# Patient Record
Sex: FEMALE | Race: WHITE | NOT HISPANIC OR LATINO | Employment: FULL TIME | URBAN - METROPOLITAN AREA
[De-identification: names, ages, dates, MRNs, and addresses within clinical notes are randomized per-mention and may not be internally consistent; named-entity substitution may affect disease eponyms.]

---
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
## 2022-01-04 ENCOUNTER — TELEPHONE (OUTPATIENT)
Dept: SPORTS MEDICINE | Facility: CLINIC | Age: 22
End: 2022-01-04
Payer: MEDICAID

## 2022-01-05 ENCOUNTER — OFFICE VISIT (OUTPATIENT)
Dept: SPORTS MEDICINE | Facility: CLINIC | Age: 22
End: 2022-01-05
Payer: MEDICAID

## 2022-01-05 ENCOUNTER — HOSPITAL ENCOUNTER (OUTPATIENT)
Dept: RADIOLOGY | Facility: HOSPITAL | Age: 22
Discharge: HOME | End: 2022-01-05
Attending: ORTHOPAEDIC SURGERY
Payer: MEDICAID

## 2022-01-05 VITALS
HEIGHT: 65 IN | SYSTOLIC BLOOD PRESSURE: 98 MMHG | WEIGHT: 122 LBS | BODY MASS INDEX: 20.33 KG/M2 | DIASTOLIC BLOOD PRESSURE: 64 MMHG | HEART RATE: 88 BPM

## 2022-01-05 DIAGNOSIS — M25.512 CHRONIC PAIN OF BOTH SHOULDERS: Primary | ICD-10-CM

## 2022-01-05 DIAGNOSIS — M54.10 BACK PAIN WITH RADICULOPATHY: ICD-10-CM

## 2022-01-05 DIAGNOSIS — M25.511 CHRONIC PAIN OF BOTH SHOULDERS: Primary | ICD-10-CM

## 2022-01-05 DIAGNOSIS — S46.811A STRAIN OF RIGHT TRAPEZIUS MUSCLE, INITIAL ENCOUNTER: ICD-10-CM

## 2022-01-05 DIAGNOSIS — M25.519 SHOULDER PAIN, UNSPECIFIED CHRONICITY, UNSPECIFIED LATERALITY: ICD-10-CM

## 2022-01-05 DIAGNOSIS — G89.29 CHRONIC PAIN OF BOTH SHOULDERS: Primary | ICD-10-CM

## 2022-01-05 PROCEDURE — 3074F SYST BP LT 130 MM HG: CPT | Mod: CPTII,,, | Performed by: ORTHOPAEDIC SURGERY

## 2022-01-05 PROCEDURE — 3008F PR BODY MASS INDEX (BMI) DOCUMENTED: ICD-10-PCS | Mod: CPTII,,, | Performed by: ORTHOPAEDIC SURGERY

## 2022-01-05 PROCEDURE — 3008F BODY MASS INDEX DOCD: CPT | Mod: CPTII,,, | Performed by: ORTHOPAEDIC SURGERY

## 2022-01-05 PROCEDURE — 73030 X-RAY EXAM OF SHOULDER: CPT | Mod: TC,50,FY,PO

## 2022-01-05 PROCEDURE — 99204 PR OFFICE/OUTPT VISIT, NEW, LEVL IV, 45-59 MIN: ICD-10-PCS | Mod: S$PBB,,, | Performed by: ORTHOPAEDIC SURGERY

## 2022-01-05 PROCEDURE — 99999 PR PBB SHADOW E&M-EST. PATIENT-LVL III: CPT | Mod: PBBFAC,,, | Performed by: ORTHOPAEDIC SURGERY

## 2022-01-05 PROCEDURE — 99204 OFFICE O/P NEW MOD 45 MIN: CPT | Mod: S$PBB,,, | Performed by: ORTHOPAEDIC SURGERY

## 2022-01-05 PROCEDURE — 99999 PR PBB SHADOW E&M-EST. PATIENT-LVL III: ICD-10-PCS | Mod: PBBFAC,,, | Performed by: ORTHOPAEDIC SURGERY

## 2022-01-05 PROCEDURE — 3078F PR MOST RECENT DIASTOLIC BLOOD PRESSURE < 80 MM HG: ICD-10-PCS | Mod: CPTII,,, | Performed by: ORTHOPAEDIC SURGERY

## 2022-01-05 PROCEDURE — 3078F DIAST BP <80 MM HG: CPT | Mod: CPTII,,, | Performed by: ORTHOPAEDIC SURGERY

## 2022-01-05 PROCEDURE — 3074F PR MOST RECENT SYSTOLIC BLOOD PRESSURE < 130 MM HG: ICD-10-PCS | Mod: CPTII,,, | Performed by: ORTHOPAEDIC SURGERY

## 2022-01-05 PROCEDURE — 99213 OFFICE O/P EST LOW 20 MIN: CPT | Mod: PBBFAC,PN | Performed by: ORTHOPAEDIC SURGERY

## 2022-01-05 NOTE — PROGRESS NOTES
Subjective:          Chief Complaint: Marilee Dumas is a 21 y.o. female who had concerns including Pain of the Right Shoulder.    HPI     Patient who is [facility] and from [place] with a history of scoliosis & fibromyalgia and works as a  presents to clinic with bilateral shoulder pain x several years. Patient states the pain began gradually and generalize along her upper back and the lateral border of her scapula. Pain is also present within trapezius musculature. She denies any VANGIE or traumatic event.  She rates the pain as 4/10 at rest and 10/10 at its worst. Things that exacerbate the pain include overhead movement of her right arm, but pain can also become severe during prolonged sitting or standing.  She states she saw a back and spine specialist several years ago in [place], but does not recall what was done.  She has attempted physical therapy for her back for the past year, which has not given her much relief.  She has also received both cervical and lumbar MRI, see detailed findings below.  She has attempted multiple conservative measures that include activity modification, ice & elevation, and oral medications (meloxicam and cyclobenzaprine), which have given some relief. She denies any mechanical symptoms to include locking and catching or instability.  Is affecting ADLs and limiting desired level of activity.  In addition to the pain, she will also have numbness and tingling that radiates down both radial and ulnar aspects of the arm.  She is here today to discuss treatment options.    No previous surgeries or trauma on bilateral shoulder    SSV:  70%      Review of Systems   Constitutional: Negative.   HENT: Negative.    Eyes: Negative.    Cardiovascular: Negative.    Respiratory: Negative.    Endocrine: Negative.    Hematologic/Lymphatic: Negative.    Skin: Negative.    Musculoskeletal: Positive for back pain, joint pain, muscle weakness, myalgias and neck pain. Negative for arthritis,  falls, gout and joint swelling.   Neurological: Positive for numbness and paresthesias.   Psychiatric/Behavioral: Negative.    Allergic/Immunologic: Negative.                    Objective:        General: Marilee is well-developed, well-nourished, appears stated age, in no acute distress, alert and oriented to time, place and person.     General    Nursing note and vitals reviewed.  Constitutional: She is oriented to person, place, and time. She appears well-developed and well-nourished. No distress.   HENT:   Head: Normocephalic and atraumatic.   Nose: Nose normal.   Eyes: EOM are normal.   Cardiovascular: Normal rate and intact distal pulses.    Pulmonary/Chest: Effort normal. No respiratory distress.   Neurological: She is alert and oriented to person, place, and time.   Psychiatric: She has a normal mood and affect. Her behavior is normal. Judgment and thought content normal.         Back (L-Spine & T-Spine) / Neck (C-Spine) Exam     Tenderness   The patient is tender to palpation of the right scapular.   Right Shoulder Exam     Inspection/Observation   Swelling: absent  Bruising: absent  Scars: absent  Deformity: absent  Scapular Winging: absent  Scapular Dyskinesia: negative  Atrophy: absent    Range of Motion   Active abduction: 170   Passive abduction: 170   Extension: 60   Forward Flexion: 160 Adduction: 90   External Rotation 0 degrees: 90 External Rotation 90 degrees: 90   Internal rotation 0 degrees: Upperthoracic   Internal rotation 90 degrees: 90     Tests & Signs   Cross arm: negative  Drop arm: negative  Desai test: negative  Impingement: negative  Rotator Cuff Painful Arc/Range: mild  Lag Sign 0 degrees: negative  Lag Sign 90 degrees: negative  Lift Off Sign: negative  Belly Press: negative  Active Compression Test (Cooper's Sign): negative  Speed's Test: negative  Bear Hug: negative    Other   Sensation: normal    Left Shoulder Exam   Left shoulder exam is normal.    Inspection/Observation    Swelling: absent  Bruising: absent  Scars: absent  Deformity: absent  Scapular Winging: absent  Scapular Dyskinesia: negative  Atrophy: absent    Tenderness   The patient is experiencing no tenderness.     Range of Motion   Active abduction:  170 normal   Passive abduction:  170 normal   Extension:  60 normal   Forward Flexion:  160 normal   Adduction: 90 normal  External Rotation 0 degrees: 90 External Rotation 90 degrees: 90   Internal rotation 0 degrees: Mid thoracic   Internal rotation 90 degrees: 80     Muscle Strength   The patient has normal left shoulder strength.    Tests & Signs   Cross arm: negative  Drop arm: negative  Desai test: negative  Impingement: negative  Lag Sign 0 degrees: negative  Lag Sign 90 degrees: negative  Lift Off Sign: negative  Belly Press: negative  Active Compression test (Harding's Sign): negative  Speed's Test: negative  Bear Hug: negative    Other   Sensation: normal       Muscle Strength   Right Upper Extremity   Shoulder Abduction: 5/5   Shoulder Internal Rotation: 5/5   Shoulder External Rotation: 5/5   Supraspinatus: 5/5   Subscapularis: 5/5   Biceps: 5/5   Left Upper Extremity  Shoulder Abduction: 5/5   Shoulder Internal Rotation: 5/5   Shoulder External Rotation: 5/5   Supraspinatus: 5/5   Subscapularis: 5/5   Biceps: 5/5     Vascular Exam     Right Pulses      Radial:                    2+      Left Pulses      Radial:                    2+      Capillary Refill  Right Hand: normal capillary refill  Left Hand: normal capillary refill    MRI cervical spine    Difficult reading due to patient motion    No signs of spinal canal or neural foramen stenosis noted          Assessment:       Encounter Diagnoses   Name Primary?    Chronic pain of both shoulders Yes    Back pain with radiculopathy     Strain of right trapezius muscle, initial encounter           Plan:       1. I made the decision to order new imaging of the extremity or extremities evaluated. I independently  reviewed and interpreted the radiographs and/or MRIs today. These images were shown to the patient where I then discussed my findings in detail.    2. We discussed at length different treatment options including conservative vs surgical management. These include anti-inflammatories, acetaminophen, rest, ice, heat, formal physical therapy including strengthening and stretching exercises, home exercise programs, dry needling, and finally surgical intervention.  We discussed that the source of her pain may be due to some nerve impingement at some point between the cervical spine and her right shoulder, due to the fact her shoulder strength seems appropriate and shoulder exam for the most part is unremarkable.  At this point, we will begin with  formal physical therapy for 6 weeks and then see patient again for follow-up.  Patient expressed understanding and is in agreement with this plan.    3. Ambulatory referral for formal physical therapy at [facility] with focus on Rotator cuff and Periscapular strengthening    4. Continue meloxicam and cyclobenzaprine as needed for pain.    5. RTC to see Tc Whiting PA-C in 6 weeks for follow-up.  Will reassess pain level and determine if possible referral to see my colleague Louis Billingsley D.O. for OMT.  If pain has not subsided with this management will consider referral to back and spine.      All of the patient's questions were answered. Patient was advised to call the clinic or contact me through the patient portal for any questions or concerns.                     Patient questionnaires may have been collected.

## 2022-01-06 ENCOUNTER — TELEPHONE (OUTPATIENT)
Dept: SPORTS MEDICINE | Facility: CLINIC | Age: 22
End: 2022-01-06
Payer: MEDICAID

## 2022-01-06 NOTE — TELEPHONE ENCOUNTER
----- Message from Sharri Lees sent at 1/6/2022  2:09 PM CST -----  Regarding: Patient Advice  Contact: Pt [phone]  Patient is calling Dr. Whiting's office in regards to getting referral get Physical Therapy to [facility] in [facility]. Please call. [phone]

## 2022-01-07 ENCOUNTER — TELEPHONE (OUTPATIENT)
Dept: SPORTS MEDICINE | Facility: CLINIC | Age: 22
End: 2022-01-07
Payer: MEDICAID

## 2022-01-07 NOTE — TELEPHONE ENCOUNTER
----- Message from Gregory Morales MA sent at 1/7/2022  8:44 AM CST -----    ----- Message -----  From: Melissa Tsai  Sent: 1/7/2022   8:18 AM CST  To: Johanne ROMERO Staff    .Type:  Patient Returning Call    Who Called: Kaylie   Would the patient rather a call back or a response via [facility]? Call back   Best Call Back Number: [phone]   Additional Information:  Good morning I have [facility] calling in ref to fax received for mrn [phone] Marilee Dumas... they are looking for pt demographic sheet and insurance verification sheet  Fax to [phone]

## 2022-02-10 ENCOUNTER — PATIENT MESSAGE (OUTPATIENT)
Dept: SPORTS MEDICINE | Facility: CLINIC | Age: 22
End: 2022-02-10
Payer: MEDICAID

## 2022-02-23 ENCOUNTER — OFFICE VISIT (OUTPATIENT)
Dept: SPORTS MEDICINE | Facility: CLINIC | Age: 22
End: 2022-02-23
Payer: MEDICAID

## 2022-02-23 DIAGNOSIS — M54.10 BACK PAIN WITH RADICULOPATHY: Primary | ICD-10-CM

## 2022-02-23 PROCEDURE — 99213 OFFICE O/P EST LOW 20 MIN: CPT | Mod: 95,S$PBB,, | Performed by: ORTHOPAEDIC SURGERY

## 2022-02-23 PROCEDURE — 1159F MED LIST DOCD IN RCRD: CPT | Mod: CPTII,95,, | Performed by: ORTHOPAEDIC SURGERY

## 2022-02-23 PROCEDURE — 99213 PR OFFICE/OUTPT VISIT, EST, LEVL III, 20-29 MIN: ICD-10-PCS | Mod: 95,S$PBB,, | Performed by: ORTHOPAEDIC SURGERY

## 2022-02-23 PROCEDURE — 1160F PR REVIEW ALL MEDS BY PRESCRIBER/CLIN PHARMACIST DOCUMENTED: ICD-10-PCS | Mod: CPTII,95,, | Performed by: ORTHOPAEDIC SURGERY

## 2022-02-23 PROCEDURE — 1159F PR MEDICATION LIST DOCUMENTED IN MEDICAL RECORD: ICD-10-PCS | Mod: CPTII,95,, | Performed by: ORTHOPAEDIC SURGERY

## 2022-02-23 PROCEDURE — 1160F RVW MEDS BY RX/DR IN RCRD: CPT | Mod: CPTII,95,, | Performed by: ORTHOPAEDIC SURGERY

## 2022-02-23 NOTE — PROGRESS NOTES
The patient location is: Home   The chief complaint leading to consultation is: f/u Right shoulder pain    Visit type: audio only    Notes:    Subjective:    HPI:    Spoke with the patient today for a 12 week f/u visit for Right shoulder and back pain. Patient reports her pain is unchanged since our initial visit.  She attended physical therapy at [facility] in [place] and states she attended twice a week for the past 2 months.  She was previously attending this facility for PT for her back. She feels most of the pain is still between her spine and her shoulder blade.  She still gets occasional numbness and tingling radiating down her arm but is very infrequent.    She has not seen a back and spine specialist for this issue since she was 17. She does not recall exactly what was done then other than the fact she was diagnosed with scoliosis & fibromyalgia and saw a chiropractor. She has been taking Mobic and cyclobenzaprine that has not given her relief.    Pain has been X dream we did a bili taping and affecting her ADLs, as she works as a  which requires a great deal standing and walking.  Pain is at its worst following evenings after she works and stands for long period of time. She feels the pain does not change based on the use of the shoulder.    Interval history 1/5/22:  Patient who is [facility] and from [place] with a history of scoliosis & fibromyalgia and works as a  presents to clinic with bilateral shoulder pain x several years. Patient states the pain began gradually and generalize along her upper back and the lateral border of her scapula. Pain is also present within trapezius musculature. She denies any VANGIE or traumatic event.  She rates the pain as 4/10 at rest and 10/10 at its worst. Things that exacerbate the pain include overhead movement of her right arm, but pain can also become severe during prolonged sitting or standing.  She states she saw a back and spine specialist several  years ago in [place], but does not recall what was done.  She has attempted physical therapy for her back for the past year, which has not given her much relief.  She has also received both cervical and lumbar MRI, see detailed findings below.  She has attempted multiple conservative measures that include activity modification, ice & elevation, and oral medications (meloxicam and cyclobenzaprine), which have given some relief. She denies any mechanical symptoms to include locking and catching or instability.  Is affecting ADLs and limiting desired level of activity.  In addition to the pain, she will also have numbness and tingling that radiates down both radial and ulnar aspects of the arm.  She is here today to discuss treatment options.     No previous surgeries or trauma on bilateral shoulder     SSV:  70%      Review of Systems:   Negative unless otherwise noted positive-  Gen- Weakness/ Fatigue  Neuro- Confusion  CV- Chest Pain/ Palpitations  Resp: Cough/ SOB  GI- Nausea/Vomiting  Extrem- Pain/Swelling      Objective:  No flowsheet data found.      Physical Exam:  General- Patient alert and oriented x3 in NAD  HEENT- PERRLA, EOMI, OP clear  Resp- No increased WOB noted. Not using accessory muscles.  GI- Non tender/non-distended  Extrem- No cyanosis, clubbing, edema.   Skin-  No Jaundice. No visible skin lesions.      Plan:    Due to patient's pain level not responding to 12 weeks of physical therapy that focused on the shoulder, as well as she feels most of the pain is related to her back and not affected by her shoulder.  I feel that a referral to back and spine is warranted at this time.    Referral placed to back and spine in the [place] region.     RTC to see Tc beltran.  Patient was advised to contact our clinic if no one reaches out to her to schedule appointment with back and spine by next week.      All of the patient's questions were answered. Patient was advised to call the  clinic or contact me through the patient portal for any questions or concerns.         Face to Face time with patient:  0  Fifteen minutes of total time spent on the encounter, which includes face to face time and non-face to face time preparing to see the patient (eg, review of tests), Obtaining and/or reviewing separately obtained history, Documenting clinical information in the electronic or other health record, Independently interpreting results (not separately reported) and communicating results to the patient/family/caregiver, or Care coordination (not separately reported).     Each patient to whom he or she provides medical services by telemedicine is:  (1) informed of the relationship between the physician and patient and the respective role of any other health care provider with respect to management of the patient; and (2) notified that he or she may decline to receive medical services by telemedicine and may withdraw from such care at any time.

## 2022-02-23 NOTE — PROGRESS NOTES
Subjective:          Chief Complaint: Marilee Dumas is a 21 y.o. female who had no chief complaint listed for this encounter.    HPI     Patient who is [facility] and from [place] with a history of scoliosis & fibromyalgia and works as a  presents to clinic with bilateral shoulder pain x several years. Patient states the pain began gradually and generalize along her upper back and the lateral border of her scapula. Pain is also present within trapezius musculature. She denies any VANGIE or traumatic event.  She rates the pain as 4/10 at rest and 10/10 at its worst. Things that exacerbate the pain include overhead movement of her right arm, but pain can also become severe during prolonged sitting or standing.  She states she saw a back and spine specialist several years ago in [place], but does not recall what was done.  She has attempted physical therapy for her back for the past year, which has not given her much relief.  She has also received both cervical and lumbar MRI, see detailed findings below.  She has attempted multiple conservative measures that include activity modification, ice & elevation, and oral medications (meloxicam and cyclobenzaprine), which have given some relief. She denies any mechanical symptoms to include locking and catching or instability.  Is affecting ADLs and limiting desired level of activity.  In addition to the pain, she will also have numbness and tingling that radiates down both radial and ulnar aspects of the arm.  She is here today to discuss treatment options.    No previous surgeries or trauma on bilateral shoulder    SSV:  70%      Review of Systems   Constitutional: Negative.   HENT: Negative.    Eyes: Negative.    Cardiovascular: Negative.    Respiratory: Negative.    Endocrine: Negative.    Hematologic/Lymphatic: Negative.    Skin: Negative.    Musculoskeletal: Positive for back pain, joint pain, muscle weakness, myalgias and neck pain. Negative for arthritis,  falls, gout and joint swelling.   Neurological: Positive for numbness and paresthesias.   Psychiatric/Behavioral: Negative.    Allergic/Immunologic: Negative.                    Objective:        General: Marilee is well-developed, well-nourished, appears stated age, in no acute distress, alert and oriented to time, place and person.     General    Nursing note and vitals reviewed.  Constitutional: She is oriented to person, place, and time. She appears well-developed and well-nourished. No distress.   HENT:   Head: Normocephalic and atraumatic.   Nose: Nose normal.   Eyes: EOM are normal.   Cardiovascular: Normal rate and intact distal pulses.    Pulmonary/Chest: Effort normal. No respiratory distress.   Neurological: She is alert and oriented to person, place, and time.   Psychiatric: She has a normal mood and affect. Her behavior is normal. Judgment and thought content normal.         Back (L-Spine & T-Spine) / Neck (C-Spine) Exam     Tenderness   The patient is tender to palpation of the right scapular.   Right Shoulder Exam     Inspection/Observation   Swelling: absent  Bruising: absent  Scars: absent  Deformity: absent  Scapular Winging: absent  Scapular Dyskinesia: negative  Atrophy: absent    Range of Motion   Active abduction: 170   Passive abduction: 170   Extension: 60   Forward Flexion: 160 Adduction: 90   External Rotation 0 degrees: 90 External Rotation 90 degrees: 90   Internal rotation 0 degrees: Upperthoracic   Internal rotation 90 degrees: 90     Tests & Signs   Cross arm: negative  Drop arm: negative  Desai test: negative  Impingement: negative  Rotator Cuff Painful Arc/Range: mild  Lag Sign 0 degrees: negative  Lag Sign 90 degrees: negative  Lift Off Sign: negative  Belly Press: negative  Active Compression Test ([place]'s Sign): negative  Speed's Test: negative  Bear Hug: negative    Other   Sensation: normal    Left Shoulder Exam   Left shoulder exam is normal.    Inspection/Observation    Swelling: absent  Bruising: absent  Scars: absent  Deformity: absent  Scapular Winging: absent  Scapular Dyskinesia: negative  Atrophy: absent    Tenderness   The patient is experiencing no tenderness.     Range of Motion   Active abduction:  170 normal   Passive abduction:  170 normal   Extension:  60 normal   Forward Flexion:  160 normal   Adduction: 90 normal  External Rotation 0 degrees: 90 External Rotation 90 degrees: 90   Internal rotation 0 degrees: Mid thoracic   Internal rotation 90 degrees: 80     Muscle Strength   The patient has normal left shoulder strength.    Tests & Signs   Cross arm: negative  Drop arm: negative  Desai test: negative  Impingement: negative  Lag Sign 0 degrees: negative  Lag Sign 90 degrees: negative  Lift Off Sign: negative  Belly Press: negative  Active Compression test ([place]'s Sign): negative  Speed's Test: negative  Bear Hug: negative    Other   Sensation: normal       Muscle Strength   Right Upper Extremity   Shoulder Abduction: 5/5   Shoulder Internal Rotation: 5/5   Shoulder External Rotation: 5/5   Supraspinatus: 5/5   Subscapularis: 5/5   Biceps: 5/5   Left Upper Extremity  Shoulder Abduction: 5/5   Shoulder Internal Rotation: 5/5   Shoulder External Rotation: 5/5   Supraspinatus: 5/5   Subscapularis: 5/5   Biceps: 5/5     Vascular Exam     Right Pulses      Radial:                    2+      Left Pulses      Radial:                    2+      Capillary Refill  Right Hand: normal capillary refill  Left Hand: normal capillary refill    MRI cervical spine    Difficult reading due to patient motion    No signs of spinal canal or neural foramen stenosis noted          Assessment:       No diagnosis found.       Plan:       1. I made the decision to order new imaging of the extremity or extremities evaluated. I independently reviewed and interpreted the radiographs and/or MRIs today. These images were shown to the patient where I then discussed my findings in  detail.    2. We discussed at length different treatment options including conservative vs surgical management. These include anti-inflammatories, acetaminophen, rest, ice, heat, formal physical therapy including strengthening and stretching exercises, home exercise programs, dry needling, and finally surgical intervention.  We discussed that the source of her pain may be due to some nerve impingement at some point between the cervical spine and her right shoulder, due to the fact her shoulder strength seems appropriate and shoulder exam for the most part is unremarkable.  At this point, we will begin with  formal physical therapy for 6 weeks and then see patient again for follow-up.  Patient expressed understanding and is in agreement with this plan.    3. Ambulatory referral for formal physical therapy at [facility] with focus on Rotator cuff and Periscapular strengthening    4. Continue meloxicam and cyclobenzaprine as needed for pain.    5. RTC to see Tc Whiting PA-C in 6 weeks for follow-up.  Will reassess pain level and determine if possible referral to see my colleague Louis Billingsley D.O. for OMT.  If pain has not subsided with this management will consider referral to back and spine.      All of the patient's questions were answered. Patient was advised to call the clinic or contact me through the patient portal for any questions or concerns.                     Patient questionnaires may have been collected.

## 2022-02-28 ENCOUNTER — TELEPHONE (OUTPATIENT)
Dept: SPINE | Facility: CLINIC | Age: 22
End: 2022-02-28
Payer: MEDICAID

## 2022-02-28 NOTE — TELEPHONE ENCOUNTER
From B&S workqueue.  Spoke with patient and she would like to make an appt in [place].  Will forward to [facility] nurse navigator

## 2022-03-01 ENCOUNTER — PATIENT MESSAGE (OUTPATIENT)
Dept: PHYSICAL MEDICINE AND REHAB | Facility: CLINIC | Age: 22
End: 2022-03-01
Payer: MEDICAID

## 2022-03-01 ENCOUNTER — TELEPHONE (OUTPATIENT)
Dept: PAIN MEDICINE | Facility: CLINIC | Age: 22
End: 2022-03-01
Payer: MEDICAID

## 2022-03-01 NOTE — TELEPHONE ENCOUNTER
1. LVM for patient to call back [phone] regarding appointment for Back and Spine referral.    2. Left message in patient portal for call back.    Kaila Tomas RN

## 2022-03-02 ENCOUNTER — TELEPHONE (OUTPATIENT)
Dept: PAIN MEDICINE | Facility: CLINIC | Age: 22
End: 2022-03-02
Payer: MEDICAID

## 2022-03-02 NOTE — TELEPHONE ENCOUNTER
"Returned patient's phone call and calling again from . Message states "not accepting calls at this time" and no VM is set up. Tried alternate number and it is not a working number.    Kaila Tomas RN  "

## 2022-03-11 ENCOUNTER — TELEPHONE (OUTPATIENT)
Dept: PHYSICAL MEDICINE AND REHAB | Facility: CLINIC | Age: 22
End: 2022-03-11
Payer: MEDICAID

## 2022-03-11 NOTE — TELEPHONE ENCOUNTER
----- Message from Kirit Shelton sent at 3/11/2022 12:04 PM CST -----  Pt has referral would like to schedule appt with back/spine clinic.  Please call back at .[phone].  Patricio Dyer

## 2022-03-11 NOTE — TELEPHONE ENCOUNTER
Spoke with Ms. Dumas and scheduled an appt with Back and Spine for 4.22.22 at 1pm with Dr. Solis.

## 2022-04-05 ENCOUNTER — HOSPITAL ENCOUNTER (EMERGENCY)
Facility: HOSPITAL | Age: 22
Discharge: HOME | End: 2022-04-06
Attending: EMERGENCY MEDICINE
Payer: MEDICAID

## 2022-04-05 DIAGNOSIS — K59.00 CONSTIPATION: ICD-10-CM

## 2022-04-05 DIAGNOSIS — K56.41 FECAL IMPACTION IN RECTUM: Primary | ICD-10-CM

## 2022-04-05 PROCEDURE — 99283 EMERGENCY DEPT VISIT LOW MDM: CPT | Mod: ER

## 2022-04-05 PROCEDURE — 25000003 PHARM REV CODE 250: Mod: ER | Performed by: EMERGENCY MEDICINE

## 2022-04-05 RX ORDER — LACTULOSE 10 G/15ML
20 SOLUTION ORAL
Status: COMPLETED | OUTPATIENT
Start: 2022-04-05 | End: 2022-04-05

## 2022-04-05 RX ORDER — GLYCERIN 1 G/1
1 SUPPOSITORY RECTAL
Status: COMPLETED | OUTPATIENT
Start: 2022-04-05 | End: 2022-04-05

## 2022-04-05 RX ADMIN — LACTULOSE 20 G: 20 SOLUTION ORAL at 11:04

## 2022-04-05 RX ADMIN — GLYCERIN 1 SUPPOSITORY: 2 SUPPOSITORY RECTAL at 10:04

## 2022-04-06 VITALS
SYSTOLIC BLOOD PRESSURE: 123 MMHG | BODY MASS INDEX: 17.24 KG/M2 | TEMPERATURE: 99 F | HEART RATE: 85 BPM | DIASTOLIC BLOOD PRESSURE: 78 MMHG | RESPIRATION RATE: 18 BRPM | OXYGEN SATURATION: 100 % | WEIGHT: 103.63 LBS

## 2022-04-06 RX ORDER — LACTULOSE 10 G/15ML
10 SOLUTION ORAL; RECTAL 3 TIMES DAILY PRN
Qty: 135 ML | Refills: 0 | Status: SHIPPED | OUTPATIENT
Start: 2022-04-06 | End: 2022-06-01

## 2022-04-06 NOTE — ED PROVIDER NOTES
Encounter Date: 4/5/2022       History     Chief Complaint   Patient presents with    Constipation     Has not had BM in 4 days. Currently taking oxycodone. Took miralax, mag sulfate, stool softener and still cannot go.      20 y/o F with PMH low back pain here with c/o constipation. Has not had a BM in 4 days. This has not improved with miralax, mag sulfate at home over the past day. This worsens when she takes oxycodone. She denies any rectal pain, abnormal vaginal discharge, vaginal bleeding, dysuria, N/V.         Review of patient's allergies indicates:   Allergen Reactions    Penicillins      Past Medical History:   Diagnosis Date    Neck pain      No past surgical history on file.  No family history on file.  Social History     Tobacco Use    Smoking status: Current Every Day Smoker     Types: Cigarettes    Smokeless tobacco: Former User   Substance Use Topics    Alcohol use: Never    Drug use: Yes     Types: Marijuana     Review of Systems   Constitutional: Negative for diaphoresis and fever.   HENT: Negative for congestion, dental problem and sore throat.    Eyes: Negative for pain and visual disturbance.   Respiratory: Negative for cough and shortness of breath.    Cardiovascular: Negative for chest pain and palpitations.   Gastrointestinal: Positive for constipation. Negative for abdominal pain, diarrhea, nausea and vomiting.   Genitourinary: Negative for dysuria and flank pain.   Musculoskeletal: Negative for back pain and neck pain.   Skin: Negative for rash and wound.   Neurological: Negative for weakness, numbness and headaches.   Psychiatric/Behavioral: Negative for agitation and confusion.       Physical Exam     Initial Vitals [04/05/22 2214]   BP Pulse Resp Temp SpO2   122/79 (!) 126 18 99.2 °F (37.3 °C) 100 %      MAP       --         Physical Exam    Constitutional: She appears well-developed and well-nourished.   HENT:   Head: Normocephalic and atraumatic.   Eyes: EOM are normal. Pupils  are equal, round, and reactive to light.   Neck: Neck supple.   Normal range of motion.  Cardiovascular: Normal rate and regular rhythm.   Pulmonary/Chest: Breath sounds normal. No respiratory distress.   Abdominal: She exhibits no distension. There is no abdominal tenderness.   Genitourinary:    Genitourinary Comments: Fecal impaction. No hemorrhoids or masses. No rectal bleeding, melena, or hematochezia     Musculoskeletal:         General: No tenderness or edema. Normal range of motion.      Cervical back: Normal range of motion and neck supple.     Neurological: She is alert and oriented to person, place, and time.   Skin: Skin is warm and dry.   Psychiatric: She has a normal mood and affect.         ED Course   Procedures  Labs Reviewed - No data to display       Imaging Results          X-Ray Abdomen Flat And Erect (Final result)  Result time 04/05/22 22:46:55    Final result by Sam Polo MD (04/05/22 22:46:55)                 Impression:      Constipation versus impaction.      Electronically signed by: Sam Polo  Date:    04/05/2022  Time:    22:46             Narrative:    EXAMINATION:  XR ABDOMEN FLAT AND ERECT    CLINICAL HISTORY:  Constipation, unspecified    TECHNIQUE:  Flat and erect AP views of the abdomen were performed.    COMPARISON:  None    FINDINGS:  Moderate stool burden over the colon and rectum.  Correlation for constipation or impaction    No findings to suggest bowel obstruction.  No gross intraperitoneal free air.  Lung bases are clear.  Osseous structures intact.                                 Medications   glycerin adult suppository 1 suppository (1 suppository Rectal Given 4/5/22 2240)   lactulose 20 gram/30 mL solution Soln 20 g (20 g Oral Given 4/5/22 2347)                 ED Course as of 04/06/22 0026   Tue Apr 05, 2022   2358 Patient disimpacted [BA]   Wed Apr 06, 2022   0021 Patient haD BM in the ED. Would like to go home and continue BM's.  [BA]   0022 12:22 AM  Reassessment: I reassessed the pt.  The pt is resting comfortably and is NAD.  Pt states their sx have improved. Discussed test results, shared treatment plan, specific conditions for return, and the need for f/u.  Answered their questions at this time.  Pt understands and agrees to the plan.  The pt has remained hemodynamically stable through ED course and is stable for discharge.    [BA]      ED Course User Index  [BA] Roland Pappas MD             Clinical Impression:   Final diagnoses:  [K59.00] Constipation  [K56.41] Fecal impaction in rectum (Primary)          ED Disposition Condition    Discharge Stable        ED Prescriptions     Medication Sig Dispense Start Date End Date Auth. Provider    lactulose (CHRONULAC) 10 gram/15 mL solution Take 15 mLs (10 g total) by mouth 3 (three) times daily as needed (constipation). 135 mL 4/6/2022  Roland Pappas MD        Follow-up Information     Follow up With Specialties Details Why Contact Info    Manuel Kimble MD Family Medicine Schedule an appointment as soon as possible for a visit in 2 days For re-evaluation and further treatment [id number] [facility]  [place] [address]  [phone]      [facility] Emergency Dept Emergency Medicine Go today If symptoms worsen, For re-evaluation and further treatment, As needed [id number]  [place] [id number] [phone]           Roland Pappas MD  04/06/22 0026

## 2022-04-22 ENCOUNTER — OFFICE VISIT (OUTPATIENT)
Dept: PAIN MEDICINE | Facility: CLINIC | Age: 22
End: 2022-04-22
Payer: MEDICAID

## 2022-04-22 DIAGNOSIS — M54.10 BACK PAIN WITH RADICULOPATHY: ICD-10-CM

## 2022-04-22 DIAGNOSIS — M47.894 THORACIC FACET JOINT SYNDROME: ICD-10-CM

## 2022-04-22 DIAGNOSIS — M47.814 THORACIC SPONDYLOSIS: Primary | ICD-10-CM

## 2022-04-22 PROCEDURE — 99204 PR OFFICE/OUTPT VISIT, NEW, LEVL IV, 45-59 MIN: ICD-10-PCS | Mod: 95,,, | Performed by: ANESTHESIOLOGY

## 2022-04-22 PROCEDURE — 1160F PR REVIEW ALL MEDS BY PRESCRIBER/CLIN PHARMACIST DOCUMENTED: ICD-10-PCS | Mod: CPTII,95,, | Performed by: ANESTHESIOLOGY

## 2022-04-22 PROCEDURE — 1160F RVW MEDS BY RX/DR IN RCRD: CPT | Mod: CPTII,95,, | Performed by: ANESTHESIOLOGY

## 2022-04-22 PROCEDURE — 1159F PR MEDICATION LIST DOCUMENTED IN MEDICAL RECORD: ICD-10-PCS | Mod: CPTII,95,, | Performed by: ANESTHESIOLOGY

## 2022-04-22 PROCEDURE — 99204 OFFICE O/P NEW MOD 45 MIN: CPT | Mod: 95,,, | Performed by: ANESTHESIOLOGY

## 2022-04-22 PROCEDURE — 1159F MED LIST DOCD IN RCRD: CPT | Mod: CPTII,95,, | Performed by: ANESTHESIOLOGY

## 2022-04-22 NOTE — PROGRESS NOTES
The patient location is: Home  The chief complaint leading to consultation is: Thoracic Back Pain     Visit type: audiovisual    Face to Face time with patient: 15  20 minutes of total time spent on the encounter, which includes face to face time and non-face to face time preparing to see the patient (eg, review of tests), Obtaining and/or reviewing separately obtained history, Documenting clinical information in the electronic or other health record, Independently interpreting results (not separately reported) and communicating results to the patient/family/caregiver, or Care coordination (not separately reported).         Each patient to whom he or she provides medical services by telemedicine is:  (1) informed of the relationship between the physician and patient and the respective role of any other health care provider with respect to management of the patient; and (2) notified that he or she may decline to receive medical services by telemedicine and may withdraw from such care at any time.    Notes:     Chief Pain Complaint:  Thoracic Back Pain       History of Present Illness:   Marilee Dumas is a 21 y.o. female  who is presenting with a chief complaint of Thoracic Back Pain. The patient began experiencing this problem insidiously, and the pain has been gradually worsening over the past 5 year(s). The pain is described as throbbing, cramping, aching and heavy and is located in the bilateral mid back T4-5. Pain is intermittent and lasts hours. The  pain is nonradiating. The patient rates her pain a 7 out of ten and interferes with activities of daily living a 7 out of ten. Pain is exacerbated by rotation of the thoracic spine, and is improved by rest. Patient reports prior trauma patient fell from a swing 5 years ago, no prior spinal surgery     - pertinent negatives: No fever, No chills, No weight loss, No bladder dysfunction, No bowel dysfunction, No saddle anesthesia  - pertinent positives: none    -  medications, other therapies tried (physical therapy, injections):     >> NSAIDs, Tylenol, Tramadol, gabapentin, flexeril and medrol dose pack    >> Has previously undergone Physical Therapy    >> Has NOT previously undergone spinal injection/s      Imaging / Labs / Studies (reviewed on 4/22/2022):    Results for orders placed during the hospital encounter of 09/10/21    MRI Lumbar Spine Without Contrast    Narrative  EXAMINATION:  MRI LUMBAR SPINE WITHOUT CONTRAST    CLINICAL HISTORY:  Back pain or radiculopathy, > 6 wks; Dorsalgia, unspecified    TECHNIQUE:  Multiplanar, multisequence MR images were acquired from the thoracolumbar junction to the sacrum without the administration of contrast.    COMPARISON:  None.    FINDINGS:  Alignment: There is slight rightward rotation of multiple upper lumbar vertebral bodies, likely related to mild thoracic scoliosis.    Vertebrae: Normal marrow signal. No fracture.    Discs: Normal height and signal.    Cord: Normal.  Conus terminates at L1    Degenerative findings:    No significant disc bulge, spinal canal stenosis, or neural foraminal narrowing.    Paraspinal muscles & soft tissues: Unremarkable.    Impression  As above.  No acute findings.      Electronically signed by: Jerzy Stevenson MD  Date:    09/10/2021  Time:    12:22        Results for orders placed during the hospital encounter of 09/10/21    MRI Cervical Spine Without Contrast    Narrative  EXAMINATION:  MRI CERVICAL SPINE WITHOUT CONTRAST    CLINICAL HISTORY:  Cervical radiculopathy;.  Radiculopathy, cervical region    TECHNIQUE:  Multiplanar, multisequence MR images of the cervical spine were acquired without the administration of contrast.    COMPARISON:  None.    FINDINGS:  Image quality is degraded by motion, lowering exam sensitivity and specificity.  Interpretation is offered within these confines.    Vertebral body heights and alignment are within normal limits. Intervertebral disc spaces are well  preserved.  Marrow signal throughout the visualized osseous structures is within normal limits with no evidence of fracture or marrow replacement process.    Cervical cord is normal in signal and caliber.    Limited evaluation of the cervical soft tissues demonstrates no gross abnormality.    C2-3:   No spinal canal or neural foraminal stenosis.    C3-4:  No spinal canal or neural foraminal stenosis.    C4-5:  No spinal canal or neural foraminal stenosis.    C5-6:  No spinal canal or neural foraminal stenosis.    C6-7:  No spinal canal or neural foraminal stenosis.    C7-T1:  No spinal canal or neural foraminal stenosis.    Impression  Unremarkable MRI of the cervical spine.      Electronically signed by: Jerzy Stevenson MD  Date:    09/10/2021  Time:    12:09      Review of Systems:  CONSTITUTIONAL: patient denies any fever, chills, or weight loss  SKIN: patient denies any rash or itching  RESPIRATORY: patient denies having any shortness of breath  GASTROINTESTINAL: patient denies having any diarrhea, constipation, or bowel incontinence  GENITOURINARY: patient denies having any abnormal bladder function    MUSCULOSKELETAL:  - patient complains of the above noted pain/s (see chief pain complaint)    NEUROLOGICAL:   - pain as above  - strength in Upper extremities is intact, BILATERALLY  - sensation in Upper extremities is intact, BILATERALLY  - patient denies any loss of bowel or bladder control      PSYCHIATRIC: patient denies any change in mood    Other:  All other systems reviewed and are negative      Physical Exam:  LMP 03/28/2022 (Approximate)  (reviewed on 4/22/2022)  General: Alert and oriented, in no apparent distress.  Gait: normal gait.  Skin: No rashes, No discoloration, No obvious lesions  HEENT: Normocephalic, atraumatic. Pupils equal and round.  Cardiovascular: Regular rate and rhythm , no significant peripheral edema present  Respiratory: Without audible wheezing, without use of accessory muscles of  respiration.    Musculoskeletal:    Cervical Spine    - Pain on flexion of cervical spine Absent  - Spurling's Test:  Absent    - Pain on extension of cervical spine Absent  - TTP over the cervical facet joints Present  - Cervical facet loading Absent    Thoracic Spine    - Pain on flexion of Thoracic spine Present  - Pain on extension of Thoracic spine Equivocal  - TTP over the Thoracic facet joints Present T4-5   - Thoracic facet loading Equivocal      Lumbar Spine    - Pain on flexion of lumbar spine Absent  - Straight Leg Raise:  Absent    - Pain on extension of lumbar spine Absent  - TTP over the lumbar facet joints Absent  - Lumbar facet loading Absent    -Pain on palpation over the SI joint  Absent  - ELVIS: Absent      Neuro:    Strength:  UE R/L: D: 5/5; B: 5/5; T: 5/5; WF: 5/5; WE: 5/5; IO: 5/5;  LE R/L: HF: 5/5, HE: 5/5, KF: 5/5; KE: 5/5; FE: 5/5; FF: 5/5    Extremity Reflexes: Brisk and symmetric throughout.      Extremity Sensory: Sensation to pinprick and temperature symmetric. Proprioception intact.      Psych:  Mood and affect is appropriate      Assessment:    Marilee Dumas is a 21 y.o. year old female who is presenting with     Encounter Diagnosis   Name Primary?    Back pain with radiculopathy        Plan:    1. Interventional: Consider T3,4 5, MBB.    2. Pharmacologic: Flexeril 10 mg Po TID PRN. Mobic 7.5 mg PO Q day PRN.     3. Rehabilitative: Patient has already done PT and chiropractic care.     4. Diagnostic: Cervical and Lumbar MRI reviewed. Thoracic xray.     5.  Follow up: In clinic.      20 minutes were spent in this encounter with more than 50% of the time used for counseling and review of the plan.  Imaging / studies reviewed, detailed above.  I discussed in detail the risks, benefits, and alternatives to any and all potential treatment options.  All questions and concerns were fully addressed today in clinic. Medical decision making moderate.    Thank you for the opportunity to  assist in the care of this patient.    Best wishes,    Signed:    Edmar Solis MD          Disclaimer:  This note may have been prepared using voice recognition software, it may have not been extensively proofed, as such there could be errors within the text such as sound alike errors.

## 2022-05-06 ENCOUNTER — OFFICE VISIT (OUTPATIENT)
Dept: PAIN MEDICINE | Facility: CLINIC | Age: 22
End: 2022-05-06
Payer: MEDICAID

## 2022-05-06 ENCOUNTER — HOSPITAL ENCOUNTER (OUTPATIENT)
Dept: RADIOLOGY | Facility: HOSPITAL | Age: 22
Discharge: HOME | End: 2022-05-06
Attending: PHYSICIAN ASSISTANT
Payer: MEDICAID

## 2022-05-06 VITALS
HEART RATE: 105 BPM | HEIGHT: 65 IN | WEIGHT: 101.63 LBS | RESPIRATION RATE: 17 BRPM | DIASTOLIC BLOOD PRESSURE: 78 MMHG | SYSTOLIC BLOOD PRESSURE: 129 MMHG | BODY MASS INDEX: 16.93 KG/M2

## 2022-05-06 DIAGNOSIS — M54.6 CHRONIC BILATERAL THORACIC BACK PAIN: ICD-10-CM

## 2022-05-06 DIAGNOSIS — G89.29 CHRONIC BILATERAL THORACIC BACK PAIN: ICD-10-CM

## 2022-05-06 DIAGNOSIS — M54.6 CHRONIC BILATERAL THORACIC BACK PAIN: Primary | ICD-10-CM

## 2022-05-06 DIAGNOSIS — G89.29 CHRONIC BILATERAL THORACIC BACK PAIN: Primary | ICD-10-CM

## 2022-05-06 PROCEDURE — 3078F DIAST BP <80 MM HG: CPT | Mod: CPTII,,, | Performed by: PHYSICIAN ASSISTANT

## 2022-05-06 PROCEDURE — 99214 OFFICE O/P EST MOD 30 MIN: CPT | Mod: PBBFAC | Performed by: PHYSICIAN ASSISTANT

## 2022-05-06 PROCEDURE — 1160F RVW MEDS BY RX/DR IN RCRD: CPT | Mod: CPTII,,, | Performed by: PHYSICIAN ASSISTANT

## 2022-05-06 PROCEDURE — 1159F PR MEDICATION LIST DOCUMENTED IN MEDICAL RECORD: ICD-10-PCS | Mod: CPTII,,, | Performed by: PHYSICIAN ASSISTANT

## 2022-05-06 PROCEDURE — 3078F PR MOST RECENT DIASTOLIC BLOOD PRESSURE < 80 MM HG: ICD-10-PCS | Mod: CPTII,,, | Performed by: PHYSICIAN ASSISTANT

## 2022-05-06 PROCEDURE — 3074F PR MOST RECENT SYSTOLIC BLOOD PRESSURE < 130 MM HG: ICD-10-PCS | Mod: CPTII,,, | Performed by: PHYSICIAN ASSISTANT

## 2022-05-06 PROCEDURE — 1159F MED LIST DOCD IN RCRD: CPT | Mod: CPTII,,, | Performed by: PHYSICIAN ASSISTANT

## 2022-05-06 PROCEDURE — 99214 PR OFFICE/OUTPT VISIT, EST, LEVL IV, 30-39 MIN: ICD-10-PCS | Mod: S$PBB,,, | Performed by: PHYSICIAN ASSISTANT

## 2022-05-06 PROCEDURE — 1160F PR REVIEW ALL MEDS BY PRESCRIBER/CLIN PHARMACIST DOCUMENTED: ICD-10-PCS | Mod: CPTII,,, | Performed by: PHYSICIAN ASSISTANT

## 2022-05-06 PROCEDURE — 99999 PR PBB SHADOW E&M-EST. PATIENT-LVL IV: CPT | Mod: PBBFAC,,, | Performed by: PHYSICIAN ASSISTANT

## 2022-05-06 PROCEDURE — 72074 XR THORACIC SPINE 4 OR MORE VIEWS: ICD-10-PCS | Mod: 26,,, | Performed by: RADIOLOGY

## 2022-05-06 PROCEDURE — 3008F PR BODY MASS INDEX (BMI) DOCUMENTED: ICD-10-PCS | Mod: CPTII,,, | Performed by: PHYSICIAN ASSISTANT

## 2022-05-06 PROCEDURE — 3008F BODY MASS INDEX DOCD: CPT | Mod: CPTII,,, | Performed by: PHYSICIAN ASSISTANT

## 2022-05-06 PROCEDURE — 3074F SYST BP LT 130 MM HG: CPT | Mod: CPTII,,, | Performed by: PHYSICIAN ASSISTANT

## 2022-05-06 PROCEDURE — 99999 PR PBB SHADOW E&M-EST. PATIENT-LVL IV: ICD-10-PCS | Mod: PBBFAC,,, | Performed by: PHYSICIAN ASSISTANT

## 2022-05-06 PROCEDURE — 72074 X-RAY EXAM THORAC SPINE4/>VW: CPT | Mod: TC

## 2022-05-06 PROCEDURE — 72074 X-RAY EXAM THORAC SPINE4/>VW: CPT | Mod: 26,,, | Performed by: RADIOLOGY

## 2022-05-06 PROCEDURE — 99214 OFFICE O/P EST MOD 30 MIN: CPT | Mod: S$PBB,,, | Performed by: PHYSICIAN ASSISTANT

## 2022-05-16 NOTE — PRE-PROCEDURE INSTRUCTIONS
Spoke with patient regarding procedure scheduled on 5.19    Arrival time 1220    Has patient been sick with fever or on antibiotics within the last 7 days? No    Does the patient have any open wounds, sores or rashes? No    Does the patient have any recent fractures? no    Has patient received a vaccination within the last 7 days? No    Received the COVID vaccination? Yes     Has the patient stopped all medications as directed? na    Does patient have a pacemaker and or defibrillator? no    Does the patient have a ride to and from procedure and someone reliable to remain with patient? mother    Is the patient diabetic? no    Does the patient have sleep apnea? Or use O2 at home? No and no     Is the patient receiving sedation? yes    Is the patient instructed to remain NPO beginning at midnight the night before their procedure? yes    Procedure location confirmed with patient? Yes    Covid- Denies signs/symptoms. Instructed to notify PAT/MD if any changes.

## 2022-05-19 ENCOUNTER — HOSPITAL ENCOUNTER (OUTPATIENT)
Facility: HOSPITAL | Age: 22
Discharge: HOME | End: 2022-05-19
Attending: PHYSICAL MEDICINE & REHABILITATION | Admitting: PHYSICAL MEDICINE & REHABILITATION
Payer: MEDICAID

## 2022-05-19 VITALS
TEMPERATURE: 98 F | WEIGHT: 101.63 LBS | SYSTOLIC BLOOD PRESSURE: 111 MMHG | OXYGEN SATURATION: 100 % | DIASTOLIC BLOOD PRESSURE: 70 MMHG | RESPIRATION RATE: 16 BRPM | BODY MASS INDEX: 16.93 KG/M2 | HEIGHT: 65 IN | HEART RATE: 73 BPM

## 2022-05-19 DIAGNOSIS — M47.814 THORACIC SPONDYLOSIS: Primary | ICD-10-CM

## 2022-05-19 LAB
B-HCG UR QL: NEGATIVE
CTP QC/QA: YES

## 2022-05-19 PROCEDURE — 81025 URINE PREGNANCY TEST: CPT | Performed by: PHYSICAL MEDICINE & REHABILITATION

## 2022-05-19 PROCEDURE — 64491 INJ PARAVERT F JNT C/T 2 LEV: CPT | Mod: 50,KX,, | Performed by: PHYSICAL MEDICINE & REHABILITATION

## 2022-05-19 PROCEDURE — 25000003 PHARM REV CODE 250: Performed by: PHYSICAL MEDICINE & REHABILITATION

## 2022-05-19 PROCEDURE — 64490 PR INJ DX/THER AGNT PARAVERT FACET JOINT,IMG GUIDE,CERV/THORAC, 1ST LEVEL: ICD-10-PCS | Mod: 50,KX,, | Performed by: PHYSICAL MEDICINE & REHABILITATION

## 2022-05-19 PROCEDURE — 64490 INJ PARAVERT F JNT C/T 1 LEV: CPT | Mod: 50 | Performed by: PHYSICAL MEDICINE & REHABILITATION

## 2022-05-19 PROCEDURE — 64490 INJ PARAVERT F JNT C/T 1 LEV: CPT | Mod: 50,KX,, | Performed by: PHYSICAL MEDICINE & REHABILITATION

## 2022-05-19 PROCEDURE — 64491 PR INJ DX/THER AGNT PARAVERT FACET JOINT,IMG GUIDE,CERV/THORAC, 2ND LEVEL: ICD-10-PCS | Mod: 50,KX,, | Performed by: PHYSICAL MEDICINE & REHABILITATION

## 2022-05-19 PROCEDURE — 63600175 PHARM REV CODE 636 W HCPCS: Performed by: PHYSICAL MEDICINE & REHABILITATION

## 2022-05-19 PROCEDURE — 64491 INJ PARAVERT F JNT C/T 2 LEV: CPT | Mod: 50 | Performed by: PHYSICAL MEDICINE & REHABILITATION

## 2022-05-19 RX ORDER — ONDANSETRON 2 MG/ML
4 INJECTION INTRAMUSCULAR; INTRAVENOUS ONCE AS NEEDED
Status: ACTIVE | OUTPATIENT
Start: 2022-05-19 | End: 2033-10-15

## 2022-05-19 RX ORDER — BUPIVACAINE HYDROCHLORIDE 5 MG/ML
INJECTION, SOLUTION EPIDURAL; INTRACAUDAL
Status: DISCONTINUED | OUTPATIENT
Start: 2022-05-19 | End: 2022-05-19 | Stop reason: HOSPADM

## 2022-05-19 RX ORDER — MIDAZOLAM HYDROCHLORIDE 1 MG/ML
INJECTION, SOLUTION INTRAMUSCULAR; INTRAVENOUS
Status: DISCONTINUED | OUTPATIENT
Start: 2022-05-19 | End: 2022-05-19 | Stop reason: HOSPADM

## 2022-05-19 RX ORDER — FENTANYL CITRATE 50 UG/ML
INJECTION, SOLUTION INTRAMUSCULAR; INTRAVENOUS
Status: DISCONTINUED | OUTPATIENT
Start: 2022-05-19 | End: 2022-05-19 | Stop reason: HOSPADM

## 2022-05-19 NOTE — H&P
HPI  Patient presenting for Procedure(s) (LRB):  bilateral T3-5 diagnostic MBB RN IV Sedation (Left)     Patient on Anti-coagulation No    No health changes since previous encounter    Past Medical History:   Diagnosis Date    Neck pain      History reviewed. No pertinent surgical history.  Review of patient's allergies indicates:   Allergen Reactions    Penicillins         No current facility-administered medications on file prior to encounter.     Current Outpatient Medications on File Prior to Encounter   Medication Sig Dispense Refill    cyclobenzaprine (FLEXERIL) 10 MG tablet Take 1 tablet (10 mg total) by mouth 3 (three) times daily as needed for Muscle spasms. 60 tablet 2    meloxicam (MOBIC) 7.5 MG tablet Take 2 tablets (15 mg total) by mouth daily as needed for Pain. 40 tablet 1    albuterol (PROVENTIL/VENTOLIN HFA) 90 mcg/actuation inhaler Inhale 1-2 puffs into the lungs every 6 (six) hours as needed for Wheezing. 8 g 0    ibuprofen (ADVIL,MOTRIN) 800 MG tablet Take 1 tablet (800 mg total) by mouth 3 (three) times daily as needed for Pain. 45 tablet 1    lactulose (CHRONULAC) 10 gram/15 mL solution Take 15 mLs (10 g total) by mouth 3 (three) times daily as needed (constipation). 135 mL 0    loratadine (CLARITIN) 10 mg tablet Take 10 mg by mouth once daily.      ondansetron (ZOFRAN-ODT) 4 MG TbDL Take 1 tablet (4 mg total) by mouth every 6 (six) hours as needed. 15 tablet 0    pantoprazole (PROTONIX) 40 MG tablet Take 1 tablet (40 mg total) by mouth once daily. 30 tablet 3    sumatriptan (IMITREX) 50 MG tablet Take at onset of migrane/headache.  If headache still remains, take a second dose.  Max 2 tablets per 24 hours 12 tablet 1    [DISCONTINUED] minocycline (MINOCIN,DYNACIN) 100 MG capsule Take 1 capsule (100 mg total) by mouth every 12 (twelve) hours. 14 capsule 0        PMHx, PSHx, Allergies, Medications reviewed in epic    ROS negative except pain complaints in HPI    OBJECTIVE:    BP  "135/81 (BP Location: Right arm, Patient Position: Sitting)   Pulse 85   Temp 98.1 °F (36.7 °C) (Temporal)   Resp 16   Ht 5' 5" (1.651 m)   Wt 46.1 kg (101 lb 10.1 oz)   SpO2 100%   Breastfeeding No   BMI 16.91 kg/m²     PHYSICAL EXAMINATION:    GENERAL: Well appearing, in no acute distress, alert and oriented x3.  PSYCH:  Mood and affect appropriate.  SKIN: Skin color, texture, turgor normal, no rashes or lesions which will impact the procedure.  CV: RRR with palpation of the radial artery.  PULM: No evidence of respiratory difficulty, symmetric chest rise. Clear to auscultation.  NEURO: Cranial nerves grossly intact.    Plan:    Proceed with procedure as planned Procedure(s) (LRB):  bilateral T3-5 diagnostic MBB RN IV Sedation (Left)    Roland Glass MD  05/19/2022            "

## 2022-05-19 NOTE — DISCHARGE INSTRUCTIONS

## 2022-05-19 NOTE — DISCHARGE SUMMARY
Discharge Note  Short Stay      SUMMARY     Admit Date: 5/19/2022    Attending Physician: Roland Glass MD        Discharge Physician: Roland Glass MD        Discharge Date: 5/19/2022 1:41 PM    Procedure(s) (LRB):  bilateral T3-5 diagnostic MBB RN IV Sedation (Left)    Final Diagnosis: Chronic bilateral thoracic back pain [M54.6, G89.29]    Disposition: Home or self care    Patient Instructions:   Current Discharge Medication List      CONTINUE these medications which have NOT CHANGED    Details   cyclobenzaprine (FLEXERIL) 10 MG tablet Take 1 tablet (10 mg total) by mouth 3 (three) times daily as needed for Muscle spasms.  Qty: 60 tablet, Refills: 2      meloxicam (MOBIC) 7.5 MG tablet Take 2 tablets (15 mg total) by mouth daily as needed for Pain.  Qty: 40 tablet, Refills: 1    Comments: 1 twice daily prn pain      albuterol (PROVENTIL/VENTOLIN HFA) 90 mcg/actuation inhaler Inhale 1-2 puffs into the lungs every 6 (six) hours as needed for Wheezing.  Qty: 8 g, Refills: 0      ibuprofen (ADVIL,MOTRIN) 800 MG tablet Take 1 tablet (800 mg total) by mouth 3 (three) times daily as needed for Pain.  Qty: 45 tablet, Refills: 1      lactulose (CHRONULAC) 10 gram/15 mL solution Take 15 mLs (10 g total) by mouth 3 (three) times daily as needed (constipation).  Qty: 135 mL, Refills: 0      loratadine (CLARITIN) 10 mg tablet Take 10 mg by mouth once daily.      ondansetron (ZOFRAN-ODT) 4 MG TbDL Take 1 tablet (4 mg total) by mouth every 6 (six) hours as needed.  Qty: 15 tablet, Refills: 0      pantoprazole (PROTONIX) 40 MG tablet Take 1 tablet (40 mg total) by mouth once daily.  Qty: 30 tablet, Refills: 3      sumatriptan (IMITREX) 50 MG tablet Take at onset of migrane/headache.  If headache still remains, take a second dose.  Max 2 tablets per 24 hours  Qty: 12 tablet, Refills: 1                 Discharge Diagnosis: Chronic bilateral thoracic back pain [M54.6, G89.29]  Condition on Discharge: Stable with no  complications to procedure   Diet on Discharge: Same as before.  Activity: as per instruction sheet.  Discharge to: Home with a responsible adult.  Follow up: 2-4 weeks       Please call the office at [phone] if you experience any weakness or loss of sensation, fever > 101.5, pain uncontrolled with oral medications, persistent nausea/vomiting/or diarrhea, redness or drainage from the incisions, or any other worrisome concerns. If physician on call was not reached or could not communicate with our office for any reason please go to the nearest emergency department

## 2022-05-19 NOTE — OP NOTE
CERVICAL Medial Branch Block Under Fluoroscopy  Time-out taken to identify patient and procedure side prior to starting the procedure.        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  The patient was hemodynamically stable throughout the entire process was responsive to voice, and breathing spontaneously.  Supplemental O2 was provided at 2L/min via nasal cannula.  Patient was comfortable for the duration of the procedure.     There was a total of 2mg IV Midazolam and 50mcg Fentanyl titrated for the procedure    Total sedation time was >10minutes and <20minutes      ESTIMATED BLOOD LOSS:  None.    COMPLICATIONS:  None.    TECHNIQUE:   Laying in a prone position, the patient was prepped and draped in the usual sterile fashion using ChloraPrep and fenestrated drape.  The level was determined under fluoroscopic guidance.  Local anesthetic was given by going down to the hub of the 27-gauge 1.25in needle and raising a wheel.  A 22-gauge 3.5inch needle was introduced to the anatomic local of the medial branch at each of the stated above levels using fluoroscopy. Then after negative aspiration, the medication was injected slowly. The patient tolerated the procedure well.       The patient was monitored after the procedure.  Patient was given post procedure and discharge  instructions to follow at home.  We will see the patient back in two weeks or the patient may call to inform of status. The patient was discharged in a stable condition

## 2022-05-23 ENCOUNTER — TELEPHONE (OUTPATIENT)
Dept: PAIN MEDICINE | Facility: CLINIC | Age: 22
End: 2022-05-23
Payer: MEDICAID

## 2022-05-23 NOTE — TELEPHONE ENCOUNTER
----- Message from Hector Brown sent at 5/23/2022  1:25 PM CDT -----  Contact: [phone]  Patient is calling regarding pain diary , states someone was suppose to call pt back to go over results . Please call back at [phone] . Thanks/dj

## 2022-05-23 NOTE — TELEPHONE ENCOUNTER
Pt stated that she is still in pain. Her follow up appointment was moved to 6/1/2022. Pt wanted refill on medication that her PCP prescribes. I informed pt to contact PCP for those refills.

## 2022-05-31 ENCOUNTER — TELEPHONE (OUTPATIENT)
Dept: PAIN MEDICINE | Facility: CLINIC | Age: 22
End: 2022-05-31
Payer: MEDICAID

## 2022-06-01 ENCOUNTER — OFFICE VISIT (OUTPATIENT)
Dept: PAIN MEDICINE | Facility: CLINIC | Age: 22
End: 2022-06-01
Payer: MEDICAID

## 2022-06-01 VITALS
SYSTOLIC BLOOD PRESSURE: 111 MMHG | HEART RATE: 119 BPM | HEIGHT: 65 IN | DIASTOLIC BLOOD PRESSURE: 72 MMHG | BODY MASS INDEX: 17.56 KG/M2 | WEIGHT: 105.38 LBS

## 2022-06-01 DIAGNOSIS — M79.7 FIBROMYALGIA: Primary | ICD-10-CM

## 2022-06-01 PROCEDURE — 3078F DIAST BP <80 MM HG: CPT | Mod: CPTII,,, | Performed by: PHYSICIAN ASSISTANT

## 2022-06-01 PROCEDURE — 1160F RVW MEDS BY RX/DR IN RCRD: CPT | Mod: CPTII,,, | Performed by: PHYSICIAN ASSISTANT

## 2022-06-01 PROCEDURE — 99214 OFFICE O/P EST MOD 30 MIN: CPT | Mod: S$PBB,,, | Performed by: PHYSICIAN ASSISTANT

## 2022-06-01 PROCEDURE — 3008F BODY MASS INDEX DOCD: CPT | Mod: CPTII,,, | Performed by: PHYSICIAN ASSISTANT

## 2022-06-01 PROCEDURE — 3074F PR MOST RECENT SYSTOLIC BLOOD PRESSURE < 130 MM HG: ICD-10-PCS | Mod: CPTII,,, | Performed by: PHYSICIAN ASSISTANT

## 2022-06-01 PROCEDURE — 3074F SYST BP LT 130 MM HG: CPT | Mod: CPTII,,, | Performed by: PHYSICIAN ASSISTANT

## 2022-06-01 PROCEDURE — 3008F PR BODY MASS INDEX (BMI) DOCUMENTED: ICD-10-PCS | Mod: CPTII,,, | Performed by: PHYSICIAN ASSISTANT

## 2022-06-01 PROCEDURE — 1159F PR MEDICATION LIST DOCUMENTED IN MEDICAL RECORD: ICD-10-PCS | Mod: CPTII,,, | Performed by: PHYSICIAN ASSISTANT

## 2022-06-01 PROCEDURE — 99213 OFFICE O/P EST LOW 20 MIN: CPT | Mod: PBBFAC | Performed by: PHYSICIAN ASSISTANT

## 2022-06-01 PROCEDURE — 1159F MED LIST DOCD IN RCRD: CPT | Mod: CPTII,,, | Performed by: PHYSICIAN ASSISTANT

## 2022-06-01 PROCEDURE — 99214 PR OFFICE/OUTPT VISIT, EST, LEVL IV, 30-39 MIN: ICD-10-PCS | Mod: S$PBB,,, | Performed by: PHYSICIAN ASSISTANT

## 2022-06-01 PROCEDURE — 1160F PR REVIEW ALL MEDS BY PRESCRIBER/CLIN PHARMACIST DOCUMENTED: ICD-10-PCS | Mod: CPTII,,, | Performed by: PHYSICIAN ASSISTANT

## 2022-06-01 PROCEDURE — 99999 PR PBB SHADOW E&M-EST. PATIENT-LVL III: ICD-10-PCS | Mod: PBBFAC,,, | Performed by: PHYSICIAN ASSISTANT

## 2022-06-01 PROCEDURE — 3078F PR MOST RECENT DIASTOLIC BLOOD PRESSURE < 80 MM HG: ICD-10-PCS | Mod: CPTII,,, | Performed by: PHYSICIAN ASSISTANT

## 2022-06-01 PROCEDURE — 99999 PR PBB SHADOW E&M-EST. PATIENT-LVL III: CPT | Mod: PBBFAC,,, | Performed by: PHYSICIAN ASSISTANT

## 2022-06-01 RX ORDER — DULOXETIN HYDROCHLORIDE 20 MG/1
20 CAPSULE, DELAYED RELEASE ORAL DAILY
Qty: 30 CAPSULE | Refills: 1 | Status: SHIPPED | OUTPATIENT
Start: 2022-06-01 | End: 2022-06-17 | Stop reason: SDUPTHER

## 2022-06-01 NOTE — PROGRESS NOTES
Chief Pain Complaint:  Thoracic Back Pain     Interval History (6/1/2022): Marilee Dumas presents today for follow-up visit.  she underwent diagnostic bilateral T3-5 MBB on 5/19/22. The patient reports that she had soreness at the injection site immediately after and the next morning her pain was down to a 3/4 (50% relief) but after she got up the pain returned and was worse than before. Reports today the pain is greatest at the base of her cervical spine, mid thoracic spine, and low lumbar spine. Patient reports she has seen rheumatology before at age 17. Unsure of findings at the time, but believes it was consistent with fibromyalgia. Patient taking Flexerl and mobic daily without relief.  The changes lasted 4 weeks so far.  The changes have continued through this visit.  Patient reports pain as 8/10 today.      Interval History (5/9/2022):  Marilee Dumas presents today for follow-up visit for thoracic back pain.  Reports constant midback pain without radiation to anterior torso or extremities. Patient taking Flexerl and mobic daily without relief. Patient reports pain as 9/10 today.       History of Present Illness:   Marilee Dumas is a 22 y.o. female  who is presenting with a chief complaint of Thoracic Back Pain. The patient began experiencing this problem insidiously, and the pain has been gradually worsening over the past 5 year(s). The pain is described as throbbing, cramping, aching and heavy and is located in the bilateral mid back T4-5. Pain is intermittent and lasts hours. The  pain is nonradiating. The patient rates her pain a 7 out of ten and interferes with activities of daily living a 7 out of ten. Pain is exacerbated by rotation of the thoracic spine, and is improved by rest. Patient reports prior trauma patient fell from a swing 5 years ago, no prior spinal surgery     - pertinent negatives: No fever, No chills, No weight loss, No bladder dysfunction, No bowel dysfunction, No  saddle anesthesia  - pertinent positives: none    - medications, other therapies tried (physical therapy, injections):     >> NSAIDs, Tylenol, Tramadol, gabapentin, flexeril and medrol dose pack    >> Has previously undergone Physical Therapy    >> diagnostic bilateral T3-5 MBB on 5/19/22 with marginal relief      Imaging / Labs / Studies (reviewed on 6/1/2022):    X-ray thoracic spine 05/06/2022  FINDINGS:  There is mild levoscoliosis of the lower thoracic spine.  Vertebral body heights are well maintained.  No spondylolisthesis demonstrated.  Intervertebral disc heights are appear preserved.  No acute fractures or subluxations visualized.       Results for orders placed during the hospital encounter of 09/10/21    MRI Lumbar Spine Without Contrast    Narrative  EXAMINATION:  MRI LUMBAR SPINE WITHOUT CONTRAST    CLINICAL HISTORY:  Back pain or radiculopathy, > 6 wks; Dorsalgia, unspecified    TECHNIQUE:  Multiplanar, multisequence MR images were acquired from the thoracolumbar junction to the sacrum without the administration of contrast.    COMPARISON:  None.    FINDINGS:  Alignment: There is slight rightward rotation of multiple upper lumbar vertebral bodies, likely related to mild thoracic scoliosis.    Vertebrae: Normal marrow signal. No fracture.    Discs: Normal height and signal.    Cord: Normal.  Conus terminates at L1    Degenerative findings:    No significant disc bulge, spinal canal stenosis, or neural foraminal narrowing.    Paraspinal muscles & soft tissues: Unremarkable.    Impression  As above.  No acute findings.      Electronically signed by: Jerzy Stevenson MD  Date:    09/10/2021  Time:    12:22        Results for orders placed during the hospital encounter of 09/10/21    MRI Cervical Spine Without Contrast    Narrative  EXAMINATION:  MRI CERVICAL SPINE WITHOUT CONTRAST    CLINICAL HISTORY:  Cervical radiculopathy;.  Radiculopathy, cervical region    TECHNIQUE:  Multiplanar, multisequence MR  images of the cervical spine were acquired without the administration of contrast.    COMPARISON:  None.    FINDINGS:  Image quality is degraded by motion, lowering exam sensitivity and specificity.  Interpretation is offered within these confines.    Vertebral body heights and alignment are within normal limits. Intervertebral disc spaces are well preserved.  Marrow signal throughout the visualized osseous structures is within normal limits with no evidence of fracture or marrow replacement process.    Cervical cord is normal in signal and caliber.    Limited evaluation of the cervical soft tissues demonstrates no gross abnormality.    C2-3:   No spinal canal or neural foraminal stenosis.    C3-4:  No spinal canal or neural foraminal stenosis.    C4-5:  No spinal canal or neural foraminal stenosis.    C5-6:  No spinal canal or neural foraminal stenosis.    C6-7:  No spinal canal or neural foraminal stenosis.    C7-T1:  No spinal canal or neural foraminal stenosis.    Impression  Unremarkable MRI of the cervical spine.      Electronically signed by: Jerzy Stevenson MD  Date:    09/10/2021  Time:    12:09      Review of Systems:  CONSTITUTIONAL: patient denies any fever, chills, or weight loss  SKIN: patient denies any rash or itching  RESPIRATORY: patient denies having any shortness of breath  GASTROINTESTINAL: patient denies having any diarrhea, constipation, or bowel incontinence  GENITOURINARY: patient denies having any abnormal bladder function    MUSCULOSKELETAL:  - patient complains of the above noted pain/s (see chief pain complaint)    NEUROLOGICAL:   - pain as above  - strength in Upper extremities is intact, BILATERALLY  - sensation in Upper extremities is intact, BILATERALLY  - patient denies any loss of bowel or bladder control      PSYCHIATRIC: patient denies any change in mood    Other:  All other systems reviewed and are negative      Physical Exam:  /72 (BP Location: Left arm, Patient  "Position: Sitting, BP Method: Medium (Automatic))   Pulse (!) 119   Ht 5' 5" (1.651 m)   Wt 47.8 kg (105 lb 6.1 oz)   BMI 17.54 kg/m²  (reviewed on 6/1/2022)  General: Alert and oriented, in no apparent distress.  Gait: normal gait.  Skin: No rashes, No discoloration, No obvious lesions  HEENT: Normocephalic, atraumatic. Pupils equal and round.  Cardiovascular: Regular rate and rhythm , no significant peripheral edema present  Respiratory: Without audible wheezing, without use of accessory muscles of respiration.    Musculoskeletal:    Cervical Spine    - Pain on flexion of cervical spine Present  - Spurling's Test:  Absent    - Pain on extension of cervical spine Present  - TTP over the cervical facet joints Present  - Cervical facet loading Absent    Thoracic Spine    - Pain on flexion of Thoracic spine Present  - Pain on extension of Thoracic spine Equivocal  - TTP over the Thoracic facet joints Present T4-5   - Thoracic facet loading Equivocal      Lumbar Spine    - Pain on flexion of lumbar spine Present  - Straight Leg Raise:  Absent    - Pain on extension of lumbar spine Present  - TTP over the lumbar facet joints Present  - Lumbar facet loading Absent    -Pain on palpation over the SI joint  Present  - ELVIS: Absent      Neuro:    Strength:  UE R/L: D: 5/5; B: 5/5; T: 5/5; WF: 5/5; WE: 5/5; IO: 5/5;  LE R/L: HF: 5/5, HE: 5/5, KF: 5/5; KE: 5/5; FE: 5/5; FF: 5/5    Extremity Reflexes: Brisk and symmetric throughout.      Extremity Sensory: Sensation to pinprick and temperature symmetric. Proprioception intact.      Psych:  Mood and affect is appropriate      Assessment:    Marilee Dumas is a 22 y.o. year old female who is presenting with     Encounter Diagnosis   Name Primary?    Fibromyalgia Yes       Plan:    1. Interventional: None at this time  S/p T3,4 5, diagnostic only MBB with only marginal relief for less than 24 hours    2. Pharmacologic: Flexeril 10 mg Po TID PRN. Mobic 7.5 mg PO Q day " PRN.   -Start Cymbalta 20 mg tablet to see if this helps with widespread pain. Will consider dose increase if tolerating well    3. Rehabilitative: Patient has already done PT and chiropractic care.     4. Diagnostic: Cervical and Lumbar MRI reviewed. Thoracic xray reviewed    5. Consults/referral: Refer to Rheumatology for further evaluation    5.  Follow up: 4 weeks for med check    Ana Carbone PA-C  Interventional Pain medicine           Disclaimer:  This note may have been prepared using voice recognition software, it may have not been extensively proofed, as such there could be errors within the text such as sound alike errors.

## 2022-06-13 ENCOUNTER — TELEPHONE (OUTPATIENT)
Dept: PAIN MEDICINE | Facility: CLINIC | Age: 22
End: 2022-06-13
Payer: MEDICAID

## 2022-06-13 NOTE — TELEPHONE ENCOUNTER
Called patient and schedule virtual visit for 06/17/2022 per MAGDY Carbone----- Message from Ana Carbone PA-C sent at 6/13/2022  3:19 PM CDT -----  Regarding: FW: medication  Contact: Marilee  Schedule follow up on Friday, can be virtual or in person    Ana  ----- Message -----  From: Pepe Wood MA  Sent: 6/13/2022   2:33 PM CDT  To: Ana Carbone PA-C  Subject: medication                                       Mrs. Dumas states she has to take 2 capsules of  cymbalta because of the pain and when it wears off it hurts more.  She would like to talk to you about this.  ----- Message -----  From: Ruth Ann Carbajal  Sent: 6/13/2022  10:54 AM CDT  To: Raf Perez Staff    Patient is calling to speak to the nurse regarding medication. Reports having to take more medication. Please give patient a call back [phone].

## 2022-06-17 ENCOUNTER — OFFICE VISIT (OUTPATIENT)
Dept: PAIN MEDICINE | Facility: CLINIC | Age: 22
End: 2022-06-17
Payer: MEDICAID

## 2022-06-17 DIAGNOSIS — M79.7 FIBROMYALGIA: ICD-10-CM

## 2022-06-17 PROCEDURE — 1159F MED LIST DOCD IN RCRD: CPT | Mod: CPTII,95,, | Performed by: PHYSICIAN ASSISTANT

## 2022-06-17 PROCEDURE — 1159F PR MEDICATION LIST DOCUMENTED IN MEDICAL RECORD: ICD-10-PCS | Mod: CPTII,95,, | Performed by: PHYSICIAN ASSISTANT

## 2022-06-17 PROCEDURE — 99214 OFFICE O/P EST MOD 30 MIN: CPT | Mod: 95,,, | Performed by: PHYSICIAN ASSISTANT

## 2022-06-17 PROCEDURE — 1160F RVW MEDS BY RX/DR IN RCRD: CPT | Mod: CPTII,95,, | Performed by: PHYSICIAN ASSISTANT

## 2022-06-17 PROCEDURE — 1160F PR REVIEW ALL MEDS BY PRESCRIBER/CLIN PHARMACIST DOCUMENTED: ICD-10-PCS | Mod: CPTII,95,, | Performed by: PHYSICIAN ASSISTANT

## 2022-06-17 PROCEDURE — 99214 PR OFFICE/OUTPT VISIT, EST, LEVL IV, 30-39 MIN: ICD-10-PCS | Mod: 95,,, | Performed by: PHYSICIAN ASSISTANT

## 2022-06-17 RX ORDER — AMITRIPTYLINE HYDROCHLORIDE 10 MG/1
10 TABLET, FILM COATED ORAL NIGHTLY PRN
Qty: 30 TABLET | Refills: 1 | Status: SHIPPED | OUTPATIENT
Start: 2022-06-17 | End: 2022-07-13

## 2022-06-17 RX ORDER — DULOXETIN HYDROCHLORIDE 20 MG/1
40 CAPSULE, DELAYED RELEASE ORAL DAILY
Qty: 60 CAPSULE | Refills: 0 | Status: SHIPPED | OUTPATIENT
Start: 2022-06-17 | End: 2022-07-13 | Stop reason: SINTOL

## 2022-06-17 NOTE — PROGRESS NOTES
Chief Pain Complaint:  Thoracic Back Pain     The patient location is: home  The chief complaint leading to consultation is: chronic pain     Visit type: audiovisual    Face to Face time with patient: 10-15 minutes  20 minutes of total time spent on the encounter, which includes face to face time and non-face to face time preparing to see the patient (eg, review of tests), Obtaining and/or reviewing separately obtained history, Documenting clinical information in the electronic or other health record, Independently interpreting results (not separately reported) and communicating results to the patient/family/caregiver, or Care coordination (not separately reported).     Each patient to whom he or she provides medical services by telemedicine is:  (1) informed of the relationship between the physician and patient and the respective role of any other health care provider with respect to management of the patient; and (2) notified that he or she may decline to receive medical services by telemedicine and may withdraw from such care at any time.      Interval History (6/17/2022):  Marilee Dumas presents today for  telemed follow-up visit.  Patient was last seen on 6/1/2022. At that visit, the plan was to initiate Celebrex 20 mg once daily to see if this helped with widespread pain.  Patient reports this medication did offer relief for about 2 hours.  Patient admits that some days she takes the medication twice a day once at 9:00 a.m. and 3:00 p.m. with relief.  Patient reports continued nighttime pain and difficulty sleeping secondary to pain. Patient reports pain as 8/10 today.    Interval History (6/1/2022): Marilee Dumsa presents today for follow-up visit.  she underwent diagnostic bilateral T3-5 MBB on 5/19/22. The patient reports that she had soreness at the injection site immediately after and the next morning her pain was down to a 3/4 (50% relief) but after she got up the pain returned and was worse  than before. Reports today the pain is greatest at the base of her cervical spine, mid thoracic spine, and low lumbar spine. Patient reports she has seen rheumatology before at age 17. Unsure of findings at the time, but believes it was consistent with fibromyalgia. Patient taking Flexerl and mobic daily without relief.  The changes lasted 4 weeks so far.  The changes have continued through this visit.  Patient reports pain as 8/10 today.      Interval History (5/9/2022):  Marilee Dumas presents today for follow-up visit for thoracic back pain.  Reports constant midback pain without radiation to anterior torso or extremities. Patient taking Flexerl and mobic daily without relief. Patient reports pain as 9/10 today.       History of Present Illness:   Marilee Dumas is a 22 y.o. female  who is presenting with a chief complaint of Thoracic Back Pain. The patient began experiencing this problem insidiously, and the pain has been gradually worsening over the past 5 year(s). The pain is described as throbbing, cramping, aching and heavy and is located in the bilateral mid back T4-5. Pain is intermittent and lasts hours. The  pain is nonradiating. The patient rates her pain a 7 out of ten and interferes with activities of daily living a 7 out of ten. Pain is exacerbated by rotation of the thoracic spine, and is improved by rest. Patient reports prior trauma patient fell from a swing 5 years ago, no prior spinal surgery     - pertinent negatives: No fever, No chills, No weight loss, No bladder dysfunction, No bowel dysfunction, No saddle anesthesia  - pertinent positives: none    - medications, other therapies tried (physical therapy, injections):     >> NSAIDs, Tylenol, Tramadol, gabapentin, flexeril and medrol dose pack    >> Has previously undergone Physical Therapy    >> diagnostic bilateral T3-5 MBB on 5/19/22 with marginal relief      Imaging / Labs / Studies (reviewed on 6/17/2022):    X-ray thoracic spine  05/06/2022  FINDINGS:  There is mild levoscoliosis of the lower thoracic spine.  Vertebral body heights are well maintained.  No spondylolisthesis demonstrated.  Intervertebral disc heights are appear preserved.  No acute fractures or subluxations visualized.       Results for orders placed during the hospital encounter of 09/10/21    MRI Lumbar Spine Without Contrast    Narrative  EXAMINATION:  MRI LUMBAR SPINE WITHOUT CONTRAST    CLINICAL HISTORY:  Back pain or radiculopathy, > 6 wks; Dorsalgia, unspecified    TECHNIQUE:  Multiplanar, multisequence MR images were acquired from the thoracolumbar junction to the sacrum without the administration of contrast.    COMPARISON:  None.    FINDINGS:  Alignment: There is slight rightward rotation of multiple upper lumbar vertebral bodies, likely related to mild thoracic scoliosis.    Vertebrae: Normal marrow signal. No fracture.    Discs: Normal height and signal.    Cord: Normal.  Conus terminates at L1    Degenerative findings:    No significant disc bulge, spinal canal stenosis, or neural foraminal narrowing.    Paraspinal muscles & soft tissues: Unremarkable.    Impression  As above.  No acute findings.      Electronically signed by: Jerzy Stevenson MD  Date:    09/10/2021  Time:    12:22        Results for orders placed during the hospital encounter of 09/10/21    MRI Cervical Spine Without Contrast    Narrative  EXAMINATION:  MRI CERVICAL SPINE WITHOUT CONTRAST    CLINICAL HISTORY:  Cervical radiculopathy;.  Radiculopathy, cervical region    TECHNIQUE:  Multiplanar, multisequence MR images of the cervical spine were acquired without the administration of contrast.    COMPARISON:  None.    FINDINGS:  Image quality is degraded by motion, lowering exam sensitivity and specificity.  Interpretation is offered within these confines.    Vertebral body heights and alignment are within normal limits. Intervertebral disc spaces are well preserved.  Marrow signal throughout the  visualized osseous structures is within normal limits with no evidence of fracture or marrow replacement process.    Cervical cord is normal in signal and caliber.    Limited evaluation of the cervical soft tissues demonstrates no gross abnormality.    C2-3:   No spinal canal or neural foraminal stenosis.    C3-4:  No spinal canal or neural foraminal stenosis.    C4-5:  No spinal canal or neural foraminal stenosis.    C5-6:  No spinal canal or neural foraminal stenosis.    C6-7:  No spinal canal or neural foraminal stenosis.    C7-T1:  No spinal canal or neural foraminal stenosis.    Impression  Unremarkable MRI of the cervical spine.      Electronically signed by: Jerzy Stevenson MD  Date:    09/10/2021  Time:    12:09      Review of Systems:  CONSTITUTIONAL: patient denies any fever, chills, or weight loss  SKIN: patient denies any rash or itching  RESPIRATORY: patient denies having any shortness of breath  GASTROINTESTINAL: patient denies having any diarrhea, constipation, or bowel incontinence  GENITOURINARY: patient denies having any abnormal bladder function    MUSCULOSKELETAL:  - patient complains of the above noted pain/s (see chief pain complaint)    NEUROLOGICAL:   - pain as above  - strength in Upper extremities is intact, BILATERALLY  - sensation in Upper extremities is intact, BILATERALLY  - patient denies any loss of bowel or bladder control      PSYCHIATRIC: patient denies any change in mood    Other:  All other systems reviewed and are negative    Telemedicine Exam  There were no vitals filed for this visit.  There is no height or weight on file to calculate BMI.   (reviewed on 6/17/2022)     GENERAL: Well appearing, in no acute distress, alert and oriented x3.  Cooperative.  PSYCH:  Mood and affect appropriate.  SKIN: Skin color & texture with no obvious abnormalities.    HEAD/FACE:  Normocephalic, atraumatic.    PULM:  No difficulty breathing. No nasal flaring. No obvious wheezing.  NECK: ROM  fairly preserved.  Supple.   BACK: Palpation over the lumbar paraspinous muscles causes pain. Some pain with flexion. Some pain with extension.    EXTREMITIES: No obvious deformities. Moving all extremities well, appears to have symmetric strength throughout.  MUSCULOSKELETAL: No obvious atrophy abnormalities are noted.   NEURO: No obvious neurologic deficit.   GAIT: sitting.     Physical Exam: last in clinic visit:        Physical Exam:  There were no vitals taken for this visit. (reviewed on 6/17/2022)  General: Alert and oriented, in no apparent distress.  Gait: normal gait.  Skin: No rashes, No discoloration, No obvious lesions  HEENT: Normocephalic, atraumatic. Pupils equal and round.  Cardiovascular: Regular rate and rhythm , no significant peripheral edema present  Respiratory: Without audible wheezing, without use of accessory muscles of respiration.    Musculoskeletal:    Cervical Spine    - Pain on flexion of cervical spine Present  - Spurling's Test:  Absent    - Pain on extension of cervical spine Present  - TTP over the cervical facet joints Present  - Cervical facet loading Absent    Thoracic Spine    - Pain on flexion of Thoracic spine Present  - Pain on extension of Thoracic spine Equivocal  - TTP over the Thoracic facet joints Present T4-5   - Thoracic facet loading Equivocal      Lumbar Spine    - Pain on flexion of lumbar spine Present  - Straight Leg Raise:  Absent    - Pain on extension of lumbar spine Present  - TTP over the lumbar facet joints Present  - Lumbar facet loading Absent    -Pain on palpation over the SI joint  Present  - ELVIS: Absent      Neuro:    Strength:  UE R/L: D: 5/5; B: 5/5; T: 5/5; WF: 5/5; WE: 5/5; IO: 5/5;  LE R/L: HF: 5/5, HE: 5/5, KF: 5/5; KE: 5/5; FE: 5/5; FF: 5/5    Extremity Reflexes: Brisk and symmetric throughout.      Extremity Sensory: Sensation to pinprick and temperature symmetric. Proprioception intact.      Psych:  Mood and affect is  appropriate      Assessment:    Marilee Dumas is a 22 y.o. year old female who is presenting with     No diagnosis found.    Plan:    1. Interventional: None at this time  S/p T3,4 5, diagnostic only MBB with only marginal relief for less than 24 hours    2. Pharmacologic: Flexeril 10 mg Po TID PRN. Mobic 7.5 mg PO Q day PRN.   -increase Cymbalta from 20 mg once daily to 40 mg once daily.  Add:  Elavil 10 mg q.h.s..    3. Rehabilitative: Patient has already done PT and chiropractic care.     4. Diagnostic: Cervical and Lumbar MRI reviewed. Thoracic xray reviewed    5. Consults/referral: Refer to Rheumatology for further evaluation    5.  Follow up: 42weeks for med check    Ana Carbone PA-C  Interventional Pain medicine           Disclaimer:  This note may have been prepared using voice recognition software, it may have not been extensively proofed, as such there could be errors within the text such as sound alike errors.

## 2022-06-29 ENCOUNTER — OFFICE VISIT (OUTPATIENT)
Dept: PAIN MEDICINE | Facility: CLINIC | Age: 22
End: 2022-06-29
Payer: MEDICAID

## 2022-06-29 VITALS
HEART RATE: 119 BPM | SYSTOLIC BLOOD PRESSURE: 118 MMHG | DIASTOLIC BLOOD PRESSURE: 70 MMHG | RESPIRATION RATE: 17 BRPM | HEIGHT: 65 IN | BODY MASS INDEX: 19.16 KG/M2 | WEIGHT: 115 LBS

## 2022-06-29 DIAGNOSIS — G89.4 CHRONIC PAIN DISORDER: ICD-10-CM

## 2022-06-29 DIAGNOSIS — M43.06 SPONDYLOLYSIS OF LUMBAR REGION: Primary | ICD-10-CM

## 2022-06-29 PROCEDURE — 3078F PR MOST RECENT DIASTOLIC BLOOD PRESSURE < 80 MM HG: ICD-10-PCS | Mod: CPTII,,, | Performed by: PHYSICIAN ASSISTANT

## 2022-06-29 PROCEDURE — 3074F SYST BP LT 130 MM HG: CPT | Mod: CPTII,,, | Performed by: PHYSICIAN ASSISTANT

## 2022-06-29 PROCEDURE — 3008F PR BODY MASS INDEX (BMI) DOCUMENTED: ICD-10-PCS | Mod: CPTII,,, | Performed by: PHYSICIAN ASSISTANT

## 2022-06-29 PROCEDURE — 99999 PR PBB SHADOW E&M-EST. PATIENT-LVL III: ICD-10-PCS | Mod: PBBFAC,,, | Performed by: PHYSICIAN ASSISTANT

## 2022-06-29 PROCEDURE — 99214 OFFICE O/P EST MOD 30 MIN: CPT | Mod: S$PBB,,, | Performed by: PHYSICIAN ASSISTANT

## 2022-06-29 PROCEDURE — 1159F MED LIST DOCD IN RCRD: CPT | Mod: CPTII,,, | Performed by: PHYSICIAN ASSISTANT

## 2022-06-29 PROCEDURE — 1159F PR MEDICATION LIST DOCUMENTED IN MEDICAL RECORD: ICD-10-PCS | Mod: CPTII,,, | Performed by: PHYSICIAN ASSISTANT

## 2022-06-29 PROCEDURE — 3008F BODY MASS INDEX DOCD: CPT | Mod: CPTII,,, | Performed by: PHYSICIAN ASSISTANT

## 2022-06-29 PROCEDURE — 1160F RVW MEDS BY RX/DR IN RCRD: CPT | Mod: CPTII,,, | Performed by: PHYSICIAN ASSISTANT

## 2022-06-29 PROCEDURE — 99999 PR PBB SHADOW E&M-EST. PATIENT-LVL III: CPT | Mod: PBBFAC,,, | Performed by: PHYSICIAN ASSISTANT

## 2022-06-29 PROCEDURE — 99214 PR OFFICE/OUTPT VISIT, EST, LEVL IV, 30-39 MIN: ICD-10-PCS | Mod: S$PBB,,, | Performed by: PHYSICIAN ASSISTANT

## 2022-06-29 PROCEDURE — 3078F DIAST BP <80 MM HG: CPT | Mod: CPTII,,, | Performed by: PHYSICIAN ASSISTANT

## 2022-06-29 PROCEDURE — 1160F PR REVIEW ALL MEDS BY PRESCRIBER/CLIN PHARMACIST DOCUMENTED: ICD-10-PCS | Mod: CPTII,,, | Performed by: PHYSICIAN ASSISTANT

## 2022-06-29 PROCEDURE — 3074F PR MOST RECENT SYSTOLIC BLOOD PRESSURE < 130 MM HG: ICD-10-PCS | Mod: CPTII,,, | Performed by: PHYSICIAN ASSISTANT

## 2022-06-29 PROCEDURE — 99213 OFFICE O/P EST LOW 20 MIN: CPT | Mod: PBBFAC | Performed by: PHYSICIAN ASSISTANT

## 2022-06-29 RX ORDER — CELECOXIB 100 MG/1
100 CAPSULE ORAL 2 TIMES DAILY PRN
Qty: 60 CAPSULE | Refills: 1 | Status: SHIPPED | OUTPATIENT
Start: 2022-06-29 | End: 2022-07-13 | Stop reason: SDUPTHER

## 2022-06-29 NOTE — PROGRESS NOTES
Chief Pain Complaint:  Thoracic Back Pain     Interval History (6/29/2022):  Marilee Dumas presents today for follow-up visit.  Patient was last seen on 6/17/2022. At that visit, the plan was to Increase Cymbalta to 40 mg once daily and add on Elavil 10 mg nightly. Reports Cymbalta offers some pain relief for 2-3 hours, but admits she has noticed increased instance of depressive episodes or flat affect. Denies any suicidal ideation, but admits she gets more emotional at times. Reports night time Elavil has helped with sleep,though she continues to wake up with pain. Still pending follow up with rheumatology. Patient reports pain as 7/10 today.      Interval History (6/17/2022):  Marilee Dumas presents today for  telemed follow-up visit.  Patient was last seen on 6/1/2022. At that visit, the plan was to initiate Cymbalta 20 mg once daily to see if this helped with widespread pain.  Patient reports this medication did offer relief for about 2 hours.  Patient admits that some days she takes the medication twice a day once at 9:00 a.m. and 3:00 p.m. with relief.  Patient reports continued nighttime pain and difficulty sleeping secondary to pain. Patient reports pain as 8/10 today.    Interval History (6/1/2022): Marilee Dumas presents today for follow-up visit.  she underwent diagnostic bilateral T3-5 MBB on 5/19/22. The patient reports that she had soreness at the injection site immediately after and the next morning her pain was down to a 3/4 (50% relief) but after she got up the pain returned and was worse than before. Reports today the pain is greatest at the base of her cervical spine, mid thoracic spine, and low lumbar spine. Patient reports she has seen rheumatology before at age 17. Unsure of findings at the time, but believes it was consistent with fibromyalgia. Patient taking Flexerl and mobic daily without relief.  The changes lasted 4 weeks so far.  The changes have continued through this  visit.  Patient reports pain as 8/10 today.      Interval History (5/9/2022):  Marilee Dumas presents today for follow-up visit for thoracic back pain.  Reports constant midback pain without radiation to anterior torso or extremities. Patient taking Flexerl and mobic daily without relief. Patient reports pain as 9/10 today.       History of Present Illness:   Marilee Dumas is a 22 y.o. female  who is presenting with a chief complaint of Thoracic Back Pain. The patient began experiencing this problem insidiously, and the pain has been gradually worsening over the past 5 year(s). The pain is described as throbbing, cramping, aching and heavy and is located in the bilateral mid back T4-5. Pain is intermittent and lasts hours. The  pain is nonradiating. The patient rates her pain a 7 out of ten and interferes with activities of daily living a 7 out of ten. Pain is exacerbated by rotation of the thoracic spine, and is improved by rest. Patient reports prior trauma patient fell from a swing 5 years ago, no prior spinal surgery     - pertinent negatives: No fever, No chills, No weight loss, No bladder dysfunction, No bowel dysfunction, No saddle anesthesia  - pertinent positives: none    - medications, other therapies tried (physical therapy, injections):     >> NSAIDs, Tylenol, Tramadol, gabapentin, flexeril and medrol dose pack    >> Has previously undergone Physical Therapy    >> diagnostic bilateral T3-5 MBB on 5/19/22 with marginal relief      Imaging / Labs / Studies (reviewed on 6/29/2022):    X-ray thoracic spine 05/06/2022  FINDINGS:  There is mild levoscoliosis of the lower thoracic spine.  Vertebral body heights are well maintained.  No spondylolisthesis demonstrated.  Intervertebral disc heights are appear preserved.  No acute fractures or subluxations visualized.       Results for orders placed during the hospital encounter of 09/10/21    MRI Lumbar Spine Without  Contrast    Narrative  EXAMINATION:  MRI LUMBAR SPINE WITHOUT CONTRAST    CLINICAL HISTORY:  Back pain or radiculopathy, > 6 wks; Dorsalgia, unspecified    TECHNIQUE:  Multiplanar, multisequence MR images were acquired from the thoracolumbar junction to the sacrum without the administration of contrast.    COMPARISON:  None.    FINDINGS:  Alignment: There is slight rightward rotation of multiple upper lumbar vertebral bodies, likely related to mild thoracic scoliosis.    Vertebrae: Normal marrow signal. No fracture.    Discs: Normal height and signal.    Cord: Normal.  Conus terminates at L1    Degenerative findings:    No significant disc bulge, spinal canal stenosis, or neural foraminal narrowing.    Paraspinal muscles & soft tissues: Unremarkable.    Impression  As above.  No acute findings.      Electronically signed by: Jerzy Stevenson MD  Date:    09/10/2021  Time:    12:22        Results for orders placed during the hospital encounter of 09/10/21    MRI Cervical Spine Without Contrast    Narrative  EXAMINATION:  MRI CERVICAL SPINE WITHOUT CONTRAST    CLINICAL HISTORY:  Cervical radiculopathy;.  Radiculopathy, cervical region    TECHNIQUE:  Multiplanar, multisequence MR images of the cervical spine were acquired without the administration of contrast.    COMPARISON:  None.    FINDINGS:  Image quality is degraded by motion, lowering exam sensitivity and specificity.  Interpretation is offered within these confines.    Vertebral body heights and alignment are within normal limits. Intervertebral disc spaces are well preserved.  Marrow signal throughout the visualized osseous structures is within normal limits with no evidence of fracture or marrow replacement process.    Cervical cord is normal in signal and caliber.    Limited evaluation of the cervical soft tissues demonstrates no gross abnormality.    C2-3:   No spinal canal or neural foraminal stenosis.    C3-4:  No spinal canal or neural foraminal  "stenosis.    C4-5:  No spinal canal or neural foraminal stenosis.    C5-6:  No spinal canal or neural foraminal stenosis.    C6-7:  No spinal canal or neural foraminal stenosis.    C7-T1:  No spinal canal or neural foraminal stenosis.    Impression  Unremarkable MRI of the cervical spine.      Electronically signed by: Jerzy Stevenson MD  Date:    09/10/2021  Time:    12:09      Review of Systems:  CONSTITUTIONAL: patient denies any fever, chills, or weight loss  SKIN: patient denies any rash or itching  RESPIRATORY: patient denies having any shortness of breath  GASTROINTESTINAL: patient denies having any diarrhea, constipation, or bowel incontinence  GENITOURINARY: patient denies having any abnormal bladder function    MUSCULOSKELETAL:  - patient complains of the above noted pain/s (see chief pain complaint)    NEUROLOGICAL:   - pain as above  - strength in Upper extremities is intact, BILATERALLY  - sensation in Upper extremities is intact, BILATERALLY  - patient denies any loss of bowel or bladder control      PSYCHIATRIC: patient denies any change in mood    Other:  All other systems reviewed and are negative    Telemedicine Exam  Vitals:    06/29/22 0952   BP: 118/70   Pulse: (!) 119   Resp: 17   Weight: 52.2 kg (114 lb 15.5 oz)   Height: 5' 5" (1.651 m)     Body mass index is 19.13 kg/m².   (reviewed on 6/29/2022)     GENERAL: Well appearing, in no acute distress, alert and oriented x3.  Cooperative.  PSYCH:  Mood and affect appropriate.  SKIN: Skin color & texture with no obvious abnormalities.    HEAD/FACE:  Normocephalic, atraumatic.    PULM:  No difficulty breathing. No nasal flaring. No obvious wheezing.  NECK: ROM fairly preserved.  Supple.   BACK: Palpation over the lumbar paraspinous muscles causes pain. Some pain with flexion. Some pain with extension.    EXTREMITIES: No obvious deformities. Moving all extremities well, appears to have symmetric strength throughout.  MUSCULOSKELETAL: No obvious " "atrophy abnormalities are noted.   NEURO: No obvious neurologic deficit.   GAIT: sitting.     Physical Exam: last in clinic visit:        Physical Exam:  /70   Pulse (!) 119   Resp 17   Ht 5' 5" (1.651 m)   Wt 52.2 kg (114 lb 15.5 oz)   LMP 06/01/2022 (Approximate)   BMI 19.13 kg/m²  (reviewed on 6/29/2022)  General: Alert and oriented, in no apparent distress.  Gait: normal gait.  Skin: No rashes, No discoloration, No obvious lesions  HEENT: Normocephalic, atraumatic. Pupils equal and round.  Cardiovascular: Regular rate and rhythm , no significant peripheral edema present  Respiratory: Without audible wheezing, without use of accessory muscles of respiration.    Musculoskeletal:    Cervical Spine    - Pain on flexion of cervical spine Present  - Spurling's Test:  Absent    - Pain on extension of cervical spine Present  - TTP over the cervical facet joints Present  - Cervical facet loading Absent    Thoracic Spine    - Pain on flexion of Thoracic spine Present  - Pain on extension of Thoracic spine Equivocal  - TTP over the Thoracic facet joints Present T4-5   - Thoracic facet loading Equivocal      Lumbar Spine    - Pain on flexion of lumbar spine Present  - Straight Leg Raise:  Absent    - Pain on extension of lumbar spine Present  - TTP over the lumbar facet joints Present  - Lumbar facet loading Absent    -Pain on palpation over the SI joint  Present  - ELVIS: Absent      Neuro:    Strength:  UE R/L: D: 5/5; B: 5/5; T: 5/5; WF: 5/5; WE: 5/5; IO: 5/5;  LE R/L: HF: 5/5, HE: 5/5, KF: 5/5; KE: 5/5; FE: 5/5; FF: 5/5    Extremity Reflexes: Brisk and symmetric throughout.      Extremity Sensory: Sensation to pinprick and temperature symmetric. Proprioception intact.      Psych:  Mood and affect is appropriate      Assessment:    Marilee Dumas is a 22 y.o. year old female who is presenting with     Encounter Diagnoses   Name Primary?    Spondylolysis of lumbar region Yes    Chronic pain disorder  "       Plan:    1. Interventional: None at this time  S/p T3,4 5, diagnostic only MBB with only marginal relief for less than 24 hours    2. Pharmacologic: Flexeril 10 mg Po TID PRN.   -Discontinue Mobic and begin Celebrex 100 mg BID for pain  -Discontinue Cymbalta secondary to flat affect and depressive episodes  -Continue  Elavil 10 mg q.h.s..    3. Rehabilitative: Patient has already done PT and chiropractic care.     4. Diagnostic: Cervical and Lumbar MRI reviewed. Thoracic xray reviewed    5. Consults/referral: Referal to Rheumatology for re-evaluation, patient has appointment in September 5.  Follow up: 2 weeks audio visit for med check    Ana Carbone PA-C  Interventional Pain medicine           Disclaimer:  This note may have been prepared using voice recognition software, it may have not been extensively proofed, as such there could be errors within the text such as sound alike errors.

## 2022-07-13 ENCOUNTER — OFFICE VISIT (OUTPATIENT)
Dept: PAIN MEDICINE | Facility: CLINIC | Age: 22
End: 2022-07-13
Payer: MEDICAID

## 2022-07-13 DIAGNOSIS — G89.4 CHRONIC PAIN DISORDER: ICD-10-CM

## 2022-07-13 DIAGNOSIS — M79.7 FIBROMYALGIA: ICD-10-CM

## 2022-07-13 PROCEDURE — 1160F RVW MEDS BY RX/DR IN RCRD: CPT | Mod: CPTII,95,, | Performed by: PHYSICIAN ASSISTANT

## 2022-07-13 PROCEDURE — 1159F PR MEDICATION LIST DOCUMENTED IN MEDICAL RECORD: ICD-10-PCS | Mod: CPTII,95,, | Performed by: PHYSICIAN ASSISTANT

## 2022-07-13 PROCEDURE — 99214 PR OFFICE/OUTPT VISIT, EST, LEVL IV, 30-39 MIN: ICD-10-PCS | Mod: 95,,, | Performed by: PHYSICIAN ASSISTANT

## 2022-07-13 PROCEDURE — 1159F MED LIST DOCD IN RCRD: CPT | Mod: CPTII,95,, | Performed by: PHYSICIAN ASSISTANT

## 2022-07-13 PROCEDURE — 1160F PR REVIEW ALL MEDS BY PRESCRIBER/CLIN PHARMACIST DOCUMENTED: ICD-10-PCS | Mod: CPTII,95,, | Performed by: PHYSICIAN ASSISTANT

## 2022-07-13 PROCEDURE — 99214 OFFICE O/P EST MOD 30 MIN: CPT | Mod: 95,,, | Performed by: PHYSICIAN ASSISTANT

## 2022-07-13 RX ORDER — CELECOXIB 100 MG/1
100 CAPSULE ORAL 2 TIMES DAILY PRN
Qty: 60 CAPSULE | Refills: 1 | Status: SHIPPED | OUTPATIENT
Start: 2022-07-13 | End: 2022-08-12

## 2022-07-13 RX ORDER — AMITRIPTYLINE HYDROCHLORIDE 25 MG/1
25 TABLET, FILM COATED ORAL NIGHTLY PRN
Qty: 30 TABLET | Refills: 1 | Status: SHIPPED | OUTPATIENT
Start: 2022-07-13 | End: 2022-08-15 | Stop reason: SDUPTHER

## 2022-07-13 NOTE — PROGRESS NOTES
Established Patient - Audio Only Telehealth Visit     The patient location is: work  The chief complaint leading to consultation is: med check  Visit type: Virtual visit with audio only (telephone)  Total time spent with patient:10-15       The reason for the audio only service rather than synchronous audio and video virtual visit was related to technical difficulties or patient preference/necessity.     Each patient to whom I provide medical services by telemedicine is:  (1) informed of the relationship between the physician and patient and the respective role of any other health care provider with respect to management of the patient; and (2) notified that they may decline to receive medical services by telemedicine and may withdraw from such care at any time. Patient verbally consented to receive this service via voice-only telephone call.       Chief Pain Complaint:  Thoracic Back Pain       Interval History (7/13/2022):  Marilee Dumas presents today for follow-up visit.  Patient was last seen on 6/29/2022. At that visit, the plan was to discontinue Cymbalta and Mobic and begin Celebrex. Patient reports pain as 10/10 today. Reports no change in pain with medication change. Reports depressed mood, but not having episode of flat affect as she did with Cymbalta. Reports feelings of hopelessness. Denies any suicidal or homicidal ideations, just frustrations about getting better. Reports this morning she experienced nausea and vomiting secondary to severe pain. Has follow up scheduled with rheumatology in 2 months.      Interval History (6/29/2022):  Marilee Dumas presents today for follow-up visit.  Patient was last seen on 6/17/2022. At that visit, the plan was to Increase Cymbalta to 40 mg once daily and add on Elavil 10 mg nightly. Reports Cymbalta offers some pain relief for 2-3 hours, but admits she has noticed increased instance of depressive episodes or flat affect. Denies any suicidal ideation, but  admits she gets more emotional at times. Reports night time Elavil has helped with sleep,though she continues to wake up with pain. Still pending follow up with rheumatology. Patient reports pain as 7/10 today.      Interval History (6/17/2022):  Marilee Dumas presents today for  telemed follow-up visit.  Patient was last seen on 6/1/2022. At that visit, the plan was to initiate Cymbalta 20 mg once daily to see if this helped with widespread pain.  Patient reports this medication did offer relief for about 2 hours.  Patient admits that some days she takes the medication twice a day once at 9:00 a.m. and 3:00 p.m. with relief.  Patient reports continued nighttime pain and difficulty sleeping secondary to pain. Patient reports pain as 8/10 today.    Interval History (6/1/2022): Marilee Dumas presents today for follow-up visit.  she underwent diagnostic bilateral T3-5 MBB on 5/19/22. The patient reports that she had soreness at the injection site immediately after and the next morning her pain was down to a 3/4 (50% relief) but after she got up the pain returned and was worse than before. Reports today the pain is greatest at the base of her cervical spine, mid thoracic spine, and low lumbar spine. Patient reports she has seen rheumatology before at age 17. Unsure of findings at the time, but believes it was consistent with fibromyalgia. Patient taking Flexerl and mobic daily without relief.  The changes lasted 4 weeks so far.  The changes have continued through this visit.  Patient reports pain as 8/10 today.      Interval History (5/9/2022):  Marilee Dumas presents today for follow-up visit for thoracic back pain.  Reports constant midback pain without radiation to anterior torso or extremities. Patient taking Flexerl and mobic daily without relief. Patient reports pain as 9/10 today.       History of Present Illness:   Marilee Dumas is a 22 y.o. female  who is presenting with a chief complaint of  Thoracic Back Pain. The patient began experiencing this problem insidiously, and the pain has been gradually worsening over the past 5 year(s). The pain is described as throbbing, cramping, aching and heavy and is located in the bilateral mid back T4-5. Pain is intermittent and lasts hours. The  pain is nonradiating. The patient rates her pain a 7 out of ten and interferes with activities of daily living a 7 out of ten. Pain is exacerbated by rotation of the thoracic spine, and is improved by rest. Patient reports prior trauma patient fell from a swing 5 years ago, no prior spinal surgery     - pertinent negatives: No fever, No chills, No weight loss, No bladder dysfunction, No bowel dysfunction, No saddle anesthesia  - pertinent positives: none    - medications, other therapies tried (physical therapy, injections):     >> NSAIDs, Tylenol, Tramadol, gabapentin, flexeril and medrol dose pack    >> Has previously undergone Physical Therapy    >> diagnostic bilateral T3-5 MBB on 5/19/22 with marginal relief      Imaging / Labs / Studies (reviewed on 7/13/2022):    X-ray thoracic spine 05/06/2022  FINDINGS:  There is mild levoscoliosis of the lower thoracic spine.  Vertebral body heights are well maintained.  No spondylolisthesis demonstrated.  Intervertebral disc heights are appear preserved.  No acute fractures or subluxations visualized.       Results for orders placed during the hospital encounter of 09/10/21    MRI Lumbar Spine Without Contrast    Narrative  EXAMINATION:  MRI LUMBAR SPINE WITHOUT CONTRAST    CLINICAL HISTORY:  Back pain or radiculopathy, > 6 wks; Dorsalgia, unspecified    TECHNIQUE:  Multiplanar, multisequence MR images were acquired from the thoracolumbar junction to the sacrum without the administration of contrast.    COMPARISON:  None.    FINDINGS:  Alignment: There is slight rightward rotation of multiple upper lumbar vertebral bodies, likely related to mild thoracic scoliosis.    Vertebrae:  Normal marrow signal. No fracture.    Discs: Normal height and signal.    Cord: Normal.  Conus terminates at L1    Degenerative findings:    No significant disc bulge, spinal canal stenosis, or neural foraminal narrowing.    Paraspinal muscles & soft tissues: Unremarkable.    Impression  As above.  No acute findings.      Electronically signed by: Jerzy Stevenson MD  Date:    09/10/2021  Time:    12:22        Results for orders placed during the hospital encounter of 09/10/21    MRI Cervical Spine Without Contrast    Narrative  EXAMINATION:  MRI CERVICAL SPINE WITHOUT CONTRAST    CLINICAL HISTORY:  Cervical radiculopathy;.  Radiculopathy, cervical region    TECHNIQUE:  Multiplanar, multisequence MR images of the cervical spine were acquired without the administration of contrast.    COMPARISON:  None.    FINDINGS:  Image quality is degraded by motion, lowering exam sensitivity and specificity.  Interpretation is offered within these confines.    Vertebral body heights and alignment are within normal limits. Intervertebral disc spaces are well preserved.  Marrow signal throughout the visualized osseous structures is within normal limits with no evidence of fracture or marrow replacement process.    Cervical cord is normal in signal and caliber.    Limited evaluation of the cervical soft tissues demonstrates no gross abnormality.    C2-3:   No spinal canal or neural foraminal stenosis.    C3-4:  No spinal canal or neural foraminal stenosis.    C4-5:  No spinal canal or neural foraminal stenosis.    C5-6:  No spinal canal or neural foraminal stenosis.    C6-7:  No spinal canal or neural foraminal stenosis.    C7-T1:  No spinal canal or neural foraminal stenosis.    Impression  Unremarkable MRI of the cervical spine.      Electronically signed by: Jerzy Stevenson MD  Date:    09/10/2021  Time:    12:09      Review of Systems:  CONSTITUTIONAL: patient denies any fever, chills, or weight loss  SKIN: patient denies any  rash or itching  RESPIRATORY: patient denies having any shortness of breath  GASTROINTESTINAL: patient denies having any diarrhea, constipation, or bowel incontinence  GENITOURINARY: patient denies having any abnormal bladder function    MUSCULOSKELETAL:  - patient complains of the above noted pain/s (see chief pain complaint)    NEUROLOGICAL:   - pain as above  - strength in Upper extremities is intact, BILATERALLY  - sensation in Upper extremities is intact, BILATERALLY  - patient denies any loss of bowel or bladder control      PSYCHIATRIC: patient denies any change in mood    Other:  All other systems reviewed and are negative    Telemedicine Exam  There were no vitals filed for this visit.  There is no height or weight on file to calculate BMI.   (reviewed on 7/13/2022)     GENERAL: Well appearing, in no acute distress, alert and oriented x3.  Cooperative.  PSYCH:  Mood and affect appropriate, tearful at times  SKIN: Skin color & texture with no obvious abnormalities.    HEAD/FACE:  Normocephalic, atraumatic.    PULM:  No difficulty breathing. No nasal flaring. No obvious wheezing.  NECK: ROM fairly preserved.  Supple.   BACK: Palpation over the lumbar paraspinous muscles causes pain. Some pain with flexion. Some pain with extension.    EXTREMITIES: No obvious deformities. Moving all extremities well, appears to have symmetric strength throughout.  MUSCULOSKELETAL: No obvious atrophy abnormalities are noted.   NEURO: No obvious neurologic deficit.   GAIT: sitting.     Physical Exam: last in clinic visit:        Physical Exam:  There were no vitals taken for this visit. (reviewed on 7/13/2022)  General: Alert and oriented, in no apparent distress.  Gait: normal gait.  Skin: No rashes, No discoloration, No obvious lesions  HEENT: Normocephalic, atraumatic. Pupils equal and round.  Cardiovascular: Regular rate and rhythm , no significant peripheral edema present  Respiratory: Without audible wheezing, without use  of accessory muscles of respiration.    Musculoskeletal:    Cervical Spine    - Pain on flexion of cervical spine Present  - Spurling's Test:  Absent    - Pain on extension of cervical spine Present  - TTP over the cervical facet joints Present  - Cervical facet loading Absent    Thoracic Spine    - Pain on flexion of Thoracic spine Present  - Pain on extension of Thoracic spine Equivocal  - TTP over the Thoracic facet joints Present T4-5   - Thoracic facet loading Equivocal      Lumbar Spine    - Pain on flexion of lumbar spine Present  - Straight Leg Raise:  Absent    - Pain on extension of lumbar spine Present  - TTP over the lumbar facet joints Present  - Lumbar facet loading Absent    -Pain on palpation over the SI joint  Present  - ELVIS: Absent      Neuro:    Strength:  UE R/L: D: 5/5; B: 5/5; T: 5/5; WF: 5/5; WE: 5/5; IO: 5/5;  LE R/L: HF: 5/5, HE: 5/5, KF: 5/5; KE: 5/5; FE: 5/5; FF: 5/5    Extremity Reflexes: Brisk and symmetric throughout.      Extremity Sensory: Sensation to pinprick and temperature symmetric. Proprioception intact.      Psych:  Mood and affect is appropriate      Assessment:    Marilee Dumas is a 22 y.o. year old female who is presenting with     Encounter Diagnoses   Name Primary?    Fibromyalgia     Chronic pain disorder        Plan:    1. Interventional: None at this time  S/p T3,4 5, diagnostic only MBB with only marginal relief for less than 24 hours    2. Pharmacologic: Flexeril 10 mg Po TID PRN.   -Continue Celebrex 100 mg BID for pain  -Discontinued Cymbalta secondary to flat affect and depressive episodes  -Increase  Elavil 10 mg q.h.s. to 25 mg QHS    Consulted with Dr. Glass to formulate care plan    3. Rehabilitative: Patient has already done PT and chiropractic care.     4. Diagnostic: Cervical and Lumbar MRI reviewed. Thoracic xray reviewed    5. Consults/referral: Referal to Rheumatology for re-evaluation, patient has appointment in September  Referral placed to  psych for coping/therapy/medication     5.  Follow up: 4 weeks    Ana Carbone PA-C  Interventional Pain medicine           Disclaimer:  This note may have been prepared using voice recognition software, it may have not been extensively proofed, as such there could be errors within the text such as sound alike errors.                               This service was not originating from a related E/M service provided within the previous 7 days nor will  to an E/M service or procedure within the next 24 hours or my soonest available appointment.  Prevailing standard of care was able to be met in this audio-only visit.

## 2022-07-28 ENCOUNTER — TELEPHONE (OUTPATIENT)
Dept: PAIN MEDICINE | Facility: CLINIC | Age: 22
End: 2022-07-28
Payer: MEDICAID

## 2022-07-28 NOTE — TELEPHONE ENCOUNTER
----- Message from Albertina Sharma sent at 7/28/2022  9:28 AM CDT -----  Contact: Marilee Burnette is requesting a call to nohemi mcfarland her audio appt to in clinic. Please call her back at [phone].          Thanks  DD

## 2022-08-10 ENCOUNTER — OFFICE VISIT (OUTPATIENT)
Dept: PAIN MEDICINE | Facility: CLINIC | Age: 22
End: 2022-08-10
Payer: MEDICAID

## 2022-08-10 VITALS
RESPIRATION RATE: 17 BRPM | WEIGHT: 111.56 LBS | SYSTOLIC BLOOD PRESSURE: 118 MMHG | HEIGHT: 65 IN | DIASTOLIC BLOOD PRESSURE: 70 MMHG | BODY MASS INDEX: 18.59 KG/M2 | HEART RATE: 116 BPM

## 2022-08-10 DIAGNOSIS — G89.4 CHRONIC PAIN DISORDER: Primary | ICD-10-CM

## 2022-08-10 PROCEDURE — 99214 OFFICE O/P EST MOD 30 MIN: CPT | Mod: S$PBB,,, | Performed by: PHYSICIAN ASSISTANT

## 2022-08-10 PROCEDURE — 99214 PR OFFICE/OUTPT VISIT, EST, LEVL IV, 30-39 MIN: ICD-10-PCS | Mod: S$PBB,,, | Performed by: PHYSICIAN ASSISTANT

## 2022-08-10 PROCEDURE — 3008F PR BODY MASS INDEX (BMI) DOCUMENTED: ICD-10-PCS | Mod: CPTII,,, | Performed by: PHYSICIAN ASSISTANT

## 2022-08-10 PROCEDURE — 3078F PR MOST RECENT DIASTOLIC BLOOD PRESSURE < 80 MM HG: ICD-10-PCS | Mod: CPTII,,, | Performed by: PHYSICIAN ASSISTANT

## 2022-08-10 PROCEDURE — 1159F MED LIST DOCD IN RCRD: CPT | Mod: CPTII,,, | Performed by: PHYSICIAN ASSISTANT

## 2022-08-10 PROCEDURE — 1160F RVW MEDS BY RX/DR IN RCRD: CPT | Mod: CPTII,,, | Performed by: PHYSICIAN ASSISTANT

## 2022-08-10 PROCEDURE — 3074F SYST BP LT 130 MM HG: CPT | Mod: CPTII,,, | Performed by: PHYSICIAN ASSISTANT

## 2022-08-10 PROCEDURE — 99999 PR PBB SHADOW E&M-EST. PATIENT-LVL III: CPT | Mod: PBBFAC,,, | Performed by: PHYSICIAN ASSISTANT

## 2022-08-10 PROCEDURE — 3074F PR MOST RECENT SYSTOLIC BLOOD PRESSURE < 130 MM HG: ICD-10-PCS | Mod: CPTII,,, | Performed by: PHYSICIAN ASSISTANT

## 2022-08-10 PROCEDURE — 1160F PR REVIEW ALL MEDS BY PRESCRIBER/CLIN PHARMACIST DOCUMENTED: ICD-10-PCS | Mod: CPTII,,, | Performed by: PHYSICIAN ASSISTANT

## 2022-08-10 PROCEDURE — 99999 PR PBB SHADOW E&M-EST. PATIENT-LVL III: ICD-10-PCS | Mod: PBBFAC,,, | Performed by: PHYSICIAN ASSISTANT

## 2022-08-10 PROCEDURE — 3078F DIAST BP <80 MM HG: CPT | Mod: CPTII,,, | Performed by: PHYSICIAN ASSISTANT

## 2022-08-10 PROCEDURE — 99213 OFFICE O/P EST LOW 20 MIN: CPT | Mod: PBBFAC | Performed by: PHYSICIAN ASSISTANT

## 2022-08-10 PROCEDURE — 3008F BODY MASS INDEX DOCD: CPT | Mod: CPTII,,, | Performed by: PHYSICIAN ASSISTANT

## 2022-08-10 PROCEDURE — 1159F PR MEDICATION LIST DOCUMENTED IN MEDICAL RECORD: ICD-10-PCS | Mod: CPTII,,, | Performed by: PHYSICIAN ASSISTANT

## 2022-08-10 RX ORDER — TIZANIDINE 4 MG/1
2-4 TABLET ORAL EVERY 8 HOURS
Qty: 90 TABLET | Refills: 2 | Status: SHIPPED | OUTPATIENT
Start: 2022-08-10 | End: 2022-08-18

## 2022-08-10 NOTE — PROGRESS NOTES
Chief Pain Complaint:  Thoracic Back Pain     Interval History (8/10/2022):  Marliee Dumas presents today for follow-up visit.  Patient was last seen on 7/13/2022. Patient reports pain as 10/10 today.  Reports that today her greatest pain is stemming from her neck. Reports that neck feels tight or that she needs to pop it. Reports at times the tension is so great that her head feels heavy. Reports Celebrex is marginally more helpful than Mobic, offering 2-3 hours of mild relief in pain. Reports increased Elavil at 25 mg is helping with sleep, but she still wakes up with pain. Also reports new onset intermittent numbness and tingling that begins in her fingertips and extends upward in a glove-like pattern. Also reports intermittent numbness of her left toe. Reports these episodes have occurred while sitting comfortably or riding in a car, denies any since of worry or feeling anxious at the times of these episodes or any compression of the areas affected.    Interval History (7/13/2022):  Marilee Dumas presents today for follow-up visit.  Patient was last seen on 6/29/2022. At that visit, the plan was to discontinue Cymbalta and Mobic and begin Celebrex. Patient reports pain as 10/10 today. Reports no change in pain with medication change. Reports depressed mood, but not having episode of flat affect as she did with Cymbalta. Reports feelings of hopelessness. Denies any suicidal or homicidal ideations, just frustrations about getting better. Reports this morning she experienced nausea and vomiting secondary to severe pain. Has follow up scheduled with rheumatology in 2 months.      Interval History (6/29/2022):  Marilee Dumas presents today for follow-up visit.  Patient was last seen on 6/17/2022. At that visit, the plan was to Increase Cymbalta to 40 mg once daily and add on Elavil 10 mg nightly. Reports Cymbalta offers some pain relief for 2-3 hours, but admits she has noticed increased instance of  depressive episodes or flat affect. Denies any suicidal ideation, but admits she gets more emotional at times. Reports night time Elavil has helped with sleep,though she continues to wake up with pain. Still pending follow up with rheumatology. Patient reports pain as 7/10 today.      Interval History (6/17/2022):  Marilee Dumas presents today for  telemed follow-up visit.  Patient was last seen on 6/1/2022. At that visit, the plan was to initiate Cymbalta 20 mg once daily to see if this helped with widespread pain.  Patient reports this medication did offer relief for about 2 hours.  Patient admits that some days she takes the medication twice a day once at 9:00 a.m. and 3:00 p.m. with relief.  Patient reports continued nighttime pain and difficulty sleeping secondary to pain. Patient reports pain as 8/10 today.    Interval History (6/1/2022): Marilee Dumas presents today for follow-up visit.  she underwent diagnostic bilateral T3-5 MBB on 5/19/22. The patient reports that she had soreness at the injection site immediately after and the next morning her pain was down to a 3/4 (50% relief) but after she got up the pain returned and was worse than before. Reports today the pain is greatest at the base of her cervical spine, mid thoracic spine, and low lumbar spine. Patient reports she has seen rheumatology before at age 17. Unsure of findings at the time, but believes it was consistent with fibromyalgia. Patient taking Flexerl and mobic daily without relief.  The changes lasted 4 weeks so far.  The changes have continued through this visit.  Patient reports pain as 8/10 today.      Interval History (5/9/2022):  Marilee Dumas presents today for follow-up visit for thoracic back pain.  Reports constant midback pain without radiation to anterior torso or extremities. Patient taking Flexerl and mobic daily without relief. Patient reports pain as 9/10 today.       History of Present Illness:   Marilee V  Piter is a 22 y.o. female  who is presenting with a chief complaint of Thoracic Back Pain. The patient began experiencing this problem insidiously, and the pain has been gradually worsening over the past 5 year(s). The pain is described as throbbing, cramping, aching and heavy and is located in the bilateral mid back T4-5. Pain is intermittent and lasts hours. The  pain is nonradiating. The patient rates her pain a 7 out of ten and interferes with activities of daily living a 7 out of ten. Pain is exacerbated by rotation of the thoracic spine, and is improved by rest. Patient reports prior trauma patient fell from a swing 5 years ago, no prior spinal surgery     - pertinent negatives: No fever, No chills, No weight loss, No bladder dysfunction, No bowel dysfunction, No saddle anesthesia  - pertinent positives: none    - medications, other therapies tried (physical therapy, injections):     >> NSAIDs, Tylenol, Tramadol, gabapentin, flexeril and medrol dose pack    >> Has previously undergone Physical Therapy    >> diagnostic bilateral T3-5 MBB on 5/19/22 with marginal relief      Imaging / Labs / Studies (reviewed on 8/10/2022):    X-ray thoracic spine 05/06/2022  FINDINGS:  There is mild levoscoliosis of the lower thoracic spine.  Vertebral body heights are well maintained.  No spondylolisthesis demonstrated.  Intervertebral disc heights are appear preserved.  No acute fractures or subluxations visualized.       Results for orders placed during the hospital encounter of 09/10/21    MRI Lumbar Spine Without Contrast    Narrative  EXAMINATION:  MRI LUMBAR SPINE WITHOUT CONTRAST    CLINICAL HISTORY:  Back pain or radiculopathy, > 6 wks; Dorsalgia, unspecified    TECHNIQUE:  Multiplanar, multisequence MR images were acquired from the thoracolumbar junction to the sacrum without the administration of contrast.    COMPARISON:  None.    FINDINGS:  Alignment: There is slight rightward rotation of multiple upper lumbar  vertebral bodies, likely related to mild thoracic scoliosis.    Vertebrae: Normal marrow signal. No fracture.    Discs: Normal height and signal.    Cord: Normal.  Conus terminates at L1    Degenerative findings:    No significant disc bulge, spinal canal stenosis, or neural foraminal narrowing.    Paraspinal muscles & soft tissues: Unremarkable.    Impression  As above.  No acute findings.      Electronically signed by: Jerzy Stevenson MD  Date:    09/10/2021  Time:    12:22        Results for orders placed during the hospital encounter of 09/10/21    MRI Cervical Spine Without Contrast    Narrative  EXAMINATION:  MRI CERVICAL SPINE WITHOUT CONTRAST    CLINICAL HISTORY:  Cervical radiculopathy;.  Radiculopathy, cervical region    TECHNIQUE:  Multiplanar, multisequence MR images of the cervical spine were acquired without the administration of contrast.    COMPARISON:  None.    FINDINGS:  Image quality is degraded by motion, lowering exam sensitivity and specificity.  Interpretation is offered within these confines.    Vertebral body heights and alignment are within normal limits. Intervertebral disc spaces are well preserved.  Marrow signal throughout the visualized osseous structures is within normal limits with no evidence of fracture or marrow replacement process.    Cervical cord is normal in signal and caliber.    Limited evaluation of the cervical soft tissues demonstrates no gross abnormality.    C2-3:   No spinal canal or neural foraminal stenosis.    C3-4:  No spinal canal or neural foraminal stenosis.    C4-5:  No spinal canal or neural foraminal stenosis.    C5-6:  No spinal canal or neural foraminal stenosis.    C6-7:  No spinal canal or neural foraminal stenosis.    C7-T1:  No spinal canal or neural foraminal stenosis.    Impression  Unremarkable MRI of the cervical spine.      Electronically signed by: Jerzy Stevenson MD  Date:    09/10/2021  Time:    12:09      Review of Systems:  CONSTITUTIONAL:  "patient denies any fever, chills, or weight loss  SKIN: patient denies any rash or itching  RESPIRATORY: patient denies having any shortness of breath  GASTROINTESTINAL: patient denies having any diarrhea, constipation, or bowel incontinence  GENITOURINARY: patient denies having any abnormal bladder function    MUSCULOSKELETAL:  - patient complains of the above noted pain/s (see chief pain complaint)    NEUROLOGICAL:   - pain as above  - strength in Upper extremities is intact, BILATERALLY  - sensation in Upper extremities is intact, BILATERALLY  - patient denies any loss of bowel or bladder control      PSYCHIATRIC: patient denies any change in mood    Other:  All other systems reviewed and are negative      Physical Exam:  /70   Pulse (!) 116   Resp 17   Ht 5' 5" (1.651 m)   Wt 50.6 kg (111 lb 8.8 oz)   BMI 18.56 kg/m²  (reviewed on 8/10/2022)  General: Alert and oriented, in no apparent distress.  Gait: normal gait.  Skin: No rashes, No discoloration, No obvious lesions  HEENT: Normocephalic, atraumatic. Pupils equal and round.  Cardiovascular: Regular rate and rhythm , no significant peripheral edema present  Respiratory: Without audible wheezing, without use of accessory muscles of respiration.    Musculoskeletal:    Cervical Spine    - Pain on flexion of cervical spine Present  - Spurling's Test:  Absent    - Pain on extension of cervical spine Present  - TTP over the cervical facet joints Present  -TTP along cervical paraspinals and bilateral traps  - Cervical facet loading Absent    Thoracic Spine    - Pain on flexion of Thoracic spine Present  - Pain on extension of Thoracic spine Equivocal  - TTP over the Thoracic facet joints Present T4-5   - Thoracic facet loading Equivocal      Lumbar Spine    - Pain on flexion of lumbar spine Present  - Straight Leg Raise:  Absent    - Pain on extension of lumbar spine Present  - TTP over the lumbar facet joints Present  - Lumbar facet loading " Absent    -Pain on palpation over the SI joint  Present  - ELVIS: Absent    Other:  Negative tinel's along bilateral wrists  2+ pulses  Cap refill < 2 sec    Neuro:    Strength:  UE R/L: D: 5/5; B: 5/5; T: 5/5; WF: 5/5; WE: 5/5; IO: 5/5;  LE R/L: HF: 5/5, HE: 5/5, KF: 5/5; KE: 5/5; FE: 5/5; FF: 5/5    Extremity Reflexes: Brisk and symmetric throughout.      Extremity Sensory: Sensation to pinprick and temperature symmetric. Proprioception intact.      Psych:  Mood and affect is appropriate      Assessment:    Marilee Dumas is a 22 y.o. year old female who is presenting with     Encounter Diagnosis   Name Primary?    Chronic pain disorder Yes       Plan:    1. Interventional: None at this time  S/p T3,4 5, diagnostic only MBB with only marginal relief for less than 24 hours    2. Pharmacologic: Discontinue Flexeril 10 mg Po TID PRN and switch to Tizanidine 4 mg 1/2 tab to one tab TID PRN  Patient understands this may cause drowsiness.    -Continue Celebrex 100 mg BID for pain  -Discontinued Cymbalta secondary to flat affect and depressive episodes  -Continue Elavil 25 mg QHS    3. Rehabilitative: Patient has already done PT and chiropractic care. Referral sent to physical therapy for dry needling of neck and traps    4. Diagnostic: Cervical and Lumbar MRI reviewed. Thoracic xray reviewed    5. Consults/referral: Referal to Rheumatology for re-evaluation, patient has appointment in September  Referral placed to psych for coping/therapy/medication     5.  Follow up: 4-6 weeks    Ana Carbone PA-C  Interventional Pain medicine           Disclaimer:  This note may have been prepared using voice recognition software, it may have not been extensively proofed, as such there could be errors within the text such as sound alike errors.                               This service was not originating from a related E/M service provided within the previous 7 days nor will  to an E/M service or procedure within  the next 24 hours or my soonest available appointment.  Prevailing standard of care was able to be met in this audio-only visit.

## 2022-08-18 DIAGNOSIS — G89.4 CHRONIC PAIN DISORDER: Primary | ICD-10-CM

## 2022-08-18 RX ORDER — BACLOFEN 10 MG/1
10 TABLET ORAL 2 TIMES DAILY
Qty: 60 TABLET | Refills: 3 | Status: SHIPPED | OUTPATIENT
Start: 2022-08-18 | End: 2022-10-12

## 2022-08-30 ENCOUNTER — TELEPHONE (OUTPATIENT)
Dept: PAIN MEDICINE | Facility: CLINIC | Age: 22
End: 2022-08-30
Payer: MEDICAID

## 2022-08-30 NOTE — TELEPHONE ENCOUNTER
----- Message from Tamara Hernandes sent at 8/30/2022 10:23 AM CDT -----  Contact: [phone]  Patient called, stated that she was told that insurance is not taken to the place where she refer it too, if is possible somewhere in [place] that takes her insurance. Thank you

## 2022-08-30 NOTE — TELEPHONE ENCOUNTER
Called patient and informed her that she would have to call her insurance company and see who is preferred on her plan then call us and we will send orders. Patient verbalized understanding.

## 2022-08-30 NOTE — TELEPHONE ENCOUNTER
----- Message from Mirna Kimble sent at 8/30/2022 12:26 PM CDT -----  Pt states she has found a physical therapist to take her insurance and she is needing the office to send a referral for her. Please call .[phone]

## 2022-08-30 NOTE — TELEPHONE ENCOUNTER
Called patient.  Patient gave a number for a PT in her home town.  Physical Therapy --AYAN Lees [phone].  Will call and get the company name and location.

## 2022-09-02 ENCOUNTER — PATIENT MESSAGE (OUTPATIENT)
Dept: PAIN MEDICINE | Facility: CLINIC | Age: 22
End: 2022-09-02
Payer: MEDICAID

## 2022-09-02 DIAGNOSIS — M43.06 SPONDYLOLYSIS OF LUMBAR REGION: Primary | ICD-10-CM

## 2022-09-02 DIAGNOSIS — M47.814 THORACIC SPONDYLOSIS: ICD-10-CM

## 2022-09-12 ENCOUNTER — PATIENT MESSAGE (OUTPATIENT)
Dept: PAIN MEDICINE | Facility: CLINIC | Age: 22
End: 2022-09-12
Payer: MEDICAID

## 2022-09-16 ENCOUNTER — OFFICE VISIT (OUTPATIENT)
Dept: RHEUMATOLOGY | Facility: CLINIC | Age: 22
End: 2022-09-16
Payer: MEDICAID

## 2022-09-16 ENCOUNTER — HOSPITAL ENCOUNTER (OUTPATIENT)
Dept: RADIOLOGY | Facility: HOSPITAL | Age: 22
Discharge: HOME | End: 2022-09-16
Attending: PHYSICIAN ASSISTANT
Payer: MEDICAID

## 2022-09-16 VITALS
HEIGHT: 65 IN | BODY MASS INDEX: 19.72 KG/M2 | DIASTOLIC BLOOD PRESSURE: 72 MMHG | SYSTOLIC BLOOD PRESSURE: 107 MMHG | WEIGHT: 118.38 LBS | HEART RATE: 105 BPM

## 2022-09-16 DIAGNOSIS — M53.3 SI (SACROILIAC) PAIN: ICD-10-CM

## 2022-09-16 DIAGNOSIS — M79.7 FIBROMYALGIA: Primary | ICD-10-CM

## 2022-09-16 PROCEDURE — 3078F DIAST BP <80 MM HG: CPT | Mod: CPTII,,, | Performed by: PHYSICIAN ASSISTANT

## 2022-09-16 PROCEDURE — 99999 PR PBB SHADOW E&M-EST. PATIENT-LVL III: ICD-10-PCS | Mod: PBBFAC,,, | Performed by: PHYSICIAN ASSISTANT

## 2022-09-16 PROCEDURE — 99999 PR PBB SHADOW E&M-EST. PATIENT-LVL III: CPT | Mod: PBBFAC,,, | Performed by: PHYSICIAN ASSISTANT

## 2022-09-16 PROCEDURE — 1159F PR MEDICATION LIST DOCUMENTED IN MEDICAL RECORD: ICD-10-PCS | Mod: CPTII,,, | Performed by: PHYSICIAN ASSISTANT

## 2022-09-16 PROCEDURE — 72202 X-RAY EXAM SI JOINTS 3/> VWS: CPT | Mod: 26,,, | Performed by: RADIOLOGY

## 2022-09-16 PROCEDURE — 3078F PR MOST RECENT DIASTOLIC BLOOD PRESSURE < 80 MM HG: ICD-10-PCS | Mod: CPTII,,, | Performed by: PHYSICIAN ASSISTANT

## 2022-09-16 PROCEDURE — 1159F MED LIST DOCD IN RCRD: CPT | Mod: CPTII,,, | Performed by: PHYSICIAN ASSISTANT

## 2022-09-16 PROCEDURE — 3074F PR MOST RECENT SYSTOLIC BLOOD PRESSURE < 130 MM HG: ICD-10-PCS | Mod: CPTII,,, | Performed by: PHYSICIAN ASSISTANT

## 2022-09-16 PROCEDURE — 3008F PR BODY MASS INDEX (BMI) DOCUMENTED: ICD-10-PCS | Mod: CPTII,,, | Performed by: PHYSICIAN ASSISTANT

## 2022-09-16 PROCEDURE — 99204 PR OFFICE/OUTPT VISIT, NEW, LEVL IV, 45-59 MIN: ICD-10-PCS | Mod: S$PBB,,, | Performed by: PHYSICIAN ASSISTANT

## 2022-09-16 PROCEDURE — 3008F BODY MASS INDEX DOCD: CPT | Mod: CPTII,,, | Performed by: PHYSICIAN ASSISTANT

## 2022-09-16 PROCEDURE — 72202 XR SACROILIAC JOINTS COMPLETE: ICD-10-PCS | Mod: 26,,, | Performed by: RADIOLOGY

## 2022-09-16 PROCEDURE — 72202 X-RAY EXAM SI JOINTS 3/> VWS: CPT | Mod: TC

## 2022-09-16 PROCEDURE — 3074F SYST BP LT 130 MM HG: CPT | Mod: CPTII,,, | Performed by: PHYSICIAN ASSISTANT

## 2022-09-16 PROCEDURE — 99213 OFFICE O/P EST LOW 20 MIN: CPT | Mod: PBBFAC | Performed by: PHYSICIAN ASSISTANT

## 2022-09-16 PROCEDURE — 99204 OFFICE O/P NEW MOD 45 MIN: CPT | Mod: S$PBB,,, | Performed by: PHYSICIAN ASSISTANT

## 2022-09-16 RX ORDER — GABAPENTIN 300 MG/1
CAPSULE ORAL
Qty: 53 CAPSULE | Refills: 0 | Status: SHIPPED | OUTPATIENT
Start: 2022-09-16 | End: 2022-10-13 | Stop reason: SDUPTHER

## 2022-09-16 NOTE — PROGRESS NOTES
Subjective:      Patient ID: Marilee Dumas is a 22 y.o. female.    Chief Complaint: Fibromyalgia      HPI   Marilee Dumas  is a 22 y.o. female who is a new patient to the clinic referred by interventional pain management for fibromyalgia.  She has undergone physical therapy as well as multiple medications.  She has had injections and interventional procedures for symptomatic management.  Nothing seems to help her pain.  She has failed tizanidine, Cymbalta, Celebrex, injections, physical therapy.  Currently on Elavil 25 mg q.h.s. which does help her sleep.  She takes Flexeril p.r.n. as well.    Pain is concentrated in the upper back.  It has been going on for about 5 years now.  It started getting a lot worse about 2 years ago.  It is interfering with her functional level.  She denies radiating symptoms.  She has minimal pain in the lower back.  She does have some SI joint pain but the vast majority of her pain is in the upper thoracic region.  Pain follows both inflammatory and non inflammatory pattern.    Patient denies fevers, chills, photosensitivity, eye pain, shortness of breath, chest pain, hematuria, blood in the stool, rash, sicca symptoms, raynauds, finger ulcerations.  Rheumatologic systems otherwise negative.    Serologies/Labs:  Neg AMALIA  Current Treatment:  Elavil 25 mg q.h.s.  Flexeril  Previous Treatment:   Tizanidine  Cymbalta  Celebrex  Interventional injections by pain management  Physical therapy      Current Outpatient Medications:     amitriptyline (ELAVIL) 25 MG tablet, Take 1 tablet (25 mg total) by mouth nightly as needed for Insomnia., Disp: 30 tablet, Rfl: 1    baclofen (LIORESAL) 10 MG tablet, Take 1 tablet (10 mg total) by mouth 2 (two) times daily., Disp: 60 tablet, Rfl: 3    cyclobenzaprine (FLEXERIL) 10 MG tablet, Take 1 tablet (10 mg total) by mouth 3 (three) times daily as needed for Muscle spasms., Disp: 60 tablet, Rfl: 2    ondansetron (ZOFRAN-ODT) 4 MG TbDL, Take 1 tablet  "(4 mg total) by mouth every 6 (six) hours as needed., Disp: 15 tablet, Rfl: 0    sumatriptan (IMITREX) 50 MG tablet, Take at onset of migrane/headache.  If headache still remains, take a second dose.  Max 2 tablets per 24 hours, Disp: 12 tablet, Rfl: 1    albuterol (PROVENTIL/VENTOLIN HFA) 90 mcg/actuation inhaler, Inhale 1-2 puffs into the lungs every 6 (six) hours as needed for Wheezing., Disp: 8 g, Rfl: 0    gabapentin (NEURONTIN) 300 MG capsule, Take 1 capsule (300 mg total) by mouth Daily for 7 days, THEN 1 capsule (300 mg total) 2 (two) times daily for 23 days. Do not stop abruptly.., Disp: 53 capsule, Rfl: 0  No current facility-administered medications for this visit.    Facility-Administered Medications Ordered in Other Visits:     ondansetron injection 4 mg, 4 mg, Intravenous, Once PRN, Roland Glass MD    Past Medical History:   Diagnosis Date    Neck pain      No family history on file.  Social History     Socioeconomic History    Marital status: Single   Tobacco Use    Smoking status: Every Day     Types: Cigarettes    Smokeless tobacco: Former   Substance and Sexual Activity    Alcohol use: Never    Drug use: Yes     Types: Marijuana    Sexual activity: Yes     Partners: Female     Review of patient's allergies indicates:   Allergen Reactions    Penicillins        Objective:   /72   Pulse 105   Ht 5' 5" (1.651 m)   Wt 53.7 kg (118 lb 6.2 oz)   BMI 19.70 kg/m²   Immunization History   Administered Date(s) Administered    DTaP 2002, 2002, 2002, 06/12/2003, 11/21/2006    HIB 2002, 06/12/2003    Hep B / HiB 2002, 2002    Hepatitis A, Pediatric/Adolescent, 2 Dose 06/10/2015, 07/12/2016    Hepatitis B, Pediatric/Adolescent 2002    IPV 2002, 2002, 2002, 11/21/2006    Influenza 12/02/2011    MMR 06/12/2003    MMRV 11/21/2006    Meningococcal Conjugate (MCV4P) 09/12/2013, 06/04/2018    Pneumococcal Conjugate - 7 Valent 2002, " 2002, 06/12/2003    Tdap 09/12/2013    Varicella 08/14/2009       Physical Exam   Constitutional: She is oriented to person, place, and time. No distress.   HENT:   Head: Normocephalic and atraumatic.   Pulmonary/Chest: Effort normal.   Abdominal: She exhibits no distension.   Musculoskeletal:         General: No swelling or tenderness. Normal range of motion.      Cervical back: Normal range of motion.      Right knee: No effusion.      Left knee: No effusion.   Lymphadenopathy:     She has no cervical adenopathy.   Neurological: She is alert and oriented to person, place, and time.   Skin: Skin is warm.   Psychiatric: Mood normal.   Nursing note and vitals reviewed.      Right Side Rheumatological Exam     Examination finds the 1st PIP, 1st MCP, 2nd PIP, 2nd MCP, 3rd PIP, 3rd MCP, 4th PIP, 4th MCP, 5th PIP and 5th MCP normal.    Shoulder Exam   Tenderness Location: no tenderness    Range of Motion   The patient has normal right shoulder ROM.    Knee Exam   Patellofemoral Crepitus: negative  Effusion: negative  Warmth: negative    Elbow/Wrist Exam   Tenderness Location: no elbow tenderness and no wrist tenderness    Range of Motion   Elbow   The patient has normal right elbow ROM.    Range of Motion   Wrist   The patient has normal right wrist ROM.    Left Side Rheumatological Exam     Examination finds the 1st PIP, 1st MCP, 2nd PIP, 2nd MCP, 3rd PIP, 3rd MCP, 4th PIP, 4th MCP, 5th PIP and 5th MCP normal.    Shoulder Exam   Tenderness Location: no tenderness    Range of Motion   The patient has normal left shoulder ROM.    Knee Exam     Patellofemoral Crepitus: negative  Effusion: negative  Warmth: negative    Elbow/Wrist Exam   Tenderness Location: no elbow tenderness and no wrist tenderness    Range of Motion   Elbow   The patient has normal left elbow ROM.    Range of Motion   Wrist   The patient has normal left wrist ROM.    4/18 traditional FM trigger pionts (Bilat SC jts, bilat traps)    Recent Results  (from the past 672 hour(s))   CBC Auto Differential    Collection Time: 09/16/22  9:58 AM   Result Value Ref Range    WBC 6.24 3.90 - 12.70 K/uL    RBC 4.10 4.00 - 5.40 M/uL    Hemoglobin 12.6 12.0 - 16.0 g/dL    Hematocrit 37.5 37.0 - 48.5 %    MCV 92 82 - 98 fL    MCH 30.7 27.0 - 31.0 pg    MCHC 33.6 32.0 - 36.0 g/dL    RDW 11.8 11.5 - 14.5 %    Platelets 240 150 - 450 K/uL    MPV 8.7 (L) 9.2 - 12.9 fL    Immature Granulocytes 0.3 0.0 - 0.5 %    Gran # (ANC) 3.9 1.8 - 7.7 K/uL    Immature Grans (Abs) 0.02 0.00 - 0.04 K/uL    Lymph # 1.8 1.0 - 4.8 K/uL    Mono # 0.4 0.3 - 1.0 K/uL    Eos # 0.1 0.0 - 0.5 K/uL    Baso # 0.03 0.00 - 0.20 K/uL    nRBC 0 0 /100 WBC    Gran % 62.9 38.0 - 73.0 %    Lymph % 28.8 18.0 - 48.0 %    Mono % 6.7 4.0 - 15.0 %    Eosinophil % 0.8 0.0 - 8.0 %    Basophil % 0.5 0.0 - 1.9 %    Differential Method Automated    Comprehensive Metabolic Panel    Collection Time: 09/16/22  9:58 AM   Result Value Ref Range    Sodium 137 136 - 145 mmol/L    Potassium 4.5 3.5 - 5.1 mmol/L    Chloride 103 95 - 110 mmol/L    CO2 28 23 - 29 mmol/L    Glucose 93 70 - 110 mg/dL    BUN 12 6 - 20 mg/dL    Creatinine 0.7 0.5 - 1.4 mg/dL    Calcium 9.6 8.7 - 10.5 mg/dL    Total Protein 7.0 6.0 - 8.4 g/dL    Albumin 4.2 3.5 - 5.2 g/dL    Total Bilirubin 0.5 0.1 - 1.0 mg/dL    Alkaline Phosphatase 55 55 - 135 U/L    AST 18 10 - 40 U/L    ALT 15 10 - 44 U/L    Anion Gap 6 (L) 8 - 16 mmol/L    eGFR >60 >60 mL/min/1.73 m^2   C-Reactive Protein    Collection Time: 09/16/22  9:58 AM   Result Value Ref Range    CRP 0.8 0.0 - 8.2 mg/L       No results found for: TBGOLDPLUS   No results found for: HAV, HEPAIGM, HEPBIGM, HEPBCAB, HBEAG, HEPCAB     Imaging  I have personally reviewed images and reports as below.  I agree with the interpretation.  X-Ray Sacroiliac Joints Complete  Narrative: EXAMINATION:  XR SACROILIAC JOINTS COMPLETE    CLINICAL HISTORY:  Sacrococcygeal disorders, not elsewhere  classified    TECHNIQUE:  AP/oblique views    COMPARISON:  None    FINDINGS:  SI joints are symmetric without sclerosis, erosion, widening or ankylosis.  Impression: As above    Electronically signed by: Claude York MD  Date:    09/16/2022  Time:    10:22  Next appt: 09/21/2022 at 01:20 PM in Pain Medicine (Ana Carboen PA-C)     Dx: Chronic bilateral thoracic back pain     0 Result Notes  Details    Reading Physician Reading Date Result Priority   Jerzy Stevenson MD  [phone] 5/6/2022 Routine     Narrative & Impression  EXAMINATION:  XR THORACIC SPINE 4 OR MORE VIEWS     CLINICAL HISTORY:  Pain in thoracic spine     TECHNIQUE:  AP, lateral, and bilateral oblique views of the thoracic spine were obtained.     COMPARISON:  None     FINDINGS:  There is mild levoscoliosis of the lower thoracic spine.  Vertebral body heights are well maintained.  No spondylolisthesis demonstrated.  Intervertebral disc heights are appear preserved.  No acute fractures or subluxations visualized.     Impression:     As above.        Electronically signed by: Jerzy Stevenson MD  Date:                                            05/06/2022  Time:                                           14:24     MRI Lumbar Spine Without Contrast  Order: [id number]  Status: Final result     Visible to patient: Yes (seen)     Next appt: 09/21/2022 at 01:20 PM in Pain Medicine (Ana Carbone PA-C)     Dx: Dorsalgia, unspecified; Scoliosis, un...     0 Result Notes  Details    Reading Physician Reading Date Result Priority   Jerzy Stevenson MD  [phone] 9/10/2021 Routine     Narrative & Impression  EXAMINATION:  MRI LUMBAR SPINE WITHOUT CONTRAST     CLINICAL HISTORY:  Back pain or radiculopathy, > 6 wks; Dorsalgia, unspecified     TECHNIQUE:  Multiplanar, multisequence MR images were acquired from the thoracolumbar junction to the sacrum without the administration of contrast.     COMPARISON:  None.      FINDINGS:  Alignment: There is slight rightward rotation of multiple upper lumbar vertebral bodies, likely related to mild thoracic scoliosis.     Vertebrae: Normal marrow signal. No fracture.     Discs: Normal height and signal.     Cord: Normal.  Conus terminates at L1     Degenerative findings:     No significant disc bulge, spinal canal stenosis, or neural foraminal narrowing.     Paraspinal muscles & soft tissues: Unremarkable.     Impression:     As above.  No acute findings.        Electronically signed by: Jerzy Stevenson MD  Date:                                            09/10/2021  Time:                                           12:22     MRI Cervical Spine Without Contrast  Order: [id number]  Status: Final result     Visible to patient: Yes (seen)     Next appt: 09/21/2022 at 01:20 PM in Pain Medicine (Ana Carbone PA-C)     Dx: Radiculopathy, cervical region; Scoli...     0 Result Notes  Details    Reading Physician Reading Date Result Priority   Jerzy Stevenson MD  [phone] 9/10/2021 Routine     Narrative & Impression  EXAMINATION:  MRI CERVICAL SPINE WITHOUT CONTRAST     CLINICAL HISTORY:  Cervical radiculopathy;.  Radiculopathy, cervical region     TECHNIQUE:  Multiplanar, multisequence MR images of the cervical spine were acquired without the administration of contrast.     COMPARISON:  None.     FINDINGS:  Image quality is degraded by motion, lowering exam sensitivity and specificity.  Interpretation is offered within these confines.     Vertebral body heights and alignment are within normal limits. Intervertebral disc spaces are well preserved.  Marrow signal throughout the visualized osseous structures is within normal limits with no evidence of fracture or marrow replacement process.     Cervical cord is normal in signal and caliber.     Limited evaluation of the cervical soft tissues demonstrates no gross abnormality.     C2-3:   No spinal canal or neural foraminal  stenosis.     C3-4:  No spinal canal or neural foraminal stenosis.     C4-5:  No spinal canal or neural foraminal stenosis.     C5-6:  No spinal canal or neural foraminal stenosis.     C6-7:  No spinal canal or neural foraminal stenosis.     C7-T1:  No spinal canal or neural foraminal stenosis.     Impression:     Unremarkable MRI of the cervical spine.        Electronically signed by: Jerzy Stevenson MD  Date:                                            09/10/2021  Time:                                           12:09     Assessment:     1. Fibromyalgia    2. SI (sacroiliac) pain              Plan:     Marilee was seen today for fibromyalgia.    Diagnoses and all orders for this visit:    Fibromyalgia  -     Ambulatory referral/consult to Rheumatology  -     CBC Auto Differential; Standing  -     Comprehensive Metabolic Panel; Standing  -     Sedimentation rate; Standing  -     C-Reactive Protein; Standing  -     HLA B27 Antigen; Future  -     gabapentin (NEURONTIN) 300 MG capsule; Take 1 capsule (300 mg total) by mouth Daily for 7 days, THEN 1 capsule (300 mg total) 2 (two) times daily for 23 days. Do not stop abruptly..    SI (sacroiliac) pain  -     CBC Auto Differential; Standing  -     Comprehensive Metabolic Panel; Standing  -     Sedimentation rate; Standing  -     C-Reactive Protein; Standing  -     HLA B27 Antigen; Future  -     X-Ray Sacroiliac Joints Complete; Future      Back pain  Mainly upper back  TTP myofascial region but historical trigger point for FM largely non tender  Some SI jt pain  Xray SI jts - no erosions  Consider MRI T spine/SI jts in future  Reg 4 and HLA B27 today  Gabapentin for Myofascial pain  Start w 300mg qhs x 1 week Then move to 300mg bid  If 300mg in am is too sedating, will go to 100mg in am  Return to clinic: 4-6 weeks    Follow up in about 5 weeks (around 10/21/2022).    The patient understands, chooses and consents to this plan and accepts all   the risks which include but  are not limited to the risks mentioned above.     Disclaimer: This note was prepared using a voice recognition system and is likely to have sound alike errors within the text.

## 2022-09-19 ENCOUNTER — PATIENT MESSAGE (OUTPATIENT)
Dept: RHEUMATOLOGY | Facility: CLINIC | Age: 22
End: 2022-09-19
Payer: MEDICAID

## 2022-09-21 ENCOUNTER — OFFICE VISIT (OUTPATIENT)
Dept: PAIN MEDICINE | Facility: CLINIC | Age: 22
End: 2022-09-21
Payer: MEDICAID

## 2022-09-21 DIAGNOSIS — G89.4 CHRONIC PAIN DISORDER: ICD-10-CM

## 2022-09-21 DIAGNOSIS — M50.30 DDD (DEGENERATIVE DISC DISEASE), CERVICAL: Primary | ICD-10-CM

## 2022-09-21 PROCEDURE — 99214 PR OFFICE/OUTPT VISIT, EST, LEVL IV, 30-39 MIN: ICD-10-PCS | Mod: 95,,, | Performed by: PHYSICIAN ASSISTANT

## 2022-09-21 PROCEDURE — 1160F PR REVIEW ALL MEDS BY PRESCRIBER/CLIN PHARMACIST DOCUMENTED: ICD-10-PCS | Mod: CPTII,95,, | Performed by: PHYSICIAN ASSISTANT

## 2022-09-21 PROCEDURE — 1159F PR MEDICATION LIST DOCUMENTED IN MEDICAL RECORD: ICD-10-PCS | Mod: CPTII,95,, | Performed by: PHYSICIAN ASSISTANT

## 2022-09-21 PROCEDURE — 1160F RVW MEDS BY RX/DR IN RCRD: CPT | Mod: CPTII,95,, | Performed by: PHYSICIAN ASSISTANT

## 2022-09-21 PROCEDURE — 1159F MED LIST DOCD IN RCRD: CPT | Mod: CPTII,95,, | Performed by: PHYSICIAN ASSISTANT

## 2022-09-21 PROCEDURE — 99214 OFFICE O/P EST MOD 30 MIN: CPT | Mod: 95,,, | Performed by: PHYSICIAN ASSISTANT

## 2022-09-21 NOTE — PROGRESS NOTES
Chief Pain Complaint:  Thoracic Back Pain     The patient location is: home  The chief complaint leading to consultation is: chronic pain     Visit type: audiovisual    Face to Face time with patient: 10-15 minutes  20 minutes of total time spent on the encounter, which includes face to face time and non-face to face time preparing to see the patient (eg, review of tests), Obtaining and/or reviewing separately obtained history, Documenting clinical information in the electronic or other health record, Independently interpreting results (not separately reported) and communicating results to the patient/family/caregiver, or Care coordination (not separately reported).     Each patient to whom he or she provides medical services by telemedicine is:  (1) informed of the relationship between the physician and patient and the respective role of any other health care provider with respect to management of the patient; and (2) notified that he or she may decline to receive medical services by telemedicine and may withdraw from such care at any time.    Interval History (9/21/2022):  Marilee Dumas presents today via telemedicine for follow-up visit.  Patient was last seen on 8/10/2022. At that visit, the plan was to continue Elavil and and follow up with Rheumatology for further evaluation. Patient reports pain as 7/10 today. She reports her pain is causing her to be more depressed. She denies any suicidal ideation, but the pain makes it difficult for her to imagine what the future may be like      Interval History (8/10/2022):  Marilee Dumas presents today for follow-up visit.  Patient was last seen on 7/13/2022. Patient reports pain as 10/10 today.  Reports that today her greatest pain is stemming from her neck. Reports that neck feels tight or that she needs to pop it. Reports at times the tension is so great that her head feels heavy. Reports Celebrex is marginally more helpful than Mobic, offering 2-3 hours  of mild relief in pain. Reports increased Elavil at 25 mg is helping with sleep, but she still wakes up with pain. Also reports new onset intermittent numbness and tingling that begins in her fingertips and extends upward in a glove-like pattern. Also reports intermittent numbness of her left toe. Reports these episodes have occurred while sitting comfortably or riding in a car, denies any since of worry or feeling anxious at the times of these episodes or any compression of the areas affected.    Interval History (7/13/2022):  Marilee KRYSTYNA Piter presents today for follow-up visit.  Patient was last seen on 6/29/2022. At that visit, the plan was to discontinue Cymbalta and Mobic and begin Celebrex. Patient reports pain as 10/10 today. Reports no change in pain with medication change. Reports depressed mood, but not having episode of flat affect as she did with Cymbalta. Reports feelings of hopelessness. Denies any suicidal or homicidal ideations, just frustrations about getting better. Reports this morning she experienced nausea and vomiting secondary to severe pain. Has follow up scheduled with rheumatology in 2 months.      Interval History (6/29/2022):  Marilee KRYSTYNA Piter presents today for follow-up visit.  Patient was last seen on 6/17/2022. At that visit, the plan was to Increase Cymbalta to 40 mg once daily and add on Elavil 10 mg nightly. Reports Cymbalta offers some pain relief for 2-3 hours, but admits she has noticed increased instance of depressive episodes or flat affect. Denies any suicidal ideation, but admits she gets more emotional at times. Reports night time Elavil has helped with sleep,though she continues to wake up with pain. Still pending follow up with rheumatology. Patient reports pain as 7/10 today.      Interval History (6/17/2022):  Marilee Dumas presents today for  telemed follow-up visit.  Patient was last seen on 6/1/2022. At that visit, the plan was to initiate Cymbalta 20 mg  once daily to see if this helped with widespread pain.  Patient reports this medication did offer relief for about 2 hours.  Patient admits that some days she takes the medication twice a day once at 9:00 a.m. and 3:00 p.m. with relief.  Patient reports continued nighttime pain and difficulty sleeping secondary to pain. Patient reports pain as 8/10 today.    Interval History (6/1/2022): Marilee Duams presents today for follow-up visit.  she underwent diagnostic bilateral T3-5 MBB on 5/19/22. The patient reports that she had soreness at the injection site immediately after and the next morning her pain was down to a 3/4 (50% relief) but after she got up the pain returned and was worse than before. Reports today the pain is greatest at the base of her cervical spine, mid thoracic spine, and low lumbar spine. Patient reports she has seen rheumatology before at age 17. Unsure of findings at the time, but believes it was consistent with fibromyalgia. Patient taking Flexerl and mobic daily without relief.  The changes lasted 4 weeks so far.  The changes have continued through this visit.  Patient reports pain as 8/10 today.      Interval History (5/9/2022):  Marilee Dumas presents today for follow-up visit for thoracic back pain.  Reports constant midback pain without radiation to anterior torso or extremities. Patient taking Flexerl and mobic daily without relief. Patient reports pain as 9/10 today.       History of Present Illness:   Marilee Dumas is a 22 y.o. female  who is presenting with a chief complaint of Thoracic Back Pain. The patient began experiencing this problem insidiously, and the pain has been gradually worsening over the past 5 year(s). The pain is described as throbbing, cramping, aching and heavy and is located in the bilateral mid back T4-5 . Pain is intermittent and lasts hours. The  pain is nonradiating. The patient rates her pain a 7 out of ten and interferes with activities of daily  living a 7 out of ten. Pain is exacerbated by rotation of the thoracic spine, and is improved by rest. Patient reports prior trauma patient fell from a swing 5 years ago, no prior spinal surgery     - pertinent negatives: No fever, No chills, No weight loss, No bladder dysfunction, No bowel dysfunction, No saddle anesthesia  - pertinent positives: none    - medications, other therapies tried (physical therapy, injections):     >> NSAIDs, Tylenol, Tramadol, gabapentin, flexeril and medrol dose pack    >> Has previously undergone Physical Therapy    >> diagnostic bilateral T3-5 MBB on 5/19/22 with marginal relief      Imaging / Labs / Studies (reviewed on 9/21/2022):    X-ray thoracic spine 05/06/2022  FINDINGS:  There is mild levoscoliosis of the lower thoracic spine.  Vertebral body heights are well maintained.  No spondylolisthesis demonstrated.  Intervertebral disc heights are appear preserved.  No acute fractures or subluxations visualized.       Results for orders placed during the hospital encounter of 09/10/21    MRI Lumbar Spine Without Contrast    Narrative  EXAMINATION:  MRI LUMBAR SPINE WITHOUT CONTRAST    CLINICAL HISTORY:  Back pain or radiculopathy, > 6 wks; Dorsalgia, unspecified    TECHNIQUE:  Multiplanar, multisequence MR images were acquired from the thoracolumbar junction to the sacrum without the administration of contrast.    COMPARISON:  None.    FINDINGS:  Alignment: There is slight rightward rotation of multiple upper lumbar vertebral bodies, likely related to mild thoracic scoliosis.    Vertebrae: Normal marrow signal. No fracture.    Discs: Normal height and signal.    Cord: Normal.  Conus terminates at L1    Degenerative findings:    No significant disc bulge, spinal canal stenosis, or neural foraminal narrowing.    Paraspinal muscles & soft tissues: Unremarkable.    Impression  As above.  No acute findings.      Electronically signed by: Jerzy Stevenson  MD  Date:    09/10/2021  Time:    12:22        Results for orders placed during the hospital encounter of 09/10/21    MRI Cervical Spine Without Contrast    Narrative  EXAMINATION:  MRI CERVICAL SPINE WITHOUT CONTRAST    CLINICAL HISTORY:  Cervical radiculopathy;.  Radiculopathy, cervical region    TECHNIQUE:  Multiplanar, multisequence MR images of the cervical spine were acquired without the administration of contrast.    COMPARISON:  None.    FINDINGS:  Image quality is degraded by motion, lowering exam sensitivity and specificity.  Interpretation is offered within these confines.    Vertebral body heights and alignment are within normal limits. Intervertebral disc spaces are well preserved.  Marrow signal throughout the visualized osseous structures is within normal limits with no evidence of fracture or marrow replacement process.    Cervical cord is normal in signal and caliber.    Limited evaluation of the cervical soft tissues demonstrates no gross abnormality.    C2-3:   No spinal canal or neural foraminal stenosis.    C3-4:  No spinal canal or neural foraminal stenosis.    C4-5:  No spinal canal or neural foraminal stenosis.    C5-6:  No spinal canal or neural foraminal stenosis.    C6-7:  No spinal canal or neural foraminal stenosis.    C7-T1:  No spinal canal or neural foraminal stenosis.    Impression  Unremarkable MRI of the cervical spine.      Electronically signed by: Jerzy Stevenson MD  Date:    09/10/2021  Time:    12:09      Review of Systems:  CONSTITUTIONAL: patient denies any fever, chills, or weight loss  SKIN: patient denies any rash or itching  RESPIRATORY: patient denies having any shortness of breath  GASTROINTESTINAL: patient denies having any diarrhea, constipation, or bowel incontinence  GENITOURINARY: patient denies having any abnormal bladder function    MUSCULOSKELETAL:  - patient complains of the above noted pain/s (see chief pain complaint)    NEUROLOGICAL:   - pain as above  -  strength in Upper extremities is intact, BILATERALLY  - sensation in Upper extremities is intact, BILATERALLY  - patient denies any loss of bowel or bladder control      PSYCHIATRIC: patient denies any change in mood    Other:  All other systems reviewed and are negative      Physical Exam:  Telemedicine Exam  There were no vitals filed for this visit.  There is no height or weight on file to calculate BMI.   (reviewed on 10/12/2022)     GENERAL: Well appearing, in no acute distress, alert and oriented x3.  Cooperative.  PSYCH:  Mood and affect appropriate.  SKIN: Skin color & texture with no obvious abnormalities.    HEAD/FACE:  Normocephalic, atraumatic.    PULM:  No difficulty breathing. No nasal flaring. No obvious wheezing.  EXTREMITIES: No obvious deformities. Moving all extremities well, appears to have symmetric strength throughout.  MUSCULOSKELETAL: No obvious atrophy abnormalities are noted.   NEURO: No obvious neurologic deficit.   GAIT: sitting.     Physical Exam: last in clinic visit:      There were no vitals taken for this visit. (reviewed on 9/21/2022)  General: Alert and oriented, in no apparent distress.  Gait: normal gait.  Skin: No rashes, No discoloration, No obvious lesions  HEENT: Normocephalic, atraumatic. Pupils equal and round.  Cardiovascular: Regular rate and rhythm , no significant peripheral edema present  Respiratory: Without audible wheezing, without use of accessory muscles of respiration.    Musculoskeletal:    Cervical Spine    - Pain on flexion of cervical spine Present  - Spurling's Test:  Absent    - Pain on extension of cervical spine Present  - TTP over the cervical facet joints Present  -TTP along cervical paraspinals and bilateral traps  - Cervical facet loading Absent    Thoracic Spine    - Pain on flexion of Thoracic spine Present  - Pain on extension of Thoracic spine Equivocal  - TTP over the Thoracic facet joints Present T4-5   - Thoracic facet loading  Equivocal      Lumbar Spine    - Pain on flexion of lumbar spine Present  - Straight Leg Raise:  Absent    - Pain on extension of lumbar spine Present  - TTP over the lumbar facet joints Present  - Lumbar facet loading Absent    -Pain on palpation over the SI joint  Present  - ELVIS: Absent    Other:  Negative tinel's along bilateral wrists  2+ pulses  Cap refill < 2 sec    Neuro:    Strength:  UE R/L: D: 5/5; B: 5/5; T: 5/5; WF: 5/5; WE: 5/5; IO: 5/5;  LE R/L: HF: 5/5, HE: 5/5, KF: 5/5; KE: 5/5; FE: 5/5; FF: 5/5    Extremity Reflexes: Brisk and symmetric throughout.      Extremity Sensory: Sensation to pinprick and temperature symmetric. Proprioception intact.      Psych:  Mood and affect is appropriate      Assessment:    Marilee Dumas is a 22 y.o. year old female who is presenting with     No diagnosis found.      Plan:    1. Interventional: None at this time  S/p T3,4 5, diagnostic only MBB with only marginal relief for less than 24 hours    2. Pharmacologic: Discontinue Flexeril 10 mg Po TID PRN and switch to Tizanidine 4 mg 1/2 tab to one tab TID PRN  Patient understands this may cause drowsiness.    -Discontinued Celebrex 100 mg BID for pain  -Discontinued Cymbalta secondary to flat affect and depressive episodes  -Continue Elavil 25 mg QHS    3. Rehabilitative: Patient has already done PT and chiropractic care. Referral sent to physical therapy for dry needling of neck and traps    4. Diagnostic: Cervical and Lumbar MRI reviewed. Thoracic xray reviewed    5. Consults/referral: Referal to Rheumatology for re-evaluation, patient has appointment in September  Referral placed to psych for coping/therapy/medication   [facility]  [address] [place] [address]  Phone [phone]      5.  Follow up: 4-6 weeks with Dr. Maria Luisa Carbone PA-C  Interventional Pain medicine           Disclaimer:  This note may have been prepared using voice  recognition software, it may have not been extensively proofed, as such there could be errors within the text such as sound alike errors.                               This service was not originating from a related E/M service provided within the previous 7 days nor will  to an E/M service or procedure within the next 24 hours or my soonest available appointment.  Prevailing standard of care was able to be met in this audio-only visit.

## 2022-09-28 ENCOUNTER — PATIENT MESSAGE (OUTPATIENT)
Dept: PAIN MEDICINE | Facility: CLINIC | Age: 22
End: 2022-09-28
Payer: MEDICAID

## 2022-09-30 ENCOUNTER — PATIENT MESSAGE (OUTPATIENT)
Dept: PAIN MEDICINE | Facility: CLINIC | Age: 22
End: 2022-09-30
Payer: MEDICAID

## 2022-10-13 DIAGNOSIS — M79.7 FIBROMYALGIA: ICD-10-CM

## 2022-10-13 NOTE — TELEPHONE ENCOUNTER
----- Message from Charlynghaimarkie Alfreda sent at 10/13/2022  4:40 PM CDT -----  Contact: [phone]  Type:  RX Refill Request    Who Called: Marilee   Refill or New Rx:refil   RX Name and Strength:gabapentin (NEURONTIN) 300 MG capsule  How is the patient currently taking it? (ex. 1XDay): Take 1 capsule (300 mg total) by mouth Daily for 7 days, THEN 1 capsule (300 mg total) 2 (two) times daily for 23 days. Do not stop abruptly.. - Oral  Is this a 30 day or 90 day RX:90  Preferred Pharmacy with phone number:  [facility] [address] [address]  Phone: [phone] Fax: [phone]   Local or Mail Order:local   Ordering Provider:Dr Caballero   Would the patient rather a call back or a response via [facility]? Call back   Best Call Back Number:[phone]  Additional Information: pt stated that she only have 2 pills left and doesn't know if she would have to get another refill.       Thanks KB

## 2022-10-14 RX ORDER — GABAPENTIN 300 MG/1
300 CAPSULE ORAL 2 TIMES DAILY
Qty: 60 CAPSULE | Refills: 5 | Status: SHIPPED | OUTPATIENT
Start: 2022-10-14 | End: 2022-10-24 | Stop reason: SDUPTHER

## 2022-10-17 ENCOUNTER — TELEPHONE (OUTPATIENT)
Dept: RHEUMATOLOGY | Facility: CLINIC | Age: 22
End: 2022-10-17
Payer: MEDICAID

## 2022-10-17 NOTE — TELEPHONE ENCOUNTER
----- Message from Iris Redding sent at 10/17/2022  9:27 AM CDT -----  Contact: [facility]  Type:  Pharmacy Calling to Clarify an RX    Name of Caller: [facility]  Pharmacy Name:   [facility] [address]  [address] [facility] [address]  Phone: [phone] Fax: [phone]  Prescription Name: Gabapentin   What do they need to clarify?: Missing the Provider JANINE#  Best Call Back Number: Please call her at [phone]  Additional Information:

## 2022-10-24 ENCOUNTER — OFFICE VISIT (OUTPATIENT)
Dept: RHEUMATOLOGY | Facility: CLINIC | Age: 22
End: 2022-10-24
Payer: MEDICAID

## 2022-10-24 VITALS
SYSTOLIC BLOOD PRESSURE: 134 MMHG | RESPIRATION RATE: 19 BRPM | HEART RATE: 112 BPM | DIASTOLIC BLOOD PRESSURE: 84 MMHG | BODY MASS INDEX: 19.94 KG/M2 | HEIGHT: 65 IN | WEIGHT: 119.69 LBS

## 2022-10-24 DIAGNOSIS — M53.3 SI (SACROILIAC) PAIN: ICD-10-CM

## 2022-10-24 DIAGNOSIS — M54.6 PAIN IN THORACIC SPINE: Primary | ICD-10-CM

## 2022-10-24 DIAGNOSIS — M79.7 FIBROMYALGIA: ICD-10-CM

## 2022-10-24 PROCEDURE — 1159F PR MEDICATION LIST DOCUMENTED IN MEDICAL RECORD: ICD-10-PCS | Mod: CPTII,,, | Performed by: PHYSICIAN ASSISTANT

## 2022-10-24 PROCEDURE — 99999 PR PBB SHADOW E&M-EST. PATIENT-LVL IV: ICD-10-PCS | Mod: PBBFAC,,, | Performed by: PHYSICIAN ASSISTANT

## 2022-10-24 PROCEDURE — 3075F PR MOST RECENT SYSTOLIC BLOOD PRESS GE 130-139MM HG: ICD-10-PCS | Mod: CPTII,,, | Performed by: PHYSICIAN ASSISTANT

## 2022-10-24 PROCEDURE — 3079F DIAST BP 80-89 MM HG: CPT | Mod: CPTII,,, | Performed by: PHYSICIAN ASSISTANT

## 2022-10-24 PROCEDURE — 3079F PR MOST RECENT DIASTOLIC BLOOD PRESSURE 80-89 MM HG: ICD-10-PCS | Mod: CPTII,,, | Performed by: PHYSICIAN ASSISTANT

## 2022-10-24 PROCEDURE — 3075F SYST BP GE 130 - 139MM HG: CPT | Mod: CPTII,,, | Performed by: PHYSICIAN ASSISTANT

## 2022-10-24 PROCEDURE — 99999 PR PBB SHADOW E&M-EST. PATIENT-LVL IV: CPT | Mod: PBBFAC,,, | Performed by: PHYSICIAN ASSISTANT

## 2022-10-24 PROCEDURE — 99214 OFFICE O/P EST MOD 30 MIN: CPT | Mod: PBBFAC | Performed by: PHYSICIAN ASSISTANT

## 2022-10-24 PROCEDURE — 1159F MED LIST DOCD IN RCRD: CPT | Mod: CPTII,,, | Performed by: PHYSICIAN ASSISTANT

## 2022-10-24 PROCEDURE — 99214 PR OFFICE/OUTPT VISIT, EST, LEVL IV, 30-39 MIN: ICD-10-PCS | Mod: S$PBB,,, | Performed by: PHYSICIAN ASSISTANT

## 2022-10-24 PROCEDURE — 1160F RVW MEDS BY RX/DR IN RCRD: CPT | Mod: CPTII,,, | Performed by: PHYSICIAN ASSISTANT

## 2022-10-24 PROCEDURE — 99214 OFFICE O/P EST MOD 30 MIN: CPT | Mod: S$PBB,,, | Performed by: PHYSICIAN ASSISTANT

## 2022-10-24 PROCEDURE — 1160F PR REVIEW ALL MEDS BY PRESCRIBER/CLIN PHARMACIST DOCUMENTED: ICD-10-PCS | Mod: CPTII,,, | Performed by: PHYSICIAN ASSISTANT

## 2022-10-24 RX ORDER — GABAPENTIN 300 MG/1
300 CAPSULE ORAL 3 TIMES DAILY
Qty: 90 CAPSULE | Refills: 3 | Status: SHIPPED | OUTPATIENT
Start: 2022-10-24 | End: 2023-01-19

## 2022-10-24 RX ORDER — METHOCARBAMOL 500 MG/1
500 TABLET, FILM COATED ORAL 3 TIMES DAILY PRN
Qty: 90 TABLET | Refills: 2 | Status: SHIPPED | OUTPATIENT
Start: 2022-10-24 | End: 2022-12-06

## 2022-10-24 RX ORDER — DIAZEPAM 5 MG/1
TABLET ORAL
Qty: 2 TABLET | Refills: 0 | Status: SHIPPED | OUTPATIENT
Start: 2022-10-24 | End: 2022-11-30 | Stop reason: ALTCHOICE

## 2022-10-24 NOTE — PROGRESS NOTES
Subjective:      Patient ID: Marilee Dumas is a 22 y.o. female.    Chief Complaint: Fibromyalgia      HPI   Marilee Dumas  is a 22 y.o. female who is here for follow-up on fibromyalgia.  She has undergone physical therapy as well as multiple medications.  She has had injections and interventional procedures for symptomatic management by Dr. Glass.  Nothing seems to help her pain.  She has failed tizanidine, Cymbalta, Celebrex, injections, physical therapy, Flexeril, Tylenol, OTC NSAIDs.  Currently on Elavil 25 mg q.h.s. which does help her sleep.  She uses Flexeril p.r.n. but does not really notice any help in symptoms.  I started her on gabapentin 300 mg.  She is taking it b.i.d..  It is not causing any drowsy side effects.  It is giving her some minimal improvement.    Pain is concentrated in the upper back.  It has been going on for about 5 years now.  It started getting a lot worse about 2 years ago.  It is interfering with her functional level.  She denies radiating symptoms.  She has minimal pain in the lower back.  She does have some SI joint pain but the vast majority of her pain is in the upper thoracic region.  Pain follows a mixed pattern.  She has mechanical pain that worsens after about an hour of being active.  However pain overall is much worse at the end of the day when inactive.      She has had SI joint x-rays which were essentially normal.  She had blood work as well and comes today to follow-up on blood work.    Patient denies fevers, chills, photosensitivity, eye pain, shortness of breath, chest pain, hematuria, blood in the stool, rash, sicca symptoms, raynauds, finger ulcerations.  Rheumatologic systems otherwise negative.    Serologies/Labs:  Neg AMALIA  Current Treatment:  Elavil 25 mg q.h.s.  Flexeril  Previous Treatment:   Tizanidine  Cymbalta  Celebrex  Interventional injections by pain management  Physical therapy      Current Outpatient Medications:     amitriptyline (ELAVIL) 25  "MG tablet, Take 1 tablet (25 mg total) by mouth nightly as needed for Insomnia., Disp: 30 tablet, Rfl: 1    ondansetron (ZOFRAN-ODT) 4 MG TbDL, Take 1 tablet (4 mg total) by mouth every 6 (six) hours as needed., Disp: 15 tablet, Rfl: 0    sumatriptan (IMITREX) 50 MG tablet, Take at onset of migrane/headache.  If headache still remains, take a second dose.  Max 2 tablets per 24 hours, Disp: 12 tablet, Rfl: 1    albuterol (PROVENTIL/VENTOLIN HFA) 90 mcg/actuation inhaler, Inhale 1-2 puffs into the lungs every 6 (six) hours as needed for Wheezing., Disp: 8 g, Rfl: 0    diazePAM (VALIUM) 5 MG tablet, 1 PO one hr prior to mri.  Repeat once 30 min prior to mri if needed, Disp: 2 tablet, Rfl: 0    gabapentin (NEURONTIN) 300 MG capsule, Take 1 capsule (300 mg total) by mouth 3 (three) times daily. Do not stop abruptly., Disp: 90 capsule, Rfl: 3    methocarbamoL (ROBAXIN) 500 MG Tab, Take 1 tablet (500 mg total) by mouth 3 (three) times daily as needed (muscle spasms)., Disp: 90 tablet, Rfl: 2  No current facility-administered medications for this visit.    Facility-Administered Medications Ordered in Other Visits:     ondansetron injection 4 mg, 4 mg, Intravenous, Once PRN, Roland Glass MD    Past Medical History:   Diagnosis Date    Neck pain      History reviewed. No pertinent family history.  Social History     Socioeconomic History    Marital status: Single   Tobacco Use    Smoking status: Every Day     Types: Cigarettes    Smokeless tobacco: Former   Substance and Sexual Activity    Alcohol use: Never    Drug use: Yes     Types: Marijuana    Sexual activity: Yes     Partners: Female     Review of patient's allergies indicates:   Allergen Reactions    Penicillins        Objective:   /84   Pulse (!) 112   Resp 19   Ht 5' 5" (1.651 m)   Wt 54.3 kg (119 lb 11.4 oz)   BMI 19.92 kg/m²   Immunization History   Administered Date(s) Administered    DTaP 2002, 2002, 2002, 06/12/2003, 11/21/2006 "    HIB 2002, 06/12/2003    Hep B / HiB 2002, 2002    Hepatitis A, Pediatric/Adolescent, 2 Dose 06/10/2015, 07/12/2016    Hepatitis B, Pediatric/Adolescent 2002    IPV 2002, 2002, 2002, 11/21/2006    Influenza 12/02/2011    MMR 06/12/2003    MMRV 11/21/2006    Meningococcal Conjugate (MCV4P) 09/12/2013, 06/04/2018    Pneumococcal Conjugate - 7 Valent 2002, 2002, 06/12/2003    Tdap 09/12/2013    Varicella 08/14/2009       Physical Exam   Constitutional: She is oriented to person, place, and time. No distress.   HENT:   Head: Normocephalic and atraumatic.   Pulmonary/Chest: Effort normal.   Abdominal: She exhibits no distension.   Musculoskeletal:         General: No swelling or tenderness. Normal range of motion.      Cervical back: Normal range of motion.      Right knee: No effusion.      Left knee: No effusion.   Lymphadenopathy:     She has no cervical adenopathy.   Neurological: She is alert and oriented to person, place, and time.   Skin: Skin is warm.   Psychiatric: Mood normal.   Nursing note and vitals reviewed.      Right Side Rheumatological Exam     Examination finds the 1st PIP, 1st MCP, 2nd PIP, 2nd MCP, 3rd PIP, 3rd MCP, 4th PIP, 4th MCP, 5th PIP and 5th MCP normal.    Shoulder Exam   Tenderness Location: no tenderness    Range of Motion   The patient has normal right shoulder ROM.    Knee Exam   Patellofemoral Crepitus: negative  Effusion: negative  Warmth: negative    Elbow/Wrist Exam   Tenderness Location: no elbow tenderness and no wrist tenderness    Range of Motion   Elbow   The patient has normal right elbow ROM.    Range of Motion   Wrist   The patient has normal right wrist ROM.    Left Side Rheumatological Exam     Examination finds the 1st PIP, 1st MCP, 2nd PIP, 2nd MCP, 3rd PIP, 3rd MCP, 4th PIP, 4th MCP, 5th PIP and 5th MCP normal.    Shoulder Exam   Tenderness Location: no tenderness    Range of Motion   The patient has normal left  shoulder ROM.    Knee Exam     Patellofemoral Crepitus: negative  Effusion: negative  Warmth: negative    Elbow/Wrist Exam   Tenderness Location: no elbow tenderness and no wrist tenderness    Range of Motion   Elbow   The patient has normal left elbow ROM.    Range of Motion   Wrist   The patient has normal left wrist ROM.    4/18 traditional FM trigger pionts (Bilat SC jts, bilat traps)    No results found for this or any previous visit (from the past 672 hour(s)).  Recent Results (from the past 2016 hour(s))   CBC Auto Differential    Collection Time: 09/16/22  9:58 AM   Result Value Ref Range    WBC 6.24 3.90 - 12.70 K/uL    RBC 4.10 4.00 - 5.40 M/uL    Hemoglobin 12.6 12.0 - 16.0 g/dL    Hematocrit 37.5 37.0 - 48.5 %    MCV 92 82 - 98 fL    MCH 30.7 27.0 - 31.0 pg    MCHC 33.6 32.0 - 36.0 g/dL    RDW 11.8 11.5 - 14.5 %    Platelets 240 150 - 450 K/uL    MPV 8.7 (L) 9.2 - 12.9 fL    Immature Granulocytes 0.3 0.0 - 0.5 %    Gran # (ANC) 3.9 1.8 - 7.7 K/uL    Immature Grans (Abs) 0.02 0.00 - 0.04 K/uL    Lymph # 1.8 1.0 - 4.8 K/uL    Mono # 0.4 0.3 - 1.0 K/uL    Eos # 0.1 0.0 - 0.5 K/uL    Baso # 0.03 0.00 - 0.20 K/uL    nRBC 0 0 /100 WBC    Gran % 62.9 38.0 - 73.0 %    Lymph % 28.8 18.0 - 48.0 %    Mono % 6.7 4.0 - 15.0 %    Eosinophil % 0.8 0.0 - 8.0 %    Basophil % 0.5 0.0 - 1.9 %    Differential Method Automated    Comprehensive Metabolic Panel    Collection Time: 09/16/22  9:58 AM   Result Value Ref Range    Sodium 137 136 - 145 mmol/L    Potassium 4.5 3.5 - 5.1 mmol/L    Chloride 103 95 - 110 mmol/L    CO2 28 23 - 29 mmol/L    Glucose 93 70 - 110 mg/dL    BUN 12 6 - 20 mg/dL    Creatinine 0.7 0.5 - 1.4 mg/dL    Calcium 9.6 8.7 - 10.5 mg/dL    Total Protein 7.0 6.0 - 8.4 g/dL    Albumin 4.2 3.5 - 5.2 g/dL    Total Bilirubin 0.5 0.1 - 1.0 mg/dL    Alkaline Phosphatase 55 55 - 135 U/L    AST 18 10 - 40 U/L    ALT 15 10 - 44 U/L    Anion Gap 6 (L) 8 - 16 mmol/L    eGFR >60 >60 mL/min/1.73 m^2   Sedimentation  rate    Collection Time: 09/16/22  9:58 AM   Result Value Ref Range    Sed Rate 4 0 - 36 mm/Hr   C-Reactive Protein    Collection Time: 09/16/22  9:58 AM   Result Value Ref Range    CRP 0.8 0.0 - 8.2 mg/L   HLA B27 Antigen    Collection Time: 09/16/22  9:58 AM   Result Value Ref Range    HLA B27 Interpretation SAPE: 211  TAQ: [id number]       B27 Testing Date 10/07/2022 07:47 AM     HLA B27 Result Negative           No results found for: TBGOLDPLUS   No results found for: HAV, HEPAIGM, HEPBIGM, HEPBCAB, HBEAG, HEPCAB     Imaging  I have personally reviewed images and reports as below.  I agree with the interpretation.  X-Ray Sacroiliac Joints Complete  Narrative: EXAMINATION:  XR SACROILIAC JOINTS COMPLETE    CLINICAL HISTORY:  Sacrococcygeal disorders, not elsewhere classified    TECHNIQUE:  AP/oblique views    COMPARISON:  None    FINDINGS:  SI joints are symmetric without sclerosis, erosion, widening or ankylosis.  Impression: As above    Electronically signed by: Claude York MD  Date:    09/16/2022  Time:    10:22  Next appt: 09/21/2022 at 01:20 PM in Pain Medicine (Aan Carbone PA-C)     Dx: Chronic bilateral thoracic back pain     0 Result Notes  Details    Reading Physician Reading Date Result Priority   Jerzy Stevenson MD  [phone] 5/6/2022 Routine     Narrative & Impression  EXAMINATION:  XR THORACIC SPINE 4 OR MORE VIEWS     CLINICAL HISTORY:  Pain in thoracic spine     TECHNIQUE:  AP, lateral, and bilateral oblique views of the thoracic spine were obtained.     COMPARISON:  None     FINDINGS:  There is mild levoscoliosis of the lower thoracic spine.  Vertebral body heights are well maintained.  No spondylolisthesis demonstrated.  Intervertebral disc heights are appear preserved.  No acute fractures or subluxations visualized.     Impression:     As above.        Electronically signed by: Jerzy Stevenson MD  Date:                                            05/06/2022  Time:                                            14:24     MRI Lumbar Spine Without Contrast  Order: [id number]  Status: Final result     Visible to patient: Yes (seen)     Next appt: 09/21/2022 at 01:20 PM in Pain Medicine (Ana Carbone PA-C)     Dx: Dorsalgia, unspecified; Scoliosis, un...     0 Result Notes  Details    Reading Physician Reading Date Result Priority   Jerzy Stevenson MD  [phone] 9/10/2021 Routine     Narrative & Impression  EXAMINATION:  MRI LUMBAR SPINE WITHOUT CONTRAST     CLINICAL HISTORY:  Back pain or radiculopathy, > 6 wks; Dorsalgia, unspecified     TECHNIQUE:  Multiplanar, multisequence MR images were acquired from the thoracolumbar junction to the sacrum without the administration of contrast.     COMPARISON:  None.     FINDINGS:  Alignment: There is slight rightward rotation of multiple upper lumbar vertebral bodies, likely related to mild thoracic scoliosis.     Vertebrae: Normal marrow signal. No fracture.     Discs: Normal height and signal.     Cord: Normal.  Conus terminates at L1     Degenerative findings:     No significant disc bulge, spinal canal stenosis, or neural foraminal narrowing.     Paraspinal muscles & soft tissues: Unremarkable.     Impression:     As above.  No acute findings.        Electronically signed by: Jerzy Stevenson MD  Date:                                            09/10/2021  Time:                                           12:22     MRI Cervical Spine Without Contrast  Order: [id number]  Status: Final result     Visible to patient: Yes (seen)     Next appt: 09/21/2022 at 01:20 PM in Pain Medicine (Ana Carbone PA-C)     Dx: Radiculopathy, cervical region; Scoli...     0 Result Notes  Details    Reading Physician Reading Date Result Priority   Jerzy Stevenson MD  [phone] 9/10/2021 Routine     Narrative & Impression  EXAMINATION:  MRI CERVICAL SPINE WITHOUT CONTRAST     CLINICAL HISTORY:  Cervical radiculopathy;.  Radiculopathy, cervical  region     TECHNIQUE:  Multiplanar, multisequence MR images of the cervical spine were acquired without the administration of contrast.     COMPARISON:  None.     FINDINGS:  Image quality is degraded by motion, lowering exam sensitivity and specificity.  Interpretation is offered within these confines.     Vertebral body heights and alignment are within normal limits. Intervertebral disc spaces are well preserved.  Marrow signal throughout the visualized osseous structures is within normal limits with no evidence of fracture or marrow replacement process.     Cervical cord is normal in signal and caliber.     Limited evaluation of the cervical soft tissues demonstrates no gross abnormality.     C2-3:   No spinal canal or neural foraminal stenosis.     C3-4:  No spinal canal or neural foraminal stenosis.     C4-5:  No spinal canal or neural foraminal stenosis.     C5-6:  No spinal canal or neural foraminal stenosis.     C6-7:  No spinal canal or neural foraminal stenosis.     C7-T1:  No spinal canal or neural foraminal stenosis.     Impression:     Unremarkable MRI of the cervical spine.        Electronically signed by: Jerzy Stevenson MD  Date:                                            09/10/2021  Time:                                           12:09     Assessment:     1. Fibromyalgia              Plan:     Marilee was seen today for fibromyalgia.    Diagnoses and all orders for this visit:    Fibromyalgia  -     methocarbamoL (ROBAXIN) 500 MG Tab; Take 1 tablet (500 mg total) by mouth 3 (three) times daily as needed (muscle spasms).  -     diazePAM (VALIUM) 5 MG tablet; 1 PO one hr prior to mri.  Repeat once 30 min prior to mri if needed  -     gabapentin (NEURONTIN) 300 MG capsule; Take 1 capsule (300 mg total) by mouth 3 (three) times daily. Do not stop abruptly.      Back pain  Mainly upper back  Mixed pattern (both inflammatory and mechanical pain)  MRI w/wo T-spine  Failed Celebrex, tylenol, OTC  NSAIDs  Failed PT  Failed bilateral med branch block at T3, T4, and T5  Failed tizanidine and flexeril as well  Elavil helping her sleep better  TTP myofascial region but historical trigger point for FM largely non tender  Some SI jt pain  Xray SI jts - no erosions  ESR/CRP WNL  HLA B27 negative  Gabapentin for Myofascial pain  Increase to 300 mg tid  Flexeril ineffective, as is Tizanidine  Switch to Robaxin 500mg tid  Naltrexone 4.5 mg qhs  Return to clinic: VV 2 mos    No follow-ups on file.    The patient understands, chooses and consents to this plan and accepts all   the risks which include but are not limited to the risks mentioned above.     Disclaimer: This note was prepared using a voice recognition system and is likely to have sound alike errors within the text.

## 2022-11-03 ENCOUNTER — TELEPHONE (OUTPATIENT)
Dept: PAIN MEDICINE | Facility: CLINIC | Age: 22
End: 2022-11-03
Payer: MEDICAID

## 2022-11-03 NOTE — TELEPHONE ENCOUNTER
----- Message from Vicki Redding sent at 11/3/2022  2:09 PM CDT -----  Pt need a refill on amitriptyline (ELAVIL) 25 MG tablet        [facility] - [address] [address]  Phone: [phone] Fax: [phone]      Pt can be reached at [phone] (home)

## 2022-11-03 NOTE — TELEPHONE ENCOUNTER
Call to inform pt that a additional refill for Elavil was at her pharmacy. Pt voiced understanding and had to further questions.  .Margot VILLALOBOS

## 2022-11-07 ENCOUNTER — PATIENT MESSAGE (OUTPATIENT)
Dept: RHEUMATOLOGY | Facility: CLINIC | Age: 22
End: 2022-11-07
Payer: MEDICAID

## 2022-11-07 ENCOUNTER — HOSPITAL ENCOUNTER (OUTPATIENT)
Dept: RADIOLOGY | Facility: HOSPITAL | Age: 22
Discharge: HOME | End: 2022-11-07
Attending: PHYSICIAN ASSISTANT
Payer: MEDICAID

## 2022-11-07 DIAGNOSIS — M54.6 PAIN IN THORACIC SPINE: ICD-10-CM

## 2022-11-07 PROCEDURE — 25500020 PHARM REV CODE 255: Mod: PO | Performed by: PHYSICIAN ASSISTANT

## 2022-11-07 PROCEDURE — 72157 MRI THORACIC SPINE W WO CONTRAST: ICD-10-PCS | Mod: 26,,, | Performed by: RADIOLOGY

## 2022-11-07 PROCEDURE — 72157 MRI CHEST SPINE W/O & W/DYE: CPT | Mod: TC,PO

## 2022-11-07 PROCEDURE — A9585 GADOBUTROL INJECTION: HCPCS | Mod: PO | Performed by: PHYSICIAN ASSISTANT

## 2022-11-07 PROCEDURE — 72157 MRI CHEST SPINE W/O & W/DYE: CPT | Mod: 26,,, | Performed by: RADIOLOGY

## 2022-11-07 RX ORDER — GADOBUTROL 604.72 MG/ML
5 INJECTION INTRAVENOUS
Status: COMPLETED | OUTPATIENT
Start: 2022-11-07 | End: 2022-11-07

## 2022-11-07 RX ADMIN — GADOBUTROL 5 ML: 604.72 INJECTION INTRAVENOUS at 11:11

## 2022-11-09 ENCOUNTER — TELEPHONE (OUTPATIENT)
Dept: PAIN MEDICINE | Facility: CLINIC | Age: 22
End: 2022-11-09
Payer: MEDICAID

## 2022-11-09 DIAGNOSIS — G89.4 CHRONIC PAIN DISORDER: Primary | ICD-10-CM

## 2022-11-09 NOTE — TELEPHONE ENCOUNTER
Called patient to inform her that the referral has been sent to Dr. Johns's office.  Patient verbalized understanding

## 2022-11-09 NOTE — TELEPHONE ENCOUNTER
----- Message from Ana Carbone PA-C sent at 11/9/2022  9:55 AM CST -----  Contact: [phone]  I have placed 2 referrals to psychiatry. They both stated pending. We can submit again, verify provider she prefers if possible.    Ana  ----- Message -----  From: Pepe Wood MA  Sent: 11/9/2022   8:26 AM CST  To: Ana Carbone PA-C    Mrs. Dumas wants referral for psychiatry.  She states that you have been handling this for her.  ----- Message -----  From: Pepe Wood MA  Sent: 11/8/2022   5:24 PM CST  To: Pepe Wood MA      ----- Message -----  From: Flor Chiu  Sent: 11/8/2022   3:45 PM CST  To: Raf Perez Staff    Patient would like to consult with a nurse in regards to a referral for psychiatry. She stated it will be for an outside provider. Please give her a call back at [phone]. Thanks rJoses

## 2022-11-10 ENCOUNTER — TELEPHONE (OUTPATIENT)
Dept: RHEUMATOLOGY | Facility: CLINIC | Age: 22
End: 2022-11-10
Payer: MEDICAID

## 2022-11-10 NOTE — TELEPHONE ENCOUNTER
----- Message from Julia Ambriz sent at 11/10/2022  9:05 AM CST -----  Contact: Marilee Hunt is calling in regards to naltrexone capsule. Patient states she hasn't started taking it due to it was throwing away . Please call her back at [phone]      Thanks  CF

## 2022-11-10 NOTE — TELEPHONE ENCOUNTER
Patient called and wanted to let you know that she has not been able to take the naltrexone rx because when it got delivered to her home her significant other didn't know what it was and threw it away. She will order the medication when she can physically pay for it. She just wanted to let you know.

## 2022-11-14 ENCOUNTER — TELEPHONE (OUTPATIENT)
Dept: RHEUMATOLOGY | Facility: CLINIC | Age: 22
End: 2022-11-14
Payer: MEDICAID

## 2022-11-14 NOTE — TELEPHONE ENCOUNTER
Patient aware to contact pharmacy, she should have a refill remaining. She can explain to them that someone accidentally threw away her medication and see if they will issue an early refill

## 2022-11-14 NOTE — TELEPHONE ENCOUNTER
----- Message from Renny Wood sent at 11/14/2022 11:31 AM CST -----  Contact: Self  ..Type:  RX Refill Request    Who Called: .Marilee Dumas  Refill or New Rx: Refill   RX Name and Strength: naltrexone capsule  How is the patient currently taking it? (ex. 1XDay): 1xday  Is this a 30 day or 90 day RX 90  Preferred Pharmacy with phone number:.  [facility] [place] - [address]  [address]  [employer] [address]  Phone: [phone] Fax: [phone]  Local or Mail Order: Local   Ordering Provider: Federico   Would the patient rather a call back or a response via [facility]?  Call back   Best Call Back Number:.[phone] (home)   Additional Information:

## 2022-11-30 ENCOUNTER — OFFICE VISIT (OUTPATIENT)
Dept: PAIN MEDICINE | Facility: CLINIC | Age: 22
End: 2022-11-30
Payer: MEDICAID

## 2022-11-30 DIAGNOSIS — G89.4 CHRONIC PAIN DISORDER: ICD-10-CM

## 2022-11-30 DIAGNOSIS — M47.814 THORACIC SPONDYLOSIS: Primary | ICD-10-CM

## 2022-11-30 DIAGNOSIS — M79.7 FIBROMYALGIA: ICD-10-CM

## 2022-11-30 PROCEDURE — 1159F MED LIST DOCD IN RCRD: CPT | Mod: CPTII,95,, | Performed by: PHYSICAL MEDICINE & REHABILITATION

## 2022-11-30 PROCEDURE — 99214 PR OFFICE/OUTPT VISIT, EST, LEVL IV, 30-39 MIN: ICD-10-PCS | Mod: 95,,, | Performed by: PHYSICAL MEDICINE & REHABILITATION

## 2022-11-30 PROCEDURE — 1160F RVW MEDS BY RX/DR IN RCRD: CPT | Mod: CPTII,95,, | Performed by: PHYSICAL MEDICINE & REHABILITATION

## 2022-11-30 PROCEDURE — 1160F PR REVIEW ALL MEDS BY PRESCRIBER/CLIN PHARMACIST DOCUMENTED: ICD-10-PCS | Mod: CPTII,95,, | Performed by: PHYSICAL MEDICINE & REHABILITATION

## 2022-11-30 PROCEDURE — 1159F PR MEDICATION LIST DOCUMENTED IN MEDICAL RECORD: ICD-10-PCS | Mod: CPTII,95,, | Performed by: PHYSICAL MEDICINE & REHABILITATION

## 2022-11-30 PROCEDURE — 99214 OFFICE O/P EST MOD 30 MIN: CPT | Mod: 95,,, | Performed by: PHYSICAL MEDICINE & REHABILITATION

## 2022-11-30 RX ORDER — AMITRIPTYLINE HYDROCHLORIDE 50 MG/1
50 TABLET, FILM COATED ORAL NIGHTLY PRN
Qty: 30 TABLET | Refills: 1 | Status: SHIPPED | OUTPATIENT
Start: 2022-11-30 | End: 2023-02-03
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
## 2022-12-06 ENCOUNTER — OFFICE VISIT (OUTPATIENT)
Dept: RHEUMATOLOGY | Facility: CLINIC | Age: 22
End: 2022-12-06
Payer: MEDICAID

## 2022-12-06 DIAGNOSIS — M54.89 INFLAMMATORY BACK PAIN: ICD-10-CM

## 2022-12-06 DIAGNOSIS — M53.3 SI (SACROILIAC) PAIN: ICD-10-CM

## 2022-12-06 DIAGNOSIS — M54.6 PAIN IN THORACIC SPINE: Primary | ICD-10-CM

## 2022-12-06 DIAGNOSIS — M79.7 FIBROMYALGIA: ICD-10-CM

## 2022-12-06 PROCEDURE — 99214 OFFICE O/P EST MOD 30 MIN: CPT | Mod: 95,,, | Performed by: PHYSICIAN ASSISTANT

## 2022-12-06 PROCEDURE — 99214 PR OFFICE/OUTPT VISIT, EST, LEVL IV, 30-39 MIN: ICD-10-PCS | Mod: 95,,, | Performed by: PHYSICIAN ASSISTANT

## 2022-12-06 RX ORDER — PREDNISONE 5 MG/1
10 TABLET ORAL DAILY
Qty: 30 TABLET | Refills: 1 | Status: SHIPPED | OUTPATIENT
Start: 2022-12-06 | End: 2023-01-05

## 2022-12-06 NOTE — PROGRESS NOTES
The patient location is: home  The chief complaint leading to consultation is: pain    Visit type: audiovisual    Face to Face time with patient: 20  30 minutes of total time spent on the encounter, which includes face to face time and non-face to face time preparing to see the patient (eg, review of tests), Obtaining and/or reviewing separately obtained history, Documenting clinical information in the electronic or other health record, Independently interpreting results (not separately reported) and communicating results to the patient/family/caregiver, or Care coordination (not separately reported).       Each patient to whom he or she provides medical services by telemedicine is:  (1) informed of the relationship between the physician and patient and the respective role of any other health care provider with respect to management of the patient; and (2) notified that he or she may decline to receive medical services by telemedicine and may withdraw from such care at any time.    Notes:     Subjective:      Patient ID: Marilee Dumas is a 22 y.o. female.    Chief Complaint: No chief complaint on file.      HPI   Marilee Dumas  is a 22 y.o. female who is here for follow-up on fibromyalgia.  She has undergone physical therapy as well as multiple medications.  She has had injections and interventional procedures for symptomatic management by Dr. Glass.  Nothing seems to help her pain.  She has failed tizanidine, Cymbalta, Celebrex, injections, physical therapy, Flexeril, Tylenol, OTC NSAIDs.  Currently on Elavil 25 mg q.h.s. which does help her sleep.  She hasn't improved with tizanidine, Flexeril, nor Robaxin.  I started her on gabapentin 300 mg.  Last visit we increased to t.i.d..  She notes some improvement.       Pain is concentrated in the upper back.  It has been going on for about 5 years now.  It started getting a lot worse about 2 years ago.  It is interfering with her functional level.  She denies  radiating symptoms.  She has minimal pain in the lower back.  She does have some SI joint pain but the vast majority of her pain is in the upper thoracic region.  Pain follows a mixed pattern.  She has mechanical pain that worsens after about an hour of being active.  However pain overall is much worse at the end of the day when inactive.      She has had SI joint x-rays which were essentially normal.  MRI si joints and t-spine are unremarkable.    Patient denies fevers, chills, photosensitivity, eye pain, shortness of breath, chest pain, hematuria, blood in the stool, rash, sicca symptoms, raynauds, finger ulcerations.  Rheumatologic systems otherwise negative.    Serologies/Labs:  Neg AMALIA  Current Treatment:  Elavil 25 mg q.h.s.  Flexeril  Previous Treatment:   Tizanidine  Cymbalta  Celebrex  Interventional injections by pain management  Physical therapy      Current Outpatient Medications:     albuterol (PROVENTIL/VENTOLIN HFA) 90 mcg/actuation inhaler, Inhale 1-2 puffs into the lungs every 6 (six) hours as needed for Wheezing., Disp: 8 g, Rfl: 0    amitriptyline (ELAVIL) 50 MG tablet, Take 1 tablet (50 mg total) by mouth nightly as needed for Insomnia., Disp: 30 tablet, Rfl: 1    gabapentin (NEURONTIN) 300 MG capsule, Take 1 capsule (300 mg total) by mouth 3 (three) times daily. Do not stop abruptly., Disp: 90 capsule, Rfl: 3    naltrexone capsule, Take 1 capsule (4.5 mg total) by mouth every evening., Disp: 90 capsule, Rfl: 1    ondansetron (ZOFRAN-ODT) 4 MG TbDL, Take 1 tablet (4 mg total) by mouth every 6 (six) hours as needed., Disp: 15 tablet, Rfl: 0    predniSONE (DELTASONE) 5 MG tablet, Take 2 tablets (10 mg total) by mouth once daily., Disp: 30 tablet, Rfl: 1  No current facility-administered medications for this visit.    Facility-Administered Medications Ordered in Other Visits:     ondansetron injection 4 mg, 4 mg, Intravenous, Once PRN, Roland Glass MD    Past Medical History:   Diagnosis Date     Neck pain      No family history on file.  Social History     Socioeconomic History    Marital status: Single   Tobacco Use    Smoking status: Every Day     Types: Cigarettes    Smokeless tobacco: Former   Substance and Sexual Activity    Alcohol use: Never    Drug use: Yes     Types: Marijuana    Sexual activity: Yes     Partners: Female     Review of patient's allergies indicates:   Allergen Reactions    Penicillins        Objective:   There were no vitals taken for this visit.  Immunization History   Administered Date(s) Administered    DTaP 2002, 2002, 2002, 06/12/2003, 11/21/2006    HIB 2002, 06/12/2003    Hep B / HiB 2002, 2002    Hepatitis A, Pediatric/Adolescent, 2 Dose 06/10/2015, 07/12/2016    Hepatitis B, Pediatric/Adolescent 2002    IPV 2002, 2002, 2002, 11/21/2006    Influenza 12/02/2011    MMR 06/12/2003    MMRV 11/21/2006    Meningococcal Conjugate (MCV4P) 09/12/2013, 06/04/2018    Pneumococcal Conjugate - 7 Valent 2002, 2002, 06/12/2003    Tdap 09/12/2013    Varicella 08/14/2009       Physical Exam   Constitutional: She is oriented to person, place, and time. No distress.   HENT:   Head: Normocephalic and atraumatic.   Pulmonary/Chest: Effort normal.   Musculoskeletal:      Cervical back: Normal range of motion.   Neurological: She is alert and oriented to person, place, and time.   Psychiatric: Mood normal. 4/18 traditional FM trigger pionts (Bilat SC jts, bilat traps)    No results found for this or any previous visit (from the past 672 hour(s)).  Recent Results (from the past 2016 hour(s))   CBC Auto Differential    Collection Time: 09/16/22  9:58 AM   Result Value Ref Range    WBC 6.24 3.90 - 12.70 K/uL    RBC 4.10 4.00 - 5.40 M/uL    Hemoglobin 12.6 12.0 - 16.0 g/dL    Hematocrit 37.5 37.0 - 48.5 %    MCV 92 82 - 98 fL    MCH 30.7 27.0 - 31.0 pg    MCHC 33.6 32.0 - 36.0 g/dL    RDW 11.8 11.5 - 14.5 %    Platelets 240 150  - 450 K/uL    MPV 8.7 (L) 9.2 - 12.9 fL    Immature Granulocytes 0.3 0.0 - 0.5 %    Gran # (ANC) 3.9 1.8 - 7.7 K/uL    Immature Grans (Abs) 0.02 0.00 - 0.04 K/uL    Lymph # 1.8 1.0 - 4.8 K/uL    Mono # 0.4 0.3 - 1.0 K/uL    Eos # 0.1 0.0 - 0.5 K/uL    Baso # 0.03 0.00 - 0.20 K/uL    nRBC 0 0 /100 WBC    Gran % 62.9 38.0 - 73.0 %    Lymph % 28.8 18.0 - 48.0 %    Mono % 6.7 4.0 - 15.0 %    Eosinophil % 0.8 0.0 - 8.0 %    Basophil % 0.5 0.0 - 1.9 %    Differential Method Automated    Comprehensive Metabolic Panel    Collection Time: 09/16/22  9:58 AM   Result Value Ref Range    Sodium 137 136 - 145 mmol/L    Potassium 4.5 3.5 - 5.1 mmol/L    Chloride 103 95 - 110 mmol/L    CO2 28 23 - 29 mmol/L    Glucose 93 70 - 110 mg/dL    BUN 12 6 - 20 mg/dL    Creatinine 0.7 0.5 - 1.4 mg/dL    Calcium 9.6 8.7 - 10.5 mg/dL    Total Protein 7.0 6.0 - 8.4 g/dL    Albumin 4.2 3.5 - 5.2 g/dL    Total Bilirubin 0.5 0.1 - 1.0 mg/dL    Alkaline Phosphatase 55 55 - 135 U/L    AST 18 10 - 40 U/L    ALT 15 10 - 44 U/L    Anion Gap 6 (L) 8 - 16 mmol/L    eGFR >60 >60 mL/min/1.73 m^2   Sedimentation rate    Collection Time: 09/16/22  9:58 AM   Result Value Ref Range    Sed Rate 4 0 - 36 mm/Hr   C-Reactive Protein    Collection Time: 09/16/22  9:58 AM   Result Value Ref Range    CRP 0.8 0.0 - 8.2 mg/L   HLA B27 Antigen    Collection Time: 09/16/22  9:58 AM   Result Value Ref Range    HLA B27 Interpretation SAPE: 211  TAQ: [id number]       B27 Testing Date 10/07/2022 07:47 AM     HLA B27 Result Negative           No results found for: TBGOLDPLUS   No results found for: HAV, HEPAIGM, HEPBIGM, HEPBCAB, HBEAG, HEPCAB     Imaging  I have personally reviewed images and reports as below.  I agree with the interpretation.  MRI Thoracic Spine W WO Cont  Narrative: EXAMINATION:  MRI THORACIC SPINE W WO CONTRAST    CLINICAL HISTORY:  Mid-back pain;  Pain in thoracic spine    TECHNIQUE:  Multiplanar/multisequence MRI of the thoracic spine was obtained prior to  and following administration of 5IV Gadavist.    COMPARISON:  Radiographs dated 05/06/2022    FINDINGS:  There is some rightward convexity of the visualized lower lumbar spine.  Vertebral body heights are within normal limits.  No spondylolisthesis demonstrated.  The intervertebral disc spaces are preserved.  Marrow signal is within normal limits with no evidence of fracture or marrow replacement process.    The thoracic cord is normal and signal and caliber.    Paraspinous soft tissues are within normal limits.    Limited evaluation of the intrathoracic and upper abdominal structures demonstrates no gross abnormality.    No significant disc bulge, spinal canal stenosis, or neural foraminal narrowing demonstrated.    There is no abnormal enhancement.  Impression: Essentially unremarkable MRI of the thoracic spine with no evidence of spinal canal stenosis or neural foraminal narrowing.  No abnormal enhancement demonstrated.    Electronically signed by: Jerzy Stevenson MD  Date:    11/07/2022  Time:    12:21  Next appt: 09/21/2022 at 01:20 PM in Pain Medicine (Ana Carbone PA-C)     Dx: Chronic bilateral thoracic back pain     0 Result Notes  Details    Reading Physician Reading Date Result Priority   Jerzy Stevenson MD  [phone] 5/6/2022 Routine     Narrative & Impression  EXAMINATION:  XR THORACIC SPINE 4 OR MORE VIEWS     CLINICAL HISTORY:  Pain in thoracic spine     TECHNIQUE:  AP, lateral, and bilateral oblique views of the thoracic spine were obtained.     COMPARISON:  None     FINDINGS:  There is mild levoscoliosis of the lower thoracic spine.  Vertebral body heights are well maintained.  No spondylolisthesis demonstrated.  Intervertebral disc heights are appear preserved.  No acute fractures or subluxations visualized.     Impression:     As above.        Electronically signed by: Jerzy Stevenson MD  Date:                                            05/06/2022  Time:                                            14:24     MRI Lumbar Spine Without Contrast  Order: [id number]  Status: Final result     Visible to patient: Yes (seen)     Next appt: 09/21/2022 at 01:20 PM in Pain Medicine (Ana Carbone PA-C)     Dx: Dorsalgia, unspecified; Scoliosis, un...     0 Result Notes  Details    Reading Physician Reading Date Result Priority   Jerzy Stevenson MD  [phone] 9/10/2021 Routine     Narrative & Impression  EXAMINATION:  MRI LUMBAR SPINE WITHOUT CONTRAST     CLINICAL HISTORY:  Back pain or radiculopathy, > 6 wks; Dorsalgia, unspecified     TECHNIQUE:  Multiplanar, multisequence MR images were acquired from the thoracolumbar junction to the sacrum without the administration of contrast.     COMPARISON:  None.     FINDINGS:  Alignment: There is slight rightward rotation of multiple upper lumbar vertebral bodies, likely related to mild thoracic scoliosis.     Vertebrae: Normal marrow signal. No fracture.     Discs: Normal height and signal.     Cord: Normal.  Conus terminates at L1     Degenerative findings:     No significant disc bulge, spinal canal stenosis, or neural foraminal narrowing.     Paraspinal muscles & soft tissues: Unremarkable.     Impression:     As above.  No acute findings.        Electronically signed by: Jerzy Stevenson MD  Date:                                            09/10/2021  Time:                                           12:22     MRI Cervical Spine Without Contrast  Order: [id number]  Status: Final result     Visible to patient: Yes (seen)     Next appt: 09/21/2022 at 01:20 PM in Pain Medicine (Ana Carbone PA-C)     Dx: Radiculopathy, cervical region; Scoli...     0 Result Notes  Details    Reading Physician Reading Date Result Priority   Jerzy Stevenson MD  [phone] 9/10/2021 Routine     Narrative & Impression  EXAMINATION:  MRI CERVICAL SPINE WITHOUT CONTRAST     CLINICAL HISTORY:  Cervical radiculopathy;.  Radiculopathy, cervical region      TECHNIQUE:  Multiplanar, multisequence MR images of the cervical spine were acquired without the administration of contrast.     COMPARISON:  None.     FINDINGS:  Image quality is degraded by motion, lowering exam sensitivity and specificity.  Interpretation is offered within these confines.     Vertebral body heights and alignment are within normal limits. Intervertebral disc spaces are well preserved.  Marrow signal throughout the visualized osseous structures is within normal limits with no evidence of fracture or marrow replacement process.     Cervical cord is normal in signal and caliber.     Limited evaluation of the cervical soft tissues demonstrates no gross abnormality.     C2-3:   No spinal canal or neural foraminal stenosis.     C3-4:  No spinal canal or neural foraminal stenosis.     C4-5:  No spinal canal or neural foraminal stenosis.     C5-6:  No spinal canal or neural foraminal stenosis.     C6-7:  No spinal canal or neural foraminal stenosis.     C7-T1:  No spinal canal or neural foraminal stenosis.     Impression:     Unremarkable MRI of the cervical spine.        Electronically signed by: Jerzy Stevenson MD  Date:                                            09/10/2021  Time:                                           12:09     Assessment:     1. Pain in thoracic spine    2. SI (sacroiliac) pain    3. Fibromyalgia    4. Inflammatory back pain              Plan:     Diagnoses and all orders for this visit:    Pain in thoracic spine  -     predniSONE (DELTASONE) 5 MG tablet; Take 2 tablets (10 mg total) by mouth once daily.    SI (sacroiliac) pain  -     predniSONE (DELTASONE) 5 MG tablet; Take 2 tablets (10 mg total) by mouth once daily.    Fibromyalgia  -     predniSONE (DELTASONE) 5 MG tablet; Take 2 tablets (10 mg total) by mouth once daily.    Inflammatory back pain  -     predniSONE (DELTASONE) 5 MG tablet; Take 2 tablets (10 mg total) by mouth once daily.      Back pain  Mainly upper  back  Mixed pattern (both inflammatory and mechanical pain)  MRI w/wo T-spine  Failed Celebrex, tylenol, OTC NSAIDs  Failed PT  Failed bilateral med branch block at T3, T4, and T5  Failed tizanidine, and robaxin and flexeril as well  Elavil helping her sleep better  TTP myofascial region but historical trigger point for FM largely non tender  Some SI jt pain  Xray SI jts - no erosions  MRI negative  Repeat ESR/CRP this week  HLA B27 negative  Steroid trial Prednisone 10 mg daily x 4 weeks then 5 mg daily x 2 weeks  Gabapentin for Myofascial pain  Increase to 300 mg tid  No relief w tizanidine, robaxin, flexeril  Naltrexone 4.5 mg qhs - started about 2 weeks ago  Depression/anxiety  Worsening  Working to establish care w psych  No suicidal thoughts or ideations  No thoughts of harm  Return to clinic: 6 weeks    Follow up in about 6 weeks (around 1/17/2023).    The patient understands, chooses and consents to this plan and accepts all the risks which include but are not limited to the risks mentioned above.     Disclaimer: This note was prepared using a voice recognition system and is likely to have sound alike errors within the text.

## 2022-12-06 NOTE — PATIENT INSTRUCTIONS
Steroid trial Prednisone 10 mg daily x 4 weeks then 5 mg daily x 2 weeks    Get labs prior to starting steroid trial

## 2022-12-06 NOTE — Clinical Note
F/u me in 6 weeks.  Needs esr/crp close to home ([facility]) this week b/f she starts prednisone.  No labs at f/u

## 2022-12-08 ENCOUNTER — PATIENT MESSAGE (OUTPATIENT)
Dept: RHEUMATOLOGY | Facility: CLINIC | Age: 22
End: 2022-12-08
Payer: MEDICAID

## 2022-12-08 DIAGNOSIS — M53.3 SI (SACROILIAC) PAIN: Primary | ICD-10-CM

## 2022-12-08 NOTE — TELEPHONE ENCOUNTER
Sorry, I could not find any evidence of inflammatory autoimmune connective tissue disease which could explain her pain upon chart review. Please advice to follow up with back and spine clinic for worsening pain.

## 2022-12-09 ENCOUNTER — LAB VISIT (OUTPATIENT)
Dept: LAB | Facility: HOSPITAL | Age: 22
End: 2022-12-09
Attending: PHYSICIAN ASSISTANT
Payer: MEDICAID

## 2022-12-09 DIAGNOSIS — M53.3 SI (SACROILIAC) PAIN: Primary | ICD-10-CM

## 2022-12-09 DIAGNOSIS — M53.3 SI (SACROILIAC) PAIN: ICD-10-CM

## 2022-12-09 DIAGNOSIS — M79.7 FIBROMYALGIA: ICD-10-CM

## 2022-12-09 LAB
CRP SERPL-MCNC: 0.6 MG/L (ref 0–8.2)
ERYTHROCYTE [SEDIMENTATION RATE] IN BLOOD BY PHOTOMETRIC METHOD: 14 MM/HR (ref 0–36)

## 2022-12-09 PROCEDURE — 85652 RBC SED RATE AUTOMATED: CPT | Performed by: PHYSICIAN ASSISTANT

## 2022-12-09 PROCEDURE — 36415 COLL VENOUS BLD VENIPUNCTURE: CPT | Performed by: PHYSICIAN ASSISTANT

## 2022-12-09 PROCEDURE — 86140 C-REACTIVE PROTEIN: CPT | Performed by: PHYSICIAN ASSISTANT

## 2022-12-09 RX ORDER — DIAZEPAM 5 MG/1
TABLET ORAL
Qty: 2 TABLET | Refills: 0 | Status: SHIPPED | OUTPATIENT
Start: 2022-12-09 | End: 2023-01-12 | Stop reason: SDUPTHER

## 2022-12-13 ENCOUNTER — PATIENT MESSAGE (OUTPATIENT)
Dept: PAIN MEDICINE | Facility: CLINIC | Age: 22
End: 2022-12-13
Payer: MEDICAID

## 2023-01-10 ENCOUNTER — TELEPHONE (OUTPATIENT)
Dept: PAIN MEDICINE | Facility: CLINIC | Age: 23
End: 2023-01-10
Payer: MEDICAID

## 2023-01-13 ENCOUNTER — HOSPITAL ENCOUNTER (OUTPATIENT)
Dept: RADIOLOGY | Facility: HOSPITAL | Age: 23
Discharge: HOME | End: 2023-01-13
Attending: PHYSICIAN ASSISTANT
Payer: MEDICAID

## 2023-01-13 DIAGNOSIS — M53.3 SI (SACROILIAC) PAIN: ICD-10-CM

## 2023-01-13 PROCEDURE — A9585 GADOBUTROL INJECTION: HCPCS | Performed by: PHYSICIAN ASSISTANT

## 2023-01-13 PROCEDURE — 72197 MRI PELVIS W/O & W/DYE: CPT | Mod: 26,,, | Performed by: RADIOLOGY

## 2023-01-13 PROCEDURE — 72197 MRI PELVIS W/O & W/DYE: CPT | Mod: TC

## 2023-01-13 PROCEDURE — 72197 MRI SACROILIAC JOINTS WITH AND WITHOUT: ICD-10-PCS | Mod: 26,,, | Performed by: RADIOLOGY

## 2023-01-13 PROCEDURE — 25500020 PHARM REV CODE 255: Performed by: PHYSICIAN ASSISTANT

## 2023-01-13 RX ORDER — GADOBUTROL 604.72 MG/ML
5 INJECTION INTRAVENOUS
Status: COMPLETED | OUTPATIENT
Start: 2023-01-13 | End: 2023-01-13

## 2023-01-13 RX ADMIN — GADOBUTROL 5 ML: 604.72 INJECTION INTRAVENOUS at 11:01

## 2023-01-17 ENCOUNTER — TELEPHONE (OUTPATIENT)
Dept: RHEUMATOLOGY | Facility: CLINIC | Age: 23
End: 2023-01-17
Payer: MEDICAID

## 2023-01-19 ENCOUNTER — OFFICE VISIT (OUTPATIENT)
Dept: RHEUMATOLOGY | Facility: CLINIC | Age: 23
End: 2023-01-19
Payer: MEDICAID

## 2023-01-19 VITALS
WEIGHT: 123.44 LBS | BODY MASS INDEX: 20.57 KG/M2 | RESPIRATION RATE: 17 BRPM | HEIGHT: 65 IN | SYSTOLIC BLOOD PRESSURE: 106 MMHG | DIASTOLIC BLOOD PRESSURE: 65 MMHG | HEART RATE: 88 BPM

## 2023-01-19 DIAGNOSIS — F32.A DEPRESSION, UNSPECIFIED DEPRESSION TYPE: ICD-10-CM

## 2023-01-19 DIAGNOSIS — M53.3 SI (SACROILIAC) PAIN: ICD-10-CM

## 2023-01-19 DIAGNOSIS — M54.6 PAIN IN THORACIC SPINE: Primary | ICD-10-CM

## 2023-01-19 PROCEDURE — 99213 OFFICE O/P EST LOW 20 MIN: CPT | Mod: PBBFAC | Performed by: PHYSICIAN ASSISTANT

## 2023-01-19 PROCEDURE — 1159F PR MEDICATION LIST DOCUMENTED IN MEDICAL RECORD: ICD-10-PCS | Mod: CPTII,,, | Performed by: PHYSICIAN ASSISTANT

## 2023-01-19 PROCEDURE — 1159F MED LIST DOCD IN RCRD: CPT | Mod: CPTII,,, | Performed by: PHYSICIAN ASSISTANT

## 2023-01-19 PROCEDURE — 3078F PR MOST RECENT DIASTOLIC BLOOD PRESSURE < 80 MM HG: ICD-10-PCS | Mod: CPTII,,, | Performed by: PHYSICIAN ASSISTANT

## 2023-01-19 PROCEDURE — 3074F PR MOST RECENT SYSTOLIC BLOOD PRESSURE < 130 MM HG: ICD-10-PCS | Mod: CPTII,,, | Performed by: PHYSICIAN ASSISTANT

## 2023-01-19 PROCEDURE — 3008F BODY MASS INDEX DOCD: CPT | Mod: CPTII,,, | Performed by: PHYSICIAN ASSISTANT

## 2023-01-19 PROCEDURE — 99213 PR OFFICE/OUTPT VISIT, EST, LEVL III, 20-29 MIN: ICD-10-PCS | Mod: S$PBB,,, | Performed by: PHYSICIAN ASSISTANT

## 2023-01-19 PROCEDURE — 3008F PR BODY MASS INDEX (BMI) DOCUMENTED: ICD-10-PCS | Mod: CPTII,,, | Performed by: PHYSICIAN ASSISTANT

## 2023-01-19 PROCEDURE — 3078F DIAST BP <80 MM HG: CPT | Mod: CPTII,,, | Performed by: PHYSICIAN ASSISTANT

## 2023-01-19 PROCEDURE — 1160F RVW MEDS BY RX/DR IN RCRD: CPT | Mod: CPTII,,, | Performed by: PHYSICIAN ASSISTANT

## 2023-01-19 PROCEDURE — 99999 PR PBB SHADOW E&M-EST. PATIENT-LVL III: CPT | Mod: PBBFAC,,, | Performed by: PHYSICIAN ASSISTANT

## 2023-01-19 PROCEDURE — 3074F SYST BP LT 130 MM HG: CPT | Mod: CPTII,,, | Performed by: PHYSICIAN ASSISTANT

## 2023-01-19 PROCEDURE — 99999 PR PBB SHADOW E&M-EST. PATIENT-LVL III: ICD-10-PCS | Mod: PBBFAC,,, | Performed by: PHYSICIAN ASSISTANT

## 2023-01-19 PROCEDURE — 99213 OFFICE O/P EST LOW 20 MIN: CPT | Mod: S$PBB,,, | Performed by: PHYSICIAN ASSISTANT

## 2023-01-19 PROCEDURE — 1160F PR REVIEW ALL MEDS BY PRESCRIBER/CLIN PHARMACIST DOCUMENTED: ICD-10-PCS | Mod: CPTII,,, | Performed by: PHYSICIAN ASSISTANT

## 2023-01-19 RX ORDER — ATENOLOL 50 MG/1
50 TABLET ORAL DAILY
COMMUNITY
End: 2023-03-10 | Stop reason: SDUPTHER

## 2023-01-19 RX ORDER — MIRTAZAPINE 30 MG/1
30 TABLET, FILM COATED ORAL NIGHTLY
COMMUNITY
End: 2023-07-07

## 2023-01-19 RX ORDER — ESCITALOPRAM OXALATE 10 MG/1
10 TABLET ORAL DAILY
COMMUNITY
End: 2023-05-17

## 2023-01-19 NOTE — Clinical Note
She sees you in a cpl weeks.  No sign of spondyloarthopathy.  Releasing her to prn with me.  Please feel free to reconsult in future if needed. Tory Caballero PA-C [facility]

## 2023-01-19 NOTE — PROGRESS NOTES
Subjective:      Patient ID: Marilee Dumas is a 22 y.o. female.    Chief Complaint: Pain (Pain in upper back and neck area)        HPI   Marilee Dumas  is a 22 y.o. female who is here for follow-up on fibromyalgia.  She has undergone physical therapy as well as multiple medications.  She has had injections and interventional procedures for symptomatic management by Dr. Glass.  Nothing seems to help her pain.  She has failed tizanidine, Cymbalta, Celebrex, injections, physical therapy, Flexeril, Tylenol, OTC NSAIDs.  Currently on Elavil 25 mg q.h.s. which does help her sleep.  She hasn't improved with tizanidine, Flexeril, nor Robaxin.  I started her on gabapentin 300 mg.  Last visit we increased to t.i.d..  She notes some improvement.       Pain is concentrated in the upper back.  It has been going on for about 5 years now.  It started getting a lot worse about 2 years ago.  It is interfering with her functional level.  She denies radiating symptoms.  She has minimal pain in the lower back.  She does have some SI joint pain but the vast majority of her pain is in the upper thoracic region.  Pain follows a mixed pattern.  She has mechanical pain that worsens after about an hour of being active.  However pain overall is much worse at the end of the day when inactive.      She has had SI joint x-rays which were essentially normal.  MRI si joints and t-spine are unremarkable.    Patient denies fevers, chills, photosensitivity, eye pain, shortness of breath, chest pain, hematuria, blood in the stool, rash, sicca symptoms, raynauds, finger ulcerations.  Rheumatologic systems otherwise negative.    Serologies/Labs:  Neg AMALIA  Current Treatment:  Flexeril  Gabapentin 300 mg bid  Lexapro 10 mg  Previous Treatment:   Tizanidine  Cymbalta  Celebrex  Mobic  Iburprofen  Interventional injections by pain management  Physical therapy  Elavil - now on remeron      Current Outpatient Medications:     diazePAM (VALIUM) 5 MG  "tablet, 1 PO one hr prior to mri.  Repeat once 30 min prior to mri if needed, Disp: 2 tablet, Rfl: 0    gabapentin (NEURONTIN) 300 MG capsule, Take 1 capsule (300 mg total) by mouth 3 (three) times daily. Do not stop abruptly., Disp: 90 capsule, Rfl: 3    naltrexone capsule, Take 1 capsule (4.5 mg total) by mouth every evening., Disp: 90 capsule, Rfl: 1    ondansetron (ZOFRAN-ODT) 4 MG TbDL, Take 1 tablet (4 mg total) by mouth every 6 (six) hours as needed., Disp: 15 tablet, Rfl: 0    albuterol (PROVENTIL/VENTOLIN HFA) 90 mcg/actuation inhaler, Inhale 1-2 puffs into the lungs every 6 (six) hours as needed for Wheezing., Disp: 8 g, Rfl: 0    amitriptyline (ELAVIL) 50 MG tablet, Take 1 tablet (50 mg total) by mouth nightly as needed for Insomnia. (Patient not taking: Reported on 1/19/2023), Disp: 30 tablet, Rfl: 1    atenoloL (TENORMIN) 50 MG tablet, Take 50 mg by mouth once daily., Disp: , Rfl:     EScitalopram oxalate (LEXAPRO) 10 MG tablet, Take 10 mg by mouth once daily., Disp: , Rfl:     mirtazapine (REMERON) 30 MG tablet, Take 30 mg by mouth every evening., Disp: , Rfl:   No current facility-administered medications for this visit.    Facility-Administered Medications Ordered in Other Visits:     ondansetron injection 4 mg, 4 mg, Intravenous, Once PRN, Roland Glass MD    Past Medical History:   Diagnosis Date    Neck pain      History reviewed. No pertinent family history.  Social History     Socioeconomic History    Marital status: Single   Tobacco Use    Smoking status: Every Day     Types: Cigarettes    Smokeless tobacco: Former   Substance and Sexual Activity    Alcohol use: Never    Drug use: Yes     Types: Marijuana    Sexual activity: Yes     Partners: Female     Review of patient's allergies indicates:   Allergen Reactions    Penicillins        Objective:   /65   Pulse 88   Resp 17   Ht 5' 5" (1.651 m)   Wt 56 kg (123 lb 7.3 oz)   BMI 20.54 kg/m²   Immunization History   Administered " Date(s) Administered    DTaP 2002, 2002, 2002, 06/12/2003, 11/21/2006    HIB 2002, 06/12/2003    Hep B / HiB 2002, 2002    Hepatitis A, Pediatric/Adolescent, 2 Dose 06/10/2015, 07/12/2016    Hepatitis B, Pediatric/Adolescent 2002    IPV 2002, 2002, 2002, 11/21/2006    Influenza 12/02/2011    MMR 06/12/2003    MMRV 11/21/2006    Meningococcal Conjugate (MCV4P) 09/12/2013, 06/04/2018    Pneumococcal Conjugate - 7 Valent 2002, 2002, 06/12/2003    Tdap 09/12/2013    Varicella 08/14/2009       Physical Exam   Constitutional: She is oriented to person, place, and time. No distress.   HENT:   Head: Normocephalic and atraumatic.   Pulmonary/Chest: Effort normal.   Abdominal: She exhibits no distension.   Musculoskeletal:         General: No swelling or tenderness. Normal range of motion.      Cervical back: Normal range of motion.   Lymphadenopathy:     She has no cervical adenopathy.   Neurological: She is alert and oriented to person, place, and time.   Skin: Skin is warm and dry. No rash noted.   Psychiatric: Mood normal.   Nursing note and vitals reviewed.   10/18 tender trigger points  Minimal ttp SI joints      No results found for this or any previous visit (from the past 672 hour(s)).  Recent Results (from the past 2016 hour(s))   Sedimentation rate    Collection Time: 12/09/22 12:11 PM   Result Value Ref Range    Sed Rate 14 0 - 36 mm/Hr   C-Reactive Protein    Collection Time: 12/09/22 12:11 PM   Result Value Ref Range    CRP 0.6 0.0 - 8.2 mg/L          No results found for: TBGOLDPLUS   No results found for: HAV, HEPAIGM, HEPBIGM, HEPBCAB, HBEAG, HEPCAB     Imaging  I have personally reviewed images and reports as below.  I agree with the interpretation.  MRI Sacroiliac Joints W WO Contrast  Narrative: EXAMINATION:  MRI SACROILIAC JOINTS WITH AND WITHOUT    CLINICAL HISTORY:  Sacrococcygeal disorders, not elsewhere  classified    TECHNIQUE:  Multiplanar, multisequence MR imaging was performed through the sacroiliac joints before and after the intravenous administration of 5 cc Gadavist contrast.    COMPARISON:  Sacral radiograph on 09/16/2022    FINDINGS:  Very mild subchondral edema along the sacral side of the right sacroiliac joint, superior articular margin.  No articular erosions or ankylosis.  No abnormal enhancement following the intravenous administration of contrast.  The joint spaces are preserved.    Free pelvic fluid, likely physiologic.  Multiple small bilateral ovarian follicles.    There is mild intramuscular edema demonstrated in the left gluteus eve without a fluid collection.  Impression: No MR evidence of sacroiliitis.  Faint nonspecific subchondral edema at the superior margin of the right sacroiliac joint, sacral side.    Grade 1 left gluteus eve muscle strain.    Electronically signed by: Cecilio Allen MD  Date:    01/13/2023  Time:    14:56    Next appt: 09/21/2022 at 01:20 PM in Pain Medicine (Ana Carbone PA-C)     Dx: Chronic bilateral thoracic back pain     0 Result Notes  Details    Reading Physician Reading Date Result Priority   Jerzy Stevenson MD  [phone] 5/6/2022 Routine     Narrative & Impression  EXAMINATION:  XR THORACIC SPINE 4 OR MORE VIEWS     CLINICAL HISTORY:  Pain in thoracic spine     TECHNIQUE:  AP, lateral, and bilateral oblique views of the thoracic spine were obtained.     COMPARISON:  None     FINDINGS:  There is mild levoscoliosis of the lower thoracic spine.  Vertebral body heights are well maintained.  No spondylolisthesis demonstrated.  Intervertebral disc heights are appear preserved.  No acute fractures or subluxations visualized.     Impression:     As above.        Electronically signed by: Jerzy Stevenson MD  Date:                                            05/06/2022  Time:                                           14:24     MRI Lumbar Spine  Without Contrast  Order: [id number]  Status: Final result     Visible to patient: Yes (seen)     Next appt: 09/21/2022 at 01:20 PM in Pain Medicine (Ana Carbone PA-C)     Dx: Dorsalgia, unspecified; Scoliosis, un...     0 Result Notes  Details    Reading Physician Reading Date Result Priority   Jerzy Stevenson MD  [phone] 9/10/2021 Routine     Narrative & Impression  EXAMINATION:  MRI LUMBAR SPINE WITHOUT CONTRAST     CLINICAL HISTORY:  Back pain or radiculopathy, > 6 wks; Dorsalgia, unspecified     TECHNIQUE:  Multiplanar, multisequence MR images were acquired from the thoracolumbar junction to the sacrum without the administration of contrast.     COMPARISON:  None.     FINDINGS:  Alignment: There is slight rightward rotation of multiple upper lumbar vertebral bodies, likely related to mild thoracic scoliosis.     Vertebrae: Normal marrow signal. No fracture.     Discs: Normal height and signal.     Cord: Normal.  Conus terminates at L1     Degenerative findings:     No significant disc bulge, spinal canal stenosis, or neural foraminal narrowing.     Paraspinal muscles & soft tissues: Unremarkable.     Impression:     As above.  No acute findings.        Electronically signed by: Jerzy Stevenson MD  Date:                                            09/10/2021  Time:                                           12:22       Assessment:     1. Pain in thoracic spine    2. SI (sacroiliac) pain    3. Depression, unspecified depression type                Plan:     Marilee was seen today for pain.    Diagnoses and all orders for this visit:    Pain in thoracic spine    SI (sacroiliac) pain    Depression, unspecified depression type          Back pain  Mainly upper back  Mixed pattern\  MRI T-spine  Essentially normal  Failed Celebrex, tylenol, OTC NSAIDs  Failed PT  Failed bilateral med branch block at T3, T4, and T5  Failed tizanidine, and robaxin and flexeril as well  F/u w Dr. Glass as scheduled  Wean  off gabapentin (ineffective)  300 mg once daily x 1 week  Then every other day x 2 week  Then stop  Minimal SI jt pain  Xray SI jts - no erosions  MRI negative  Repeat ESR/CRP have been WNL  HLA B27 negative  Depression/anxiety  Better today than last visit  Working to establish care w psych - set up on 1/23/23 w [facility]  No suicidal thoughts or ideations  No thoughts of harm  Return to clinic: prn    Follow up if symptoms worsen or fail to improve.    The patient understands, chooses and consents to this plan and accepts all the risks which include but are not limited to the risks mentioned above.     Disclaimer: This note was prepared using a voice recognition system and is likely to have sound alike errors within the text.

## 2023-01-30 ENCOUNTER — TELEPHONE (OUTPATIENT)
Dept: PAIN MEDICINE | Facility: CLINIC | Age: 23
End: 2023-01-30
Payer: MEDICAID

## 2023-01-30 NOTE — TELEPHONE ENCOUNTER
----- Message from Kary Kohler sent at 1/30/2023  3:06 PM CST -----  States she has a Virutal Visit on 2/3 on Friday. She would like to change it to an in office appt. Nothing coming up on the schedule for in office appt. Please call pt [phone]. Thank you

## 2023-02-03 ENCOUNTER — OFFICE VISIT (OUTPATIENT)
Dept: PAIN MEDICINE | Facility: CLINIC | Age: 23
End: 2023-02-03
Payer: MEDICAID

## 2023-02-03 DIAGNOSIS — F39 MOOD DISORDER: ICD-10-CM

## 2023-02-03 DIAGNOSIS — M79.7 FIBROMYALGIA: Primary | ICD-10-CM

## 2023-02-03 PROCEDURE — 99213 OFFICE O/P EST LOW 20 MIN: CPT | Mod: 95,,, | Performed by: PHYSICAL MEDICINE & REHABILITATION

## 2023-02-03 PROCEDURE — 1159F PR MEDICATION LIST DOCUMENTED IN MEDICAL RECORD: ICD-10-PCS | Mod: CPTII,95,, | Performed by: PHYSICAL MEDICINE & REHABILITATION

## 2023-02-03 PROCEDURE — 1160F PR REVIEW ALL MEDS BY PRESCRIBER/CLIN PHARMACIST DOCUMENTED: ICD-10-PCS | Mod: CPTII,95,, | Performed by: PHYSICAL MEDICINE & REHABILITATION

## 2023-02-03 PROCEDURE — 1160F RVW MEDS BY RX/DR IN RCRD: CPT | Mod: CPTII,95,, | Performed by: PHYSICAL MEDICINE & REHABILITATION

## 2023-02-03 PROCEDURE — 99213 PR OFFICE/OUTPT VISIT, EST, LEVL III, 20-29 MIN: ICD-10-PCS | Mod: 95,,, | Performed by: PHYSICAL MEDICINE & REHABILITATION

## 2023-02-03 PROCEDURE — 1159F MED LIST DOCD IN RCRD: CPT | Mod: CPTII,95,, | Performed by: PHYSICAL MEDICINE & REHABILITATION
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
## 2023-02-08 ENCOUNTER — PATIENT MESSAGE (OUTPATIENT)
Dept: PAIN MEDICINE | Facility: CLINIC | Age: 23
End: 2023-02-08
Payer: MEDICAID

## 2023-02-10 ENCOUNTER — PATIENT MESSAGE (OUTPATIENT)
Dept: PAIN MEDICINE | Facility: CLINIC | Age: 23
End: 2023-02-10
Payer: MEDICAID

## 2023-03-02 ENCOUNTER — TELEPHONE (OUTPATIENT)
Dept: PAIN MEDICINE | Facility: CLINIC | Age: 23
End: 2023-03-02
Payer: MEDICAID

## 2023-03-02 NOTE — TELEPHONE ENCOUNTER
----- Message from Marie Krishnamurthy sent at 3/2/2023  1:51 PM CST -----  Contact: Marilee Burnette called in to ask if her injection will be given at the 3/10 appt. She's in a lot of pain. Please call her back at [phone].     Thanks  TS

## 2023-03-10 ENCOUNTER — OFFICE VISIT (OUTPATIENT)
Dept: PAIN MEDICINE | Facility: CLINIC | Age: 23
End: 2023-03-10
Payer: MEDICAID

## 2023-03-10 VITALS
DIASTOLIC BLOOD PRESSURE: 78 MMHG | BODY MASS INDEX: 19.99 KG/M2 | WEIGHT: 120 LBS | HEART RATE: 87 BPM | HEIGHT: 65 IN | RESPIRATION RATE: 17 BRPM | SYSTOLIC BLOOD PRESSURE: 115 MMHG

## 2023-03-10 DIAGNOSIS — F39 MOOD DISORDER: ICD-10-CM

## 2023-03-10 DIAGNOSIS — M79.7 FIBROMYALGIA: ICD-10-CM

## 2023-03-10 DIAGNOSIS — G89.4 CHRONIC PAIN DISORDER: ICD-10-CM

## 2023-03-10 DIAGNOSIS — M79.12 MYALGIA OF AUXILIARY MUSCLES, HEAD AND NECK: Primary | ICD-10-CM

## 2023-03-10 PROCEDURE — 3008F PR BODY MASS INDEX (BMI) DOCUMENTED: ICD-10-PCS | Mod: CPTII,,, | Performed by: PHYSICAL MEDICINE & REHABILITATION

## 2023-03-10 PROCEDURE — 3008F BODY MASS INDEX DOCD: CPT | Mod: CPTII,,, | Performed by: PHYSICAL MEDICINE & REHABILITATION

## 2023-03-10 PROCEDURE — 20552 NJX 1/MLT TRIGGER POINT 1/2: CPT | Mod: S$PBB,,, | Performed by: PHYSICAL MEDICINE & REHABILITATION

## 2023-03-10 PROCEDURE — 1159F PR MEDICATION LIST DOCUMENTED IN MEDICAL RECORD: ICD-10-PCS | Mod: CPTII,,, | Performed by: PHYSICAL MEDICINE & REHABILITATION

## 2023-03-10 PROCEDURE — 1159F MED LIST DOCD IN RCRD: CPT | Mod: CPTII,,, | Performed by: PHYSICAL MEDICINE & REHABILITATION

## 2023-03-10 PROCEDURE — 3078F PR MOST RECENT DIASTOLIC BLOOD PRESSURE < 80 MM HG: ICD-10-PCS | Mod: CPTII,,, | Performed by: PHYSICAL MEDICINE & REHABILITATION

## 2023-03-10 PROCEDURE — 3078F DIAST BP <80 MM HG: CPT | Mod: CPTII,,, | Performed by: PHYSICAL MEDICINE & REHABILITATION

## 2023-03-10 PROCEDURE — 20552 NJX 1/MLT TRIGGER POINT 1/2: CPT | Mod: 50,PBBFAC,PO | Performed by: PHYSICAL MEDICINE & REHABILITATION

## 2023-03-10 PROCEDURE — 3074F PR MOST RECENT SYSTOLIC BLOOD PRESSURE < 130 MM HG: ICD-10-PCS | Mod: CPTII,,, | Performed by: PHYSICAL MEDICINE & REHABILITATION

## 2023-03-10 PROCEDURE — 99214 PR OFFICE/OUTPT VISIT, EST, LEVL IV, 30-39 MIN: ICD-10-PCS | Mod: 25,S$PBB,, | Performed by: PHYSICAL MEDICINE & REHABILITATION

## 2023-03-10 PROCEDURE — 99213 OFFICE O/P EST LOW 20 MIN: CPT | Mod: PBBFAC,PO,25 | Performed by: PHYSICAL MEDICINE & REHABILITATION

## 2023-03-10 PROCEDURE — 99214 OFFICE O/P EST MOD 30 MIN: CPT | Mod: 25,S$PBB,, | Performed by: PHYSICAL MEDICINE & REHABILITATION

## 2023-03-10 PROCEDURE — 3074F SYST BP LT 130 MM HG: CPT | Mod: CPTII,,, | Performed by: PHYSICAL MEDICINE & REHABILITATION

## 2023-03-10 PROCEDURE — 99999 PR PBB SHADOW E&M-EST. PATIENT-LVL III: ICD-10-PCS | Mod: PBBFAC,,, | Performed by: PHYSICAL MEDICINE & REHABILITATION

## 2023-03-10 PROCEDURE — 20552 PR INJECT TRIGGER POINT, 1 OR 2: ICD-10-PCS | Mod: S$PBB,,, | Performed by: PHYSICAL MEDICINE & REHABILITATION

## 2023-03-10 PROCEDURE — 99999 PR PBB SHADOW E&M-EST. PATIENT-LVL III: CPT | Mod: PBBFAC,,, | Performed by: PHYSICAL MEDICINE & REHABILITATION

## 2023-03-10 RX ORDER — ONDANSETRON 4 MG/1
4 TABLET, FILM COATED ORAL EVERY 8 HOURS PRN
COMMUNITY

## 2023-03-10 RX ORDER — METHYLPREDNISOLONE ACETATE 40 MG/ML
40 INJECTION, SUSPENSION INTRA-ARTICULAR; INTRALESIONAL; INTRAMUSCULAR; SOFT TISSUE
Status: COMPLETED | OUTPATIENT
Start: 2023-03-10 | End: 2023-03-10

## 2023-03-10 RX ORDER — PREGABALIN 75 MG/1
75 CAPSULE ORAL
COMMUNITY
Start: 2023-03-03 | End: 2023-05-19

## 2023-03-10 RX ADMIN — METHYLPREDNISOLONE ACETATE 40 MG: 40 INJECTION, SUSPENSION INTRA-ARTICULAR; INTRALESIONAL; INTRAMUSCULAR; SOFT TISSUE at 02:03
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
## 2023-03-13 ENCOUNTER — PATIENT MESSAGE (OUTPATIENT)
Dept: PAIN MEDICINE | Facility: CLINIC | Age: 23
End: 2023-03-13
Payer: MEDICAID

## 2023-03-17 ENCOUNTER — TELEPHONE (OUTPATIENT)
Dept: PAIN MEDICINE | Facility: CLINIC | Age: 23
End: 2023-03-17
Payer: MEDICAID

## 2023-03-17 NOTE — TELEPHONE ENCOUNTER
Returned pt call. Trigger point injection did not help with her pain. States that today she is 10/10. States that she is barely able to sleep due to the pain and nausea. Informed pt that I will send this information over to Dr. Glass.

## 2023-03-17 NOTE — TELEPHONE ENCOUNTER
----- Message from Kasey Diego sent at 3/17/2023  9:01 AM CDT -----  Contact: Patient  Marilee GREENWOOD Dumas would like a call back at [phone], in regards to following up on the injections she received.

## 2023-03-22 ENCOUNTER — TELEPHONE (OUTPATIENT)
Dept: PAIN MEDICINE | Facility: CLINIC | Age: 23
End: 2023-03-22
Payer: MEDICAID

## 2023-03-22 NOTE — TELEPHONE ENCOUNTER
Returned pt call. Pt states that during her appointment with her psychiatrist, she was told she had DDD (degenerative disc disease). Pt stated that she was never informed prior of this diagnosis. Informed pt that Dr. Glass is seeing her for a diagnosis of Myalgia of auxiliary muscles, head and neck; Fibromyalgia and Chronic pain disorder . Pt agrees that this is what she see Dr. Glass for. Pt states she will talk with psychiatrist regarding DDD diagnosis. All questions answered.

## 2023-04-05 ENCOUNTER — PATIENT MESSAGE (OUTPATIENT)
Dept: PAIN MEDICINE | Facility: CLINIC | Age: 23
End: 2023-04-05
Payer: MEDICAID

## 2023-04-13 ENCOUNTER — TELEPHONE (OUTPATIENT)
Dept: PAIN MEDICINE | Facility: CLINIC | Age: 23
End: 2023-04-13
Payer: MEDICAID

## 2023-04-14 ENCOUNTER — OFFICE VISIT (OUTPATIENT)
Dept: PAIN MEDICINE | Facility: CLINIC | Age: 23
End: 2023-04-14
Payer: MEDICAID

## 2023-04-14 DIAGNOSIS — F41.9 ANXIETY: ICD-10-CM

## 2023-04-14 DIAGNOSIS — M79.12 MYALGIA OF AUXILIARY MUSCLES, HEAD AND NECK: ICD-10-CM

## 2023-04-14 DIAGNOSIS — G89.4 CHRONIC PAIN SYNDROME: Primary | ICD-10-CM

## 2023-04-14 PROCEDURE — 1160F RVW MEDS BY RX/DR IN RCRD: CPT | Mod: CPTII,95,, | Performed by: PHYSICAL MEDICINE & REHABILITATION

## 2023-04-14 PROCEDURE — 99213 OFFICE O/P EST LOW 20 MIN: CPT | Mod: 95,,, | Performed by: PHYSICAL MEDICINE & REHABILITATION

## 2023-04-14 PROCEDURE — 1160F PR REVIEW ALL MEDS BY PRESCRIBER/CLIN PHARMACIST DOCUMENTED: ICD-10-PCS | Mod: CPTII,95,, | Performed by: PHYSICAL MEDICINE & REHABILITATION

## 2023-04-14 PROCEDURE — 1159F MED LIST DOCD IN RCRD: CPT | Mod: CPTII,95,, | Performed by: PHYSICAL MEDICINE & REHABILITATION

## 2023-04-14 PROCEDURE — 1159F PR MEDICATION LIST DOCUMENTED IN MEDICAL RECORD: ICD-10-PCS | Mod: CPTII,95,, | Performed by: PHYSICAL MEDICINE & REHABILITATION

## 2023-04-14 PROCEDURE — 99213 PR OFFICE/OUTPT VISIT, EST, LEVL III, 20-29 MIN: ICD-10-PCS | Mod: 95,,, | Performed by: PHYSICAL MEDICINE & REHABILITATION
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
## 2023-05-02 ENCOUNTER — HOSPITAL ENCOUNTER (EMERGENCY)
Facility: HOSPITAL | Age: 23
Discharge: HOME | End: 2023-05-02
Attending: FAMILY MEDICINE
Payer: MEDICAID

## 2023-05-02 VITALS
RESPIRATION RATE: 17 BRPM | OXYGEN SATURATION: 96 % | TEMPERATURE: 97 F | HEIGHT: 65 IN | SYSTOLIC BLOOD PRESSURE: 126 MMHG | DIASTOLIC BLOOD PRESSURE: 72 MMHG | BODY MASS INDEX: 19.66 KG/M2 | HEART RATE: 80 BPM | WEIGHT: 118 LBS

## 2023-05-02 DIAGNOSIS — K31.84 GASTROPARESIS: Primary | ICD-10-CM

## 2023-05-02 DIAGNOSIS — G89.4 CHRONIC PAIN SYNDROME: ICD-10-CM

## 2023-05-02 DIAGNOSIS — R11.15 CYCLICAL VOMITING: ICD-10-CM

## 2023-05-02 LAB
ALBUMIN SERPL-MCNC: 4.6 G/DL (ref 3.4–5)
ALBUMIN/GLOB SERPL: 1.6 RATIO
ALP SERPL-CCNC: 49 UNIT/L (ref 50–144)
ALT SERPL-CCNC: 18 UNIT/L (ref 1–45)
ANION GAP SERPL CALC-SCNC: 9 MEQ/L (ref 2–13)
APPEARANCE UR: CLEAR
AST SERPL-CCNC: 32 UNIT/L (ref 14–36)
B-HCG SERPL QL: NEGATIVE
BASOPHILS # BLD AUTO: 0.03 X10(3)/MCL (ref 0.01–0.08)
BASOPHILS NFR BLD AUTO: 0.2 % (ref 0.1–1.2)
BILIRUB UR QL STRIP.AUTO: ABNORMAL MG/DL
BILIRUBIN DIRECT+TOT PNL SERPL-MCNC: 0.6 MG/DL (ref 0–1)
BUN SERPL-MCNC: 10 MG/DL (ref 7–20)
CALCIUM SERPL-MCNC: 9.1 MG/DL (ref 8.4–10.2)
CHLORIDE SERPL-SCNC: 105 MMOL/L (ref 98–110)
CO2 SERPL-SCNC: 24 MMOL/L (ref 21–32)
COLOR UR AUTO: YELLOW
CREAT SERPL-MCNC: 0.72 MG/DL (ref 0.66–1.25)
CREAT/UREA NIT SERPL: 14 (ref 12–20)
EOSINOPHIL # BLD AUTO: 0.02 X10(3)/MCL (ref 0.04–0.36)
EOSINOPHIL NFR BLD AUTO: 0.2 % (ref 0.7–7)
ERYTHROCYTE [DISTWIDTH] IN BLOOD BY AUTOMATED COUNT: 11.5 % (ref 11–14.5)
GFR SERPLBLD CREATININE-BSD FMLA CKD-EPI: >90 MLS/MIN/1.73/M2
GLOBULIN SER-MCNC: 2.9 GM/DL (ref 2–3.9)
GLUCOSE SERPL-MCNC: 75 MG/DL (ref 70–115)
GLUCOSE UR QL STRIP.AUTO: NEGATIVE MG/DL
HCT VFR BLD AUTO: 35.5 % (ref 36–48)
HGB BLD-MCNC: 12.2 G/DL (ref 11.8–16)
IMM GRANULOCYTES # BLD AUTO: 0.05 X10(3)/MCL (ref 0–0.03)
IMM GRANULOCYTES NFR BLD AUTO: 0.4 % (ref 0–0.5)
KETONES UR QL STRIP.AUTO: >=80 MG/DL
LEUKOCYTE ESTERASE UR QL STRIP.AUTO: NEGATIVE UNIT/L
LIPASE SERPL-CCNC: 51 U/L (ref 23–300)
LYMPHOCYTES # BLD AUTO: 3.51 X10(3)/MCL (ref 1.16–3.74)
LYMPHOCYTES NFR BLD AUTO: 28.8 % (ref 20–55)
MCH RBC QN AUTO: 30.8 PG (ref 27–34)
MCHC RBC AUTO-ENTMCNC: 34.4 G/DL (ref 31–37)
MCV RBC AUTO: 89.6 FL (ref 79–99)
MONOCYTES # BLD AUTO: 0.57 X10(3)/MCL (ref 0.24–0.36)
MONOCYTES NFR BLD AUTO: 4.7 % (ref 4.7–12.5)
NEUTROPHILS # BLD AUTO: 8 X10(3)/MCL (ref 1.56–6.13)
NEUTROPHILS NFR BLD AUTO: 65.7 % (ref 37–73)
NITRITE UR QL STRIP.AUTO: NEGATIVE
NRBC BLD AUTO-RTO: 0 % (ref 0–1)
PH UR STRIP.AUTO: 6 [PH]
PLATELET # BLD AUTO: 252 X10(3)/MCL (ref 140–371)
PMV BLD AUTO: 8.9 FL (ref 9.4–12.4)
POTASSIUM SERPL-SCNC: 3.5 MMOL/L (ref 3.5–5.1)
PROT SERPL-MCNC: 7.5 GM/DL (ref 6.3–8.2)
PROT UR QL STRIP.AUTO: NEGATIVE MG/DL
RBC # BLD AUTO: 3.96 X10(6)/MCL (ref 4–5.1)
RBC UR QL AUTO: NEGATIVE UNIT/L
SODIUM SERPL-SCNC: 138 MMOL/L (ref 135–145)
SP GR UR STRIP.AUTO: 1.02
UROBILINOGEN UR STRIP-ACNC: 0.2 MG/DL
WBC # SPEC AUTO: 12.18 X10(3)/MCL (ref 4–11.5)

## 2023-05-02 PROCEDURE — 63600175 PHARM REV CODE 636 W HCPCS: Performed by: NURSE PRACTITIONER

## 2023-05-02 PROCEDURE — 99285 EMERGENCY DEPT VISIT HI MDM: CPT | Mod: 25

## 2023-05-02 PROCEDURE — 81003 URINALYSIS AUTO W/O SCOPE: CPT | Performed by: NURSE PRACTITIONER

## 2023-05-02 PROCEDURE — 83690 ASSAY OF LIPASE: CPT | Performed by: NURSE PRACTITIONER

## 2023-05-02 PROCEDURE — 96372 THER/PROPH/DIAG INJ SC/IM: CPT | Performed by: NURSE PRACTITIONER

## 2023-05-02 PROCEDURE — 36415 COLL VENOUS BLD VENIPUNCTURE: CPT | Performed by: NURSE PRACTITIONER

## 2023-05-02 PROCEDURE — 80053 COMPREHEN METABOLIC PANEL: CPT | Performed by: NURSE PRACTITIONER

## 2023-05-02 PROCEDURE — 85025 COMPLETE CBC W/AUTO DIFF WBC: CPT | Performed by: NURSE PRACTITIONER

## 2023-05-02 PROCEDURE — 81025 URINE PREGNANCY TEST: CPT | Performed by: NURSE PRACTITIONER

## 2023-05-02 RX ORDER — PROMETHAZINE HYDROCHLORIDE 25 MG/ML
12.5 INJECTION, SOLUTION INTRAMUSCULAR; INTRAVENOUS
Status: COMPLETED | OUTPATIENT
Start: 2023-05-02 | End: 2023-05-02

## 2023-05-02 RX ORDER — DIPHENHYDRAMINE HYDROCHLORIDE 50 MG/ML
25 INJECTION INTRAMUSCULAR; INTRAVENOUS
Status: COMPLETED | OUTPATIENT
Start: 2023-05-02 | End: 2023-05-02

## 2023-05-02 RX ADMIN — DIPHENHYDRAMINE HYDROCHLORIDE 25 MG: 50 INJECTION INTRAMUSCULAR; INTRAVENOUS at 08:05

## 2023-05-02 RX ADMIN — PROMETHAZINE HYDROCHLORIDE 12.5 MG: 25 INJECTION INTRAMUSCULAR; INTRAVENOUS at 08:05

## 2023-05-03 NOTE — ED PROVIDER NOTES
"Encounter Date: 5/2/2023       History     Chief Complaint   Patient presents with    Vomiting     AMB TO ED WITH C/O N/V SINCE 2AM THIS MORNING. ALSO C/O NECK AND UPPER BACK PAIN FOR YEARS. HX OF SCOLIOSIS      History of chronic gastritis associated with chronic pain of spine  Patient states was under treatment with Pain Management Dr. Glass was recently told she had no other options  "I have been to multiple ED and providers without any relief of my symptoms"  Zofran from home not working    The history is provided by the patient and a friend. No  was used.   Review of patient's allergies indicates:   Allergen Reactions    Penicillins      Past Medical History:   Diagnosis Date    Back pain     Neck pain     Scoliosis      Past Surgical History:   Procedure Laterality Date    INJECTION OF ANESTHETIC AGENT AROUND MEDIAL BRANCH NERVES INNERVATING CERVICAL FACET JOINT Left 5/19/2022    Procedure: bilateral T3-5 diagnostic MBB RN IV Sedation;  Surgeon: Roland Glass MD;  Location: [facility];  Service: Pain Management;  Laterality: Left;     History reviewed. No pertinent family history.  Social History     Tobacco Use    Smoking status: Every Day     Types: Vaping with nicotine    Smokeless tobacco: Former   Substance Use Topics    Alcohol use: Never    Drug use: Yes     Types: Marijuana     Comment: PRESCRIBED     Review of Systems   Gastrointestinal:  Positive for nausea and vomiting.   Musculoskeletal:  Positive for arthralgias, back pain, myalgias, neck pain and neck stiffness.   All other systems reviewed and are negative.    Physical Exam     Initial Vitals [05/02/23 1905]   BP Pulse Resp Temp SpO2   128/66 81 20 97.3 °F (36.3 °C) 97 %      MAP       --         Physical Exam    Nursing note and vitals reviewed.  Constitutional: She appears well-developed and well-nourished.   HENT:   Head: Normocephalic.   Eyes: EOM are normal. Pupils are equal, round, and reactive to light.   Neck: " Neck supple.   Normal range of motion.  Cardiovascular:  Normal rate and regular rhythm.           No murmur heard.  Pulmonary/Chest: Breath sounds normal. No respiratory distress.   Abdominal: Abdomen is soft.   Musculoskeletal:      Cervical back: Normal range of motion and neck supple.      Comments: Chronic low and mid back pain     Neurological: She is alert and oriented to person, place, and time. GCS score is 15. GCS eye subscore is 4. GCS verbal subscore is 5. GCS motor subscore is 6.   Skin: Skin is warm and dry. Capillary refill takes less than 2 seconds.   Psychiatric:   anxious       ED Course   Procedures  Labs Reviewed   URINALYSIS, REFLEX TO URINE CULTURE - Abnormal; Notable for the following components:       Result Value    Ketones, UA >=80 (*)     Bilirubin, UA Small (*)     All other components within normal limits    Narrative:      URINE STABILITY IS 2 HOURS AT ROOM TEMP OR    SIX HOURS REFRIGERATED. PERFORMING TESTING ON    SPECIMENS GREATER THAN THIS AGE MAY AFFECT THE    FOLLOWING TESTS:    PH          SPECIFIC GRAVITY           BLOOD    CLARITY     BILIRUBIN               UROBILINOGEN   COMPREHENSIVE METABOLIC PANEL - Abnormal; Notable for the following components:    Alkaline Phosphatase 49 (*)     All other components within normal limits   CBC WITH DIFFERENTIAL - Abnormal; Notable for the following components:    WBC 12.18 (*)     RBC 3.96 (*)     Hct 35.5 (*)     MPV 8.9 (*)     Eos % 0.2 (*)     Neut # 8.00 (*)     Mono # 0.57 (*)     Eos # 0.02 (*)     IG# 0.05 (*)     All other components within normal limits   HCG QUALITATIVE URINE - Normal   LIPASE - Normal   CBC W/ AUTO DIFFERENTIAL    Narrative:     The following orders were created for panel order CBC Auto Differential.  Procedure                               Abnormality         Status                     ---------                               -----------         ------                     CBC with Differential[[id number]]         Abnormal            Final result                 Please view results for these tests on the individual orders.          Imaging Results              CT Abdomen Pelvis  Without Contrast (Final result)  Result time 05/02/23 21:52:27      Final result by Truong Allen MD (05/02/23 21:52:27)                   Impression:        1.  No clinically significant abnormalities noted.    n/a    CATEGORY:n/a    The following dose reduction techniques are used for all CT at [facility]:    1.   Automated exposure control.    2.   Adjustment of the mA and/or kV according to patient size.    3.   Use of iterative reconstruction technique.      Electronically signed by: Truong Allen  Date:    05/02/2023  Time:    21:52               Narrative:      STUDY:CT SCAN OF THE ABDOMEN AND PELVIS WITHOUT INTRAVENOUS CONTRAST    CLINICAL HISTORY & TECHNIQUE:    Ramses Escalante, RT on 5/2/2023  9:36 PM    PROCEDURE:  CT ABD\PELVIS WO    ER PT    CLINICAL HX  X 1 DAY   N\V       X 2 YEARS  NECK AND UPPER BACK PN  UPT  NEG    PMH SMOKER  SCOLIOSIS  BACK PN    CV    TECHNIQUE:    IV CONTRAST:    ORAL CONTRAST: N/A    RECTAL CONTRAST: N/A    AXIAL IMAGING @ 5 MM INTERVALS W/MULTIPLANAR RECONSTRUCTION OF IMAGES    -TOTAL IMAGE NUMBER: 120    -CTDIVOL(MGY) HEAD:   BODY: 5.30    -DLP (MGYCM) HEAD:      BODY: 234.90    TECH: RW    COMPARISON:   none    FINDINGS:    Liver:  No clinically significant abnormalities noted.    Gallbladder/Biliary System:  No clinically significant abnormalities noted.    Spleen:  No clinically significant abnormalities noted.    Adrenal glands:  No clinically significant abnormalities noted.    Pancreas:  No clinically significant abnormalities noted.    Kidneys/Urinary Tract:  No clinically significant abnormalities noted.    Urinary bladder:  No clinically significant abnormalities noted.    Prostate gland/uterus and ovaries:  No clinically significant abnormalities noted.    GI tract:  No  clinically significant abnormalities noted.    Vascular structures:  No clinically significant abnormalities noted.    Musculoskeletal structures:  No clinically significant abnormalities noted.    Miscellaneous:  No clinically significant abnormalities noted.                                       Medications   promethazine injection 12.5 mg (12.5 mg Intramuscular Given 5/2/23 2003)   diphenhydrAMINE injection 25 mg (25 mg Intramuscular Given 5/2/23 2003)     Medical Decision Making:   History:   Old Records Summarized: other records.       <> Summary of Records: Cervical cord is normal in signal and caliber.     Limited evaluation of the cervical soft tissues demonstrates no gross abnormality.     C2-3:   No spinal canal or neural foraminal stenosis.     C3-4:  No spinal canal or neural foraminal stenosis.     C4-5:  No spinal canal or neural foraminal stenosis.     C5-6:  No spinal canal or neural foraminal stenosis.     C6-7:  No spinal canal or neural foraminal stenosis.     C7-T1:  No spinal canal or neural foraminal stenosis.     Impression  Unremarkable MRI of the cervical spine.    MRI Lumbar--FINDINGS:  Alignment: There is slight rightward rotation of multiple upper lumbar vertebral bodies, likely related to mild thoracic scoliosis.   Vertebrae: Normal marrow signal. No fracture.   Discs: Normal height and signal.   Cord: Normal.  Conus terminates at L1   Degenerative findings:   No significant disc bulge, spinal canal stenosis, or neural foraminal narrowing.   Paraspinal muscles & soft tissues: Unremarkable.   Impression  As above.  No acute findings.     Electronically signed by:      Jerzy Stevenson MD  Date:                                     09/10/2021  Initial Assessment:   gastroparesis  Differential Diagnosis:   Gastroparesis associated with marijuana use/abuse, chronic pain syndrome, viral illness  Clinical Tests:   Lab Tests: Ordered and Reviewed                        Clinical Impression:    Final diagnoses:  [K31.84] Gastroparesis (Primary)  [G89.4] Chronic pain syndrome  [R11.15] Cyclical vomiting        ED Disposition Condition    Discharge Stable          ED Prescriptions    None       Follow-up Information       Follow up With Specialties Details Why Contact Info    Manuel Kimble MD Family Medicine Schedule an appointment as soon as possible for a visit  As needed, If symptoms worsen [id number] [place] [address]  [place] [address]  [phone]      Manuel Kimble MD Family Medicine   [address] [place] [address]  [place] [address]  [phone]               Clarissa Rodney, [facility]  05/02/23 2008       Clarissa Rodney, [facility]  05/02/23 [id number]

## 2023-05-03 NOTE — DISCHARGE INSTRUCTIONS
Increase fluid intake at home, try liquid IV, pedialyte and water.      Continue current medications at home.  May try OTC acid reducer

## 2023-05-19 ENCOUNTER — OFFICE VISIT (OUTPATIENT)
Dept: RHEUMATOLOGY | Facility: CLINIC | Age: 23
End: 2023-05-19
Payer: MEDICAID

## 2023-05-19 VITALS
WEIGHT: 111.75 LBS | HEIGHT: 65 IN | SYSTOLIC BLOOD PRESSURE: 115 MMHG | BODY MASS INDEX: 18.62 KG/M2 | DIASTOLIC BLOOD PRESSURE: 69 MMHG | HEART RATE: 94 BPM

## 2023-05-19 DIAGNOSIS — M54.6 PAIN IN THORACIC SPINE: Primary | ICD-10-CM

## 2023-05-19 DIAGNOSIS — M53.3 SI (SACROILIAC) PAIN: ICD-10-CM

## 2023-05-19 DIAGNOSIS — M79.7 FIBROMYALGIA: ICD-10-CM

## 2023-05-19 DIAGNOSIS — F32.A DEPRESSION, UNSPECIFIED DEPRESSION TYPE: ICD-10-CM

## 2023-05-19 PROCEDURE — 99215 OFFICE O/P EST HI 40 MIN: CPT | Mod: S$PBB,,, | Performed by: PHYSICIAN ASSISTANT

## 2023-05-19 PROCEDURE — 3074F SYST BP LT 130 MM HG: CPT | Mod: CPTII,,, | Performed by: PHYSICIAN ASSISTANT

## 2023-05-19 PROCEDURE — 99999 PR PBB SHADOW E&M-EST. PATIENT-LVL III: CPT | Mod: PBBFAC,,, | Performed by: PHYSICIAN ASSISTANT

## 2023-05-19 PROCEDURE — 3078F PR MOST RECENT DIASTOLIC BLOOD PRESSURE < 80 MM HG: ICD-10-PCS | Mod: CPTII,,, | Performed by: PHYSICIAN ASSISTANT

## 2023-05-19 PROCEDURE — 1159F PR MEDICATION LIST DOCUMENTED IN MEDICAL RECORD: ICD-10-PCS | Mod: CPTII,,, | Performed by: PHYSICIAN ASSISTANT

## 2023-05-19 PROCEDURE — 3008F BODY MASS INDEX DOCD: CPT | Mod: CPTII,,, | Performed by: PHYSICIAN ASSISTANT

## 2023-05-19 PROCEDURE — 1160F PR REVIEW ALL MEDS BY PRESCRIBER/CLIN PHARMACIST DOCUMENTED: ICD-10-PCS | Mod: CPTII,,, | Performed by: PHYSICIAN ASSISTANT

## 2023-05-19 PROCEDURE — 1159F MED LIST DOCD IN RCRD: CPT | Mod: CPTII,,, | Performed by: PHYSICIAN ASSISTANT

## 2023-05-19 PROCEDURE — 3078F DIAST BP <80 MM HG: CPT | Mod: CPTII,,, | Performed by: PHYSICIAN ASSISTANT

## 2023-05-19 PROCEDURE — 99215 PR OFFICE/OUTPT VISIT, EST, LEVL V, 40-54 MIN: ICD-10-PCS | Mod: S$PBB,,, | Performed by: PHYSICIAN ASSISTANT

## 2023-05-19 PROCEDURE — 3008F PR BODY MASS INDEX (BMI) DOCUMENTED: ICD-10-PCS | Mod: CPTII,,, | Performed by: PHYSICIAN ASSISTANT

## 2023-05-19 PROCEDURE — 99999 PR PBB SHADOW E&M-EST. PATIENT-LVL III: ICD-10-PCS | Mod: PBBFAC,,, | Performed by: PHYSICIAN ASSISTANT

## 2023-05-19 PROCEDURE — 1160F RVW MEDS BY RX/DR IN RCRD: CPT | Mod: CPTII,,, | Performed by: PHYSICIAN ASSISTANT

## 2023-05-19 PROCEDURE — 99213 OFFICE O/P EST LOW 20 MIN: CPT | Mod: PBBFAC | Performed by: PHYSICIAN ASSISTANT

## 2023-05-19 PROCEDURE — 3074F PR MOST RECENT SYSTOLIC BLOOD PRESSURE < 130 MM HG: ICD-10-PCS | Mod: CPTII,,, | Performed by: PHYSICIAN ASSISTANT

## 2023-05-19 NOTE — PROGRESS NOTES
Subjective:      Patient ID: Marilee Dumas is a 23 y.o. female.    Chief Complaint: Sacrococcygeal Disorders        HPI   Marilee Dumas  is a 23 y.o. female here for f/u.  Has been seeing Dr. Glass for thoracic back pain.  You referred her to Rheumatology for evaluation of inflammatory spondyloarthropathy.  Autoimmune workup largely unrevealing.  Patient was released p.r.n..  She has undergone further trigger point injections with Dr. Glass.  She has done physical therapy for over a year.  Unfortunately neither has helped alleviate her symptoms.  Main source of complaints is pain in the upper thoracic region.  Dr. Glass has recently referred her to Psychiatry for cognitive behavioral therapy.  She is seeing Dr. Man.    Dr. Man discontinued Lyrica.  Patient was having side effects from it.  She has also failed gabapentin.  She is now on Effexor.  She is doing cognitive behavioral therapy as well.  Patient returns to the clinic because she still has significant pain not sure where else to go.  In the past has declined nerve blocks with Dr. Glass.  Has not had follow-up with him recently.    She has failed tizanidine, Cymbalta, Celebrex, injections, physical therapy, Flexeril, Tylenol, OTC NSAIDs, gabapentin.  Currently on Elavil 25 mg q.h.s. which does help her sleep.     Pain is concentrated in the upper back.  It has been going on for about 5 years now.  It started getting a lot worse about 2 years ago.  It is interfering with her functional level.  She denies radiating symptoms.  She has minimal pain in the lower back.  She does have some SI joint pain but the vast majority of her pain is in the upper thoracic region.  Pain follows a mixed pattern.   She has had SI joint x-rays which were essentially normal.  MRI t-spine are unremarkable.  MRI Si joints w mild subchondral edema but no evidence of sacroiliitis.    Patient denies fevers, chills, photosensitivity, eye pain, shortness of breath, chest pain,  "hematuria, blood in the stool, rash, sicca symptoms, raynauds, finger ulcerations.  Rheumatologic systems otherwise negative.    Serologies/Labs:  Neg AMALIA  Current Treatment:  Per psych  Previous Treatment:   Tizanidine  Cymbalta  Celebrex  Mobic  Iburprofen  Interventional injections by pain management  Physical therapy  Elavil - now on remeron  Lyrica  gabapentin      Current Outpatient Medications:     mirtazapine (REMERON) 30 MG tablet, Take 30 mg by mouth every evening., Disp: , Rfl:     ondansetron (ZOFRAN) 4 MG tablet, Take 4 mg by mouth every 8 (eight) hours as needed., Disp: , Rfl:     albuterol (PROVENTIL/VENTOLIN HFA) 90 mcg/actuation inhaler, Inhale 1-2 puffs into the lungs every 6 (six) hours as needed for Wheezing., Disp: 8 g, Rfl: 0    diazePAM (VALIUM) 5 MG tablet, 1 PO one hr prior to mri.  Repeat once 30 min prior to mri if needed (Patient not taking: Reported on 5/19/2023), Disp: 2 tablet, Rfl: 0    ondansetron (ZOFRAN-ODT) 4 MG TbDL, Take 1 tablet (4 mg total) by mouth every 6 (six) hours as needed. (Patient not taking: Reported on 5/19/2023), Disp: 15 tablet, Rfl: 0  No current facility-administered medications for this visit.    Facility-Administered Medications Ordered in Other Visits:     ondansetron injection 4 mg, 4 mg, Intravenous, Once PRN, Roland Glass MD    Past Medical History:   Diagnosis Date    Back pain     Neck pain     Scoliosis      History reviewed. No pertinent family history.  Social History     Socioeconomic History    Marital status: Single   Tobacco Use    Smoking status: Every Day     Types: Vaping with nicotine    Smokeless tobacco: Former   Substance and Sexual Activity    Alcohol use: Never    Drug use: Yes     Types: Marijuana     Comment: PRESCRIBED    Sexual activity: Yes     Partners: Female     Review of patient's allergies indicates:   Allergen Reactions    Penicillins        Objective:   /69   Pulse 94   Ht 5' 5" (1.651 m)   Wt 50.7 kg (111 lb 12.4 " oz)   BMI 18.60 kg/m²   Immunization History   Administered Date(s) Administered    DTaP 2002, 2002, 2002, 06/12/2003, 11/21/2006    HIB 2002, 06/12/2003    Hep B / HiB 2002, 2002    Hepatitis A, Pediatric/Adolescent, 2 Dose 06/10/2015, 07/12/2016    Hepatitis B, Pediatric/Adolescent 2002    IPV 2002, 2002, 2002, 11/21/2006    Influenza 12/02/2011    MMR 06/12/2003    MMRV 11/21/2006    Meningococcal Conjugate (MCV4P) 09/12/2013, 06/04/2018    Pneumococcal Conjugate - 7 Valent 2002, 2002, 06/12/2003    Tdap 09/12/2013    Varicella 08/14/2009       Physical Exam   Constitutional: She is oriented to person, place, and time. No distress.   HENT:   Head: Normocephalic and atraumatic.   Pulmonary/Chest: Effort normal.   Abdominal: She exhibits no distension.   Musculoskeletal:         General: No swelling or tenderness. Normal range of motion.      Cervical back: Normal range of motion.   Lymphadenopathy:     She has no cervical adenopathy.   Neurological: She is alert and oriented to person, place, and time.   Skin: Skin is warm and dry. No rash noted.   Psychiatric: Mood normal.   Nursing note and vitals reviewed.   10/18 tender trigger points  Minimal ttp SI joints      Recent Results (from the past 672 hour(s))   Urinalysis, Reflex to Urine Culture    Collection Time: 05/02/23  7:57 PM    Specimen: Urine   Result Value Ref Range    Color, UA Yellow Yellow, Light-Yellow, Dark Yellow, Autumn, Straw    Appearance, UA Clear Clear    Specific Gravity, UA 1.025     pH, UA 6.0 5.0 - 8.5    Protein, UA Negative Negative mg/dL    Glucose, UA Negative Negative, Normal mg/dL    Ketones, UA >=80 (A) Negative mg/dL    Blood, UA Negative Negative unit/L    Bilirubin, UA Small (A) Negative mg/dL    Urobilinogen, UA 0.2 0.2, 1.0, Normal mg/dL    Nitrites, UA Negative Negative    Leukocyte Esterase, UA Negative Negative unit/L   HCG Qualitative Urine     Collection Time: 05/02/23  7:57 PM   Result Value Ref Range    Beta hCG Qualitative, Urine Negative Negative   Lipase    Collection Time: 05/02/23  7:58 PM   Result Value Ref Range    Lipase Level 51 23 - 300 U/L   Comprehensive metabolic panel    Collection Time: 05/02/23  7:58 PM   Result Value Ref Range    Sodium Level 138 135 - 145 mmol/L    Potassium Level 3.5 3.5 - 5.1 mmol/L    Chloride 105 98 - 110 mmol/L    Carbon Dioxide 24 21 - 32 mmol/L    Glucose Level 75 70 - 115 mg/dL    Blood Urea Nitrogen 10.0 7.0 - 20.0 mg/dL    Creatinine 0.72 0.66 - 1.25 mg/dL    Calcium Level Total 9.1 8.4 - 10.2 mg/dL    Protein Total 7.5 6.3 - 8.2 gm/dL    Albumin Level 4.6 3.4 - 5.0 g/dL    Globulin 2.9 2.0 - 3.9 gm/dL    Albumin/Globulin Ratio 1.6 ratio    Bilirubin Total 0.6 0.0 - 1.0 mg/dL    Alkaline Phosphatase 49 (L) 50 - 144 unit/L    Alanine Aminotransferase 18 1 - 45 unit/L    Aspartate Aminotransferase 32 14 - 36 unit/L    eGFR >90 mls/min/1.73/m2    Anion Gap 9.0 2.0 - 13.0 mEq/L    BUN/Creatinine Ratio 14 12 - 20   CBC with Differential    Collection Time: 05/02/23  7:58 PM   Result Value Ref Range    WBC 12.18 (H) 4.00 - 11.50 x10(3)/mcL    RBC 3.96 (L) 4.00 - 5.10 x10(6)/mcL    Hgb 12.2 11.8 - 16.0 g/dL    Hct 35.5 (L) 36.0 - 48.0 %    MCV 89.6 79.0 - 99.0 fL    MCH 30.8 27.0 - 34.0 pg    MCHC 34.4 31.0 - 37.0 g/dL    RDW 11.5 11.0 - 14.5 %    Platelet 252 140 - 371 x10(3)/mcL    MPV 8.9 (L) 9.4 - 12.4 fL    Neut % 65.7 37 - 73 %    Lymph % 28.8 20 - 55 %    Mono % 4.7 4.7 - 12.5 %    Eos % 0.2 (L) 0.7 - 7 %    Basophil % 0.2 0.1 - 1.2 %    Lymph # 3.51 1.16 - 3.74 x10(3)/mcL    Neut # 8.00 (H) 1.56 - 6.13 x10(3)/mcL    Mono # 0.57 (H) 0.24 - 0.36 x10(3)/mcL    Eos # 0.02 (L) 0.04 - 0.36 x10(3)/mcL    Baso # 0.03 0.01 - 0.08 x10(3)/mcL    IG# 0.05 (H) 0.0001 - 0.031 x10(3)/mcL    IG% 0.4 0 - 0.5 %    NRBC% 0.0 0 - 1 %     Recent Results (from the past 2016 hour(s))   Urinalysis, Reflex to Urine Culture     Collection Time: 05/02/23  7:57 PM    Specimen: Urine   Result Value Ref Range    Color, UA Yellow Yellow, Light-Yellow, Dark Yellow, Autumn, Straw    Appearance, UA Clear Clear    Specific Gravity, UA 1.025     pH, UA 6.0 5.0 - 8.5    Protein, UA Negative Negative mg/dL    Glucose, UA Negative Negative, Normal mg/dL    Ketones, UA >=80 (A) Negative mg/dL    Blood, UA Negative Negative unit/L    Bilirubin, UA Small (A) Negative mg/dL    Urobilinogen, UA 0.2 0.2, 1.0, Normal mg/dL    Nitrites, UA Negative Negative    Leukocyte Esterase, UA Negative Negative unit/L   HCG Qualitative Urine    Collection Time: 05/02/23  7:57 PM   Result Value Ref Range    Beta hCG Qualitative, Urine Negative Negative   Lipase    Collection Time: 05/02/23  7:58 PM   Result Value Ref Range    Lipase Level 51 23 - 300 U/L   Comprehensive metabolic panel    Collection Time: 05/02/23  7:58 PM   Result Value Ref Range    Sodium Level 138 135 - 145 mmol/L    Potassium Level 3.5 3.5 - 5.1 mmol/L    Chloride 105 98 - 110 mmol/L    Carbon Dioxide 24 21 - 32 mmol/L    Glucose Level 75 70 - 115 mg/dL    Blood Urea Nitrogen 10.0 7.0 - 20.0 mg/dL    Creatinine 0.72 0.66 - 1.25 mg/dL    Calcium Level Total 9.1 8.4 - 10.2 mg/dL    Protein Total 7.5 6.3 - 8.2 gm/dL    Albumin Level 4.6 3.4 - 5.0 g/dL    Globulin 2.9 2.0 - 3.9 gm/dL    Albumin/Globulin Ratio 1.6 ratio    Bilirubin Total 0.6 0.0 - 1.0 mg/dL    Alkaline Phosphatase 49 (L) 50 - 144 unit/L    Alanine Aminotransferase 18 1 - 45 unit/L    Aspartate Aminotransferase 32 14 - 36 unit/L    eGFR >90 mls/min/1.73/m2    Anion Gap 9.0 2.0 - 13.0 mEq/L    BUN/Creatinine Ratio 14 12 - 20   CBC with Differential    Collection Time: 05/02/23  7:58 PM   Result Value Ref Range    WBC 12.18 (H) 4.00 - 11.50 x10(3)/mcL    RBC 3.96 (L) 4.00 - 5.10 x10(6)/mcL    Hgb 12.2 11.8 - 16.0 g/dL    Hct 35.5 (L) 36.0 - 48.0 %    MCV 89.6 79.0 - 99.0 fL    MCH 30.8 27.0 - 34.0 pg    MCHC 34.4 31.0 - 37.0 g/dL    RDW 11.5  11.0 - 14.5 %    Platelet 252 140 - 371 x10(3)/mcL    MPV 8.9 (L) 9.4 - 12.4 fL    Neut % 65.7 37 - 73 %    Lymph % 28.8 20 - 55 %    Mono % 4.7 4.7 - 12.5 %    Eos % 0.2 (L) 0.7 - 7 %    Basophil % 0.2 0.1 - 1.2 %    Lymph # 3.51 1.16 - 3.74 x10(3)/mcL    Neut # 8.00 (H) 1.56 - 6.13 x10(3)/mcL    Mono # 0.57 (H) 0.24 - 0.36 x10(3)/mcL    Eos # 0.02 (L) 0.04 - 0.36 x10(3)/mcL    Baso # 0.03 0.01 - 0.08 x10(3)/mcL    IG# 0.05 (H) 0.0001 - 0.031 x10(3)/mcL    IG% 0.4 0 - 0.5 %    NRBC% 0.0 0 - 1 %          No results found for: TBGOLDPLUS   No results found for: HAV, HEPAIGM, HEPBIGM, HEPBCAB, HBEAG, HEPCAB     Assessment:     1. Pain in thoracic spine    2. SI (sacroiliac) pain    3. Depression, unspecified depression type    4. Fibromyalgia                  Plan:     Marilee was seen today for sacrococcygeal disorders.    Diagnoses and all orders for this visit:    Pain in thoracic spine    SI (sacroiliac) pain    Depression, unspecified depression type    Fibromyalgia            Back pain  Mainly upper back  Mixed pattern\  MRI T-spine  Essentially normal  Failed Celebrex, tylenol, OTC NSAIDs  Failed PT  Failed bilateral med branch block at T3, T4, and T5  Failed tizanidine, and robaxin and flexeril as well  F/u w Dr. Glass to discuss if nerve blocks an option  Minimal SI jt pain  Xray SI jts - no erosions  MRI negative for sacroillitis, but mild subchondral edema noted  Repeat ESR/CRP have been WNL  HLA B27 negative  Had long discussion regarding trial of immunosuppressive therapy including risks and benefits of medication including but not limited to infection, malignancy, demyelination neurologic d/o, altered lab values.  Leery to trial immunosuppressive therapy d/t atypical pain (upper thoracic pain)  AI work up has been unrevealing as well  Pt aware even if we decide to trial TNF, pain may not improve  Will discuss w Dr. TEE and notify pt of recommendation after that time.  Depression/anxiety and chronic  pain  Agree w CBT  Mgmt per psych  No suicidal thoughts or ideations  No thoughts of harm  Return to clinic: pending above     45 minutes of total time spent on the encounter, which includes face to face time and non-face to face time preparing to see the patient (eg, review of tests), Obtaining and/or reviewing separately obtained history, Documenting clinical information in the electronic or other health record, Independently interpreting results (not separately reported) and communicating results to the patient/family/caregiver, or Care coordination (not separately reported).     No follow-ups on file.    The patient understands, chooses and consents to this plan and accepts all the risks which include but are not limited to the risks mentioned above.     Disclaimer: This note was prepared using a voice recognition system and is likely to have sound alike errors within the text.

## 2023-05-22 ENCOUNTER — PATIENT MESSAGE (OUTPATIENT)
Dept: PAIN MEDICINE | Facility: CLINIC | Age: 23
End: 2023-05-22
Payer: MEDICAID

## 2023-05-22 ENCOUNTER — TELEPHONE (OUTPATIENT)
Dept: RHEUMATOLOGY | Facility: CLINIC | Age: 23
End: 2023-05-22
Payer: MEDICAID

## 2023-05-22 NOTE — TELEPHONE ENCOUNTER
----- Message from Gurmeet Montgomery MD sent at 5/21/2023 11:45 AM CDT -----  Not suggestive of Inflammatory spondyloarthritis . Subchondral edema mild. Could benefit from SI joint CSI.   ----- Message -----  From: Tory Caballero PA-C  Sent: 5/19/2023   9:36 AM CDT  To: Gurmeet Montgomery MD    This pt has chronic back pain.  AI work up negative.  Normal ESR/CRP.  Most pain is in the thoracic region.  Saw Dr. Glass and had trigger point injections and PT without relief.  Looking back at her MRIs, T spine ok.  SI joints no sacroiliitis.  BUT, report makes mention of mild subchondral edema at one SI joint.  Failed all medications.  Now doing CBT w psych.  Would SI MRI w subchondral edema be reason to do trial of Humira?  Has some lumbosacral pain - but much worse in thoracic region.  I also advised her to f/u w Maria Luisa b/c she deferred nerve blocks in past.  Appreciate your input.  Cristina
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
## 2023-05-24 ENCOUNTER — OFFICE VISIT (OUTPATIENT)
Dept: PAIN MEDICINE | Facility: CLINIC | Age: 23
End: 2023-05-24
Payer: MEDICAID

## 2023-05-24 DIAGNOSIS — M47.812 CERVICAL SPONDYLOSIS: Primary | ICD-10-CM

## 2023-05-24 PROCEDURE — 99214 PR OFFICE/OUTPT VISIT, EST, LEVL IV, 30-39 MIN: ICD-10-PCS | Mod: 95,,, | Performed by: PHYSICIAN ASSISTANT

## 2023-05-24 PROCEDURE — 1160F RVW MEDS BY RX/DR IN RCRD: CPT | Mod: CPTII,95,, | Performed by: PHYSICIAN ASSISTANT

## 2023-05-24 PROCEDURE — 99214 OFFICE O/P EST MOD 30 MIN: CPT | Mod: 95,,, | Performed by: PHYSICIAN ASSISTANT

## 2023-05-24 PROCEDURE — 1159F MED LIST DOCD IN RCRD: CPT | Mod: CPTII,95,, | Performed by: PHYSICIAN ASSISTANT

## 2023-05-24 PROCEDURE — 1159F PR MEDICATION LIST DOCUMENTED IN MEDICAL RECORD: ICD-10-PCS | Mod: CPTII,95,, | Performed by: PHYSICIAN ASSISTANT

## 2023-05-24 PROCEDURE — 1160F PR REVIEW ALL MEDS BY PRESCRIBER/CLIN PHARMACIST DOCUMENTED: ICD-10-PCS | Mod: CPTII,95,, | Performed by: PHYSICIAN ASSISTANT

## 2023-05-24 NOTE — Clinical Note
Pain Provider: Maria Luisa Patient Name: Marilee Dumas MRN: [id number] Case: CERVICAL MEDIAL BRANCH BLOCKS 1st diagnostic Level: C5, C6, and C7 Laterality: bilateral Anticoagulation: None  Call next day

## 2023-05-25 ENCOUNTER — PATIENT MESSAGE (OUTPATIENT)
Dept: PAIN MEDICINE | Facility: CLINIC | Age: 23
End: 2023-05-25
Payer: MEDICAID

## 2023-06-05 NOTE — PRE-PROCEDURE INSTRUCTIONS
Spoke with patient regarding procedure scheduled on 6.8     Arrival time 0830     Has patient been sick with fever or on antibiotics within the last 7 days? No     Does the patient have any open wounds, sores or rashes? No     Does the patient have any recent fractures? no     Has patient received a vaccination within the last 7 days? No     Received the COVID vaccination? yes     Has the patient stopped all medications as directed? na     Does patient have a pacemaker and or defibrillator? no     Does the patient have a ride to and from procedure and someone reliable to remain with patient? jossie      Is the patient diabetic? no     Does the patient have sleep apnea? Or use O2 at home? no     Is the patient receiving sedation? Yes     Is the patient instructed to remain NPO beginning at midnight the night before their procedure? yes     Procedure location confirmed with patient? Yes     Covid- Denies signs/symptoms. Instructed to notify PAT/MD if any changes.

## 2023-06-08 ENCOUNTER — HOSPITAL ENCOUNTER (OUTPATIENT)
Facility: HOSPITAL | Age: 23
Discharge: HOME | End: 2023-06-08
Attending: PHYSICAL MEDICINE & REHABILITATION | Admitting: PHYSICAL MEDICINE & REHABILITATION
Payer: MEDICAID

## 2023-06-08 VITALS
TEMPERATURE: 98 F | RESPIRATION RATE: 16 BRPM | SYSTOLIC BLOOD PRESSURE: 120 MMHG | DIASTOLIC BLOOD PRESSURE: 79 MMHG | HEART RATE: 78 BPM | BODY MASS INDEX: 18.27 KG/M2 | OXYGEN SATURATION: 100 % | WEIGHT: 109.69 LBS | HEIGHT: 65 IN

## 2023-06-08 DIAGNOSIS — M47.812 CERVICAL SPONDYLOSIS: Primary | ICD-10-CM

## 2023-06-08 LAB
B-HCG UR QL: NEGATIVE
CTP QC/QA: YES

## 2023-06-08 PROCEDURE — 64491 INJ PARAVERT F JNT C/T 2 LEV: CPT | Mod: 50 | Performed by: PHYSICAL MEDICINE & REHABILITATION

## 2023-06-08 PROCEDURE — 64490 PR INJ DX/THER AGNT PARAVERT FACET JOINT,IMG GUIDE,CERV/THORAC, 1ST LEVEL: ICD-10-PCS | Mod: 50,,, | Performed by: PHYSICAL MEDICINE & REHABILITATION

## 2023-06-08 PROCEDURE — 64491 INJ PARAVERT F JNT C/T 2 LEV: CPT | Mod: 50,,, | Performed by: PHYSICAL MEDICINE & REHABILITATION

## 2023-06-08 PROCEDURE — 64490 INJ PARAVERT F JNT C/T 1 LEV: CPT | Mod: 50,,, | Performed by: PHYSICAL MEDICINE & REHABILITATION

## 2023-06-08 PROCEDURE — 64491 PR INJ DX/THER AGNT PARAVERT FACET JOINT,IMG GUIDE,CERV/THORAC, 2ND LEVEL: ICD-10-PCS | Mod: 50,,, | Performed by: PHYSICAL MEDICINE & REHABILITATION

## 2023-06-08 PROCEDURE — 64490 INJ PARAVERT F JNT C/T 1 LEV: CPT | Mod: 50 | Performed by: PHYSICAL MEDICINE & REHABILITATION

## 2023-06-08 PROCEDURE — 25000003 PHARM REV CODE 250: Performed by: PHYSICAL MEDICINE & REHABILITATION

## 2023-06-08 PROCEDURE — 63600175 PHARM REV CODE 636 W HCPCS: Performed by: PHYSICAL MEDICINE & REHABILITATION

## 2023-06-08 PROCEDURE — 81025 URINE PREGNANCY TEST: CPT | Performed by: PHYSICAL MEDICINE & REHABILITATION

## 2023-06-08 RX ORDER — BUPIVACAINE HYDROCHLORIDE 5 MG/ML
INJECTION, SOLUTION EPIDURAL; INTRACAUDAL
Status: DISCONTINUED | OUTPATIENT
Start: 2023-06-08 | End: 2023-06-08 | Stop reason: HOSPADM

## 2023-06-08 RX ORDER — FENTANYL CITRATE 50 UG/ML
INJECTION, SOLUTION INTRAMUSCULAR; INTRAVENOUS
Status: DISCONTINUED | OUTPATIENT
Start: 2023-06-08 | End: 2023-06-08 | Stop reason: HOSPADM

## 2023-06-08 RX ORDER — MIDAZOLAM HYDROCHLORIDE 1 MG/ML
INJECTION, SOLUTION INTRAMUSCULAR; INTRAVENOUS
Status: DISCONTINUED | OUTPATIENT
Start: 2023-06-08 | End: 2023-06-08 | Stop reason: HOSPADM

## 2023-06-08 NOTE — OP NOTE
CERVICAL Medial Branch Block Under Fluoroscopy  Time-out taken to identify patient and procedure side prior to starting the procedure.        Date of Procedure: 06/08/2023                                                             PROCEDURE:  Bilateral medial branch block at the transverse processes of C5, C6, C7    REASON FOR PROCEDURE:  Cervical spondylosis [M47.812]    PHYSICIAN: Roland Glass MD    ASSISTANTS: None    MEDICATIONS INJECTED:  0.5% Bupivicaine total 5mL  LOCAL ANESTHETIC USED:   Xylocaine 1% 1mL / site    SEDATION MEDICATIONS: Conscious sedation provided by M.D    The patient was monitored with continuous pulse oximetry, EKG, and intermittent blood pressure monitors, immediately prior to administration of sedation.  The patient was hemodynamically stable throughout the entire process was responsive to voice, and breathing spontaneously.  Supplemental O2 was provided at 2L/min via nasal cannula.  Patient was comfortable for the duration of the procedure.     There was a total of 2mg IV Midazolam and 75mcg Fentanyl titrated for the procedure    Total sedation time was >10minutes and <20minutes      ESTIMATED BLOOD LOSS:  None.    COMPLICATIONS:  None.    TECHNIQUE:   Laying in a prone position, the patient was prepped and draped in the usual sterile fashion using ChloraPrep and fenestrated drape.  The level was determined under fluoroscopic guidance.  Local anesthetic was given by going down to the hub of the 27-gauge 1.25in needle and raising a wheel.  A 22-gauge 3.5inch needle was introduced to the anatomic local of the medial branch at each of the stated above levels using fluoroscopy. Then after negative aspiration, the medication was injected slowly. The patient tolerated the procedure well.       The patient was monitored after the procedure.  Patient was given post procedure and discharge instructions to follow at home.  We will see the patient back in two weeks or the patient may call to  inform of status. The patient was discharged in a stable condition

## 2023-06-08 NOTE — DISCHARGE SUMMARY
Discharge Note  Short Stay      SUMMARY     Admit Date: 6/8/2023    Attending Physician: Roland Glass MD        Discharge Physician: Roland Glass MD        Discharge Date: 6/8/2023 10:35 AM    Procedure(s) (LRB):  Bilateral C5-7 MBB 1st diagnostic with RN IV sedation (Bilateral)    Final Diagnosis: Cervical spondylosis [M47.812]    Disposition: Home or self care    Patient Instructions:   Current Discharge Medication List        CONTINUE these medications which have NOT CHANGED    Details   mirtazapine (REMERON) 30 MG tablet Take 30 mg by mouth every evening.      albuterol (PROVENTIL/VENTOLIN HFA) 90 mcg/actuation inhaler Inhale 1-2 puffs into the lungs every 6 (six) hours as needed for Wheezing.  Qty: 8 g, Refills: 0      diazePAM (VALIUM) 5 MG tablet 1 PO one hr prior to mri.  Repeat once 30 min prior to mri if needed  Qty: 2 tablet, Refills: 0    Associated Diagnoses: SI (sacroiliac) pain      ondansetron (ZOFRAN) 4 MG tablet Take 4 mg by mouth every 8 (eight) hours as needed.      ondansetron (ZOFRAN-ODT) 4 MG TbDL Take 1 tablet (4 mg total) by mouth every 6 (six) hours as needed.  Qty: 15 tablet, Refills: 0                 Discharge Diagnosis: Cervical spondylosis [M47.812]  Condition on Discharge: Stable with no complications to procedure   Diet on Discharge: Same as before.  Activity: as per instruction sheet.  Discharge to: Home with a responsible adult.  Follow up: 2-4 weeks       Please call the office at [phone] if you experience any weakness or loss of sensation, fever > 101.5, pain uncontrolled with oral medications, persistent nausea/vomiting/or diarrhea, redness or drainage from the incisions, or any other worrisome concerns. If physician on call was not reached or could not communicate with our office for any reason please go to the nearest emergency department

## 2023-06-08 NOTE — DISCHARGE INSTRUCTIONS

## 2023-06-09 ENCOUNTER — PATIENT MESSAGE (OUTPATIENT)
Dept: PAIN MEDICINE | Facility: CLINIC | Age: 23
End: 2023-06-09
Payer: MEDICAID

## 2023-06-11 ENCOUNTER — PATIENT MESSAGE (OUTPATIENT)
Dept: PAIN MEDICINE | Facility: CLINIC | Age: 23
End: 2023-06-11
Payer: MEDICAID

## 2023-06-13 ENCOUNTER — NURSE TRIAGE (OUTPATIENT)
Dept: ADMINISTRATIVE | Facility: CLINIC | Age: 23
End: 2023-06-13
Payer: MEDICAID

## 2023-06-13 DIAGNOSIS — M47.812 CERVICAL SPONDYLOSIS: Primary | ICD-10-CM

## 2023-06-13 NOTE — TELEPHONE ENCOUNTER
[place]    PCP:  None    S/P Bilat C5-C7 MBB on 6/8 with Dr. Glass.  C/O severe N/V, constant dizziness, and neck pain rated 9/10 that radiates into bilat shoulders and up neck into the head.  She is unable to check temp.  Denies bleeding, drainage, painful urination, numbness/weakness, AMS/confusion, and foul odor.  She does report constipation.  Per protocol, care advised is to go the ED now.  Pt VU.  Advised to call for worsening/questions/concerns.  VU.     Reason for Disposition   SEVERE headache and after spinal (epidural) anesthesia    Additional Information   Negative: Sounds like a life-threatening emergency to the triager   Negative: Bright red, wide-spread, sunburn-like rash    Protocols used: Post-Op Symptoms and [facility]-B-BR

## 2023-06-15 NOTE — PRE-PROCEDURE INSTRUCTIONS
Spoke with patient regarding procedure scheduled on 6.22     Arrival time 1045     Has patient been sick with fever or on antibiotics within the last 7 days? No     Does the patient have any open wounds, sores or rashes? No     Does the patient have any recent fractures? no     Has patient received a vaccination within the last 7 days? No     Received the COVID vaccination? yes     Has the patient stopped all medications as directed? na     Does patient have a pacemaker and or defibrillator? no     Does the patient have a ride to and from procedure and someone reliable to remain with patient? GALE     Is the patient diabetic? no     Does the patient have sleep apnea? Or use O2 at home? no     Is the patient receiving sedation? Yes     Is the patient instructed to remain NPO beginning at midnight the night before their procedure? yes     Procedure location confirmed with patient? Yes     Covid- Denies signs/symptoms. Instructed to notify PAT/MD if any changes.

## 2023-06-16 RX ORDER — AMITRIPTYLINE HYDROCHLORIDE 25 MG/1
25 TABLET, FILM COATED ORAL NIGHTLY PRN
Qty: 30 TABLET | Refills: 1 | Status: ON HOLD | OUTPATIENT
Start: 2023-06-16 | End: 2023-08-22 | Stop reason: HOSPADM

## 2023-06-22 ENCOUNTER — HOSPITAL ENCOUNTER (OUTPATIENT)
Facility: HOSPITAL | Age: 23
Discharge: HOME | End: 2023-06-22
Attending: PHYSICAL MEDICINE & REHABILITATION | Admitting: PHYSICAL MEDICINE & REHABILITATION
Payer: MEDICAID

## 2023-06-22 VITALS
BODY MASS INDEX: 17.77 KG/M2 | WEIGHT: 106.69 LBS | HEART RATE: 94 BPM | SYSTOLIC BLOOD PRESSURE: 106 MMHG | DIASTOLIC BLOOD PRESSURE: 78 MMHG | TEMPERATURE: 98 F | HEIGHT: 65 IN | OXYGEN SATURATION: 97 % | RESPIRATION RATE: 16 BRPM

## 2023-06-22 DIAGNOSIS — M47.812 CERVICAL SPONDYLOSIS: Primary | ICD-10-CM

## 2023-06-22 LAB
B-HCG UR QL: NEGATIVE
CTP QC/QA: YES

## 2023-06-22 PROCEDURE — 64490 PR INJ DX/THER AGNT PARAVERT FACET JOINT,IMG GUIDE,CERV/THORAC, 1ST LEVEL: ICD-10-PCS | Mod: 50,,, | Performed by: PHYSICAL MEDICINE & REHABILITATION

## 2023-06-22 PROCEDURE — 64491 PR INJ DX/THER AGNT PARAVERT FACET JOINT,IMG GUIDE,CERV/THORAC, 2ND LEVEL: ICD-10-PCS | Mod: 50,,, | Performed by: PHYSICAL MEDICINE & REHABILITATION

## 2023-06-22 PROCEDURE — 64490 INJ PARAVERT F JNT C/T 1 LEV: CPT | Mod: 50,,, | Performed by: PHYSICAL MEDICINE & REHABILITATION

## 2023-06-22 PROCEDURE — 25000003 PHARM REV CODE 250: Performed by: PHYSICAL MEDICINE & REHABILITATION

## 2023-06-22 PROCEDURE — 81025 URINE PREGNANCY TEST: CPT | Performed by: PHYSICAL MEDICINE & REHABILITATION

## 2023-06-22 PROCEDURE — 99152 MOD SED SAME PHYS/QHP 5/>YRS: CPT | Performed by: PHYSICAL MEDICINE & REHABILITATION

## 2023-06-22 PROCEDURE — 63600175 PHARM REV CODE 636 W HCPCS: Performed by: PHYSICAL MEDICINE & REHABILITATION

## 2023-06-22 PROCEDURE — 64491 INJ PARAVERT F JNT C/T 2 LEV: CPT | Mod: 50 | Performed by: PHYSICAL MEDICINE & REHABILITATION

## 2023-06-22 PROCEDURE — 64491 INJ PARAVERT F JNT C/T 2 LEV: CPT | Mod: 50,,, | Performed by: PHYSICAL MEDICINE & REHABILITATION

## 2023-06-22 PROCEDURE — 64490 INJ PARAVERT F JNT C/T 1 LEV: CPT | Mod: 50 | Performed by: PHYSICAL MEDICINE & REHABILITATION

## 2023-06-22 RX ORDER — MIDAZOLAM HYDROCHLORIDE 1 MG/ML
INJECTION, SOLUTION INTRAMUSCULAR; INTRAVENOUS
Status: DISCONTINUED | OUTPATIENT
Start: 2023-06-22 | End: 2023-06-22 | Stop reason: HOSPADM

## 2023-06-22 RX ORDER — BUPIVACAINE HYDROCHLORIDE 5 MG/ML
INJECTION, SOLUTION EPIDURAL; INTRACAUDAL
Status: DISCONTINUED | OUTPATIENT
Start: 2023-06-22 | End: 2023-06-22 | Stop reason: HOSPADM

## 2023-06-22 RX ORDER — FENTANYL CITRATE 50 UG/ML
INJECTION, SOLUTION INTRAMUSCULAR; INTRAVENOUS
Status: DISCONTINUED | OUTPATIENT
Start: 2023-06-22 | End: 2023-06-22 | Stop reason: HOSPADM

## 2023-06-22 NOTE — DISCHARGE INSTRUCTIONS

## 2023-06-22 NOTE — H&P
HPI  Patient presenting for Procedure(s) (LRB):  Bilateral C5-7 MBB (#2 diagnostic) (Bilateral)     No health changes since previous encounter    Past Medical History:   Diagnosis Date    Back pain     Neck pain     Scoliosis      Past Surgical History:   Procedure Laterality Date    INJECTION OF ANESTHETIC AGENT AROUND MEDIAL BRANCH NERVES INNERVATING CERVICAL FACET JOINT Left 5/19/2022    Procedure: bilateral T3-5 diagnostic MBB RN IV Sedation;  Surgeon: Roland Glass MD;  Location: [facility];  Service: Pain Management;  Laterality: Left;    INJECTION OF ANESTHETIC AGENT AROUND MEDIAL BRANCH NERVES INNERVATING CERVICAL FACET JOINT Bilateral 6/8/2023    Procedure: Bilateral C5-7 MBB 1st diagnostic with RN IV sedation;  Surgeon: Roland Glass MD;  Location: [facility];  Service: Pain Management;  Laterality: Bilateral;     Review of patient's allergies indicates:   Allergen Reactions    Penicillins         Current Facility-Administered Medications on File Prior to Encounter   Medication Dose Route Frequency Provider Last Rate Last Admin    ondansetron injection 4 mg  4 mg Intravenous Once PRN Roland Glass MD         Current Outpatient Medications on File Prior to Encounter   Medication Sig Dispense Refill    albuterol (PROVENTIL/VENTOLIN HFA) 90 mcg/actuation inhaler Inhale 1-2 puffs into the lungs every 6 (six) hours as needed for Wheezing. 8 g 0    diazePAM (VALIUM) 5 MG tablet 1 PO one hr prior to mri.  Repeat once 30 min prior to mri if needed (Patient not taking: Reported on 5/19/2023) 2 tablet 0    mirtazapine (REMERON) 30 MG tablet Take 30 mg by mouth every evening.      ondansetron (ZOFRAN) 4 MG tablet Take 4 mg by mouth every 8 (eight) hours as needed.      ondansetron (ZOFRAN-ODT) 4 MG TbDL Take 1 tablet (4 mg total) by mouth every 6 (six) hours as needed. (Patient not taking: Reported on 5/19/2023) 15 tablet 0        PMHx, PSHx, Allergies, Medications reviewed in epic    ROS negative  "except pain complaints in HPI    OBJECTIVE:    /80 (BP Location: Right arm, Patient Position: Sitting)   Pulse (!) 139 Comment: states shes very anxious  Temp 97.3 °F (36.3 °C) (Temporal)   Resp 16   Ht 5' 5" (1.651 m)   Wt 48.4 kg (106 lb 11.2 oz)   LMP 06/06/2023 (Exact Date)   Breastfeeding No   BMI 17.76 kg/m²     PHYSICAL EXAMINATION:    GENERAL: Well appearing, in no acute distress, alert and oriented x3.  PSYCH:  Mood and affect appropriate.  SKIN: Skin color, texture, turgor normal, no rashes or lesions which will impact the procedure.  CV: RRR with palpation of the radial artery.  PULM: No evidence of respiratory difficulty, symmetric chest rise. Clear to auscultation.  NEURO: Cranial nerves grossly intact.    Plan:    Proceed with procedure as planned Procedure(s) (LRB):  Bilateral C5-7 MBB (#2 diagnostic) (Bilateral)    Roland Glass MD  06/22/2023            "

## 2023-06-22 NOTE — OP NOTE
CERVICAL Medial Branch Block Under Fluoroscopy  Time-out taken to identify patient and procedure side prior to starting the procedure.        Date of Procedure: 06/22/2023                                                             PROCEDURE:  Bilateral medial branch block at the transverse processes of C5, C6, C7    REASON FOR PROCEDURE:  Cervical spondylosis [M47.812]    PHYSICIAN: Roland Glass MD    ASSISTANTS: None    MEDICATIONS INJECTED:  0.5% Bupivicaine total 5mL  LOCAL ANESTHETIC USED:   Xylocaine 1% 1mL / site    SEDATION MEDICATIONS: Conscious sedation provided by M.D    The patient was monitored with continuous pulse oximetry, EKG, and intermittent blood pressure monitors, immediately prior to administration of sedation.  The patient was hemodynamically stable throughout the entire process was responsive to voice, and breathing spontaneously.  Supplemental O2 was provided at 2L/min via nasal cannula.  Patient was comfortable for the duration of the procedure.     There was a total of 4mg IV Midazolam and  100 mcg Fentanyl titrated for the procedure    Total sedation time was >10minutes and <20minutes    ESTIMATED BLOOD LOSS:  None.    COMPLICATIONS:  None.    TECHNIQUE:   Laying in a prone position, the patient was prepped and draped in the usual sterile fashion using ChloraPrep and fenestrated drape.  The level was determined under fluoroscopic guidance.  Local anesthetic was given by going down to the hub of the 27-gauge 1.25in needle and raising a wheel.  A 22-gauge 3.5inch needle was introduced to the anatomic local of the medial branch at each of the stated above levels using fluoroscopy. Then after negative aspiration, the medication was injected slowly. The patient tolerated the procedure well.       The patient was monitored after the procedure.  Patient was given post procedure and discharge instructions to follow at home.  We will see the patient back in two weeks or the patient may call  to inform of status. The patient was discharged in a stable condition

## 2023-06-22 NOTE — DISCHARGE SUMMARY
Discharge Note  Short Stay      SUMMARY     Admit Date: 6/22/2023    Attending Physician: Roland Glass MD        Discharge Physician: Roland Glass MD        Discharge Date: 6/22/2023 1:46 PM    Procedure(s) (LRB):  Bilateral C5-7 MBB (#2 diagnostic) (Bilateral)    Final Diagnosis: Cervical spondylosis [M47.812]    Disposition: Home or self care    Patient Instructions:   Current Discharge Medication List        CONTINUE these medications which have NOT CHANGED    Details   albuterol (PROVENTIL/VENTOLIN HFA) 90 mcg/actuation inhaler Inhale 1-2 puffs into the lungs every 6 (six) hours as needed for Wheezing.  Qty: 8 g, Refills: 0      amitriptyline (ELAVIL) 25 MG tablet Take 1 tablet (25 mg total) by mouth nightly as needed for Insomnia.  Qty: 30 tablet, Refills: 1      diazePAM (VALIUM) 5 MG tablet 1 PO one hr prior to mri.  Repeat once 30 min prior to mri if needed  Qty: 2 tablet, Refills: 0    Associated Diagnoses: SI (sacroiliac) pain      mirtazapine (REMERON) 30 MG tablet Take 30 mg by mouth every evening.      ondansetron (ZOFRAN) 4 MG tablet Take 4 mg by mouth every 8 (eight) hours as needed.      ondansetron (ZOFRAN-ODT) 4 MG TbDL Take 1 tablet (4 mg total) by mouth every 6 (six) hours as needed.  Qty: 15 tablet, Refills: 0                 Discharge Diagnosis: Cervical spondylosis [M47.812]  Condition on Discharge: Stable with no complications to procedure   Diet on Discharge: Same as before.  Activity: as per instruction sheet.  Discharge to: Home with a responsible adult.  Follow up: 2-4 weeks       Please call the office at [phone] if you experience any weakness or loss of sensation, fever > 101.5, pain uncontrolled with oral medications, persistent nausea/vomiting/or diarrhea, redness or drainage from the incisions, or any other worrisome concerns. If physician on call was not reached or could not communicate with our office for any reason please go to the nearest emergency department

## 2023-06-27 ENCOUNTER — PATIENT MESSAGE (OUTPATIENT)
Dept: PAIN MEDICINE | Facility: CLINIC | Age: 23
End: 2023-06-27
Payer: MEDICAID

## 2023-06-30 ENCOUNTER — TELEPHONE (OUTPATIENT)
Dept: PAIN MEDICINE | Facility: CLINIC | Age: 23
End: 2023-06-30
Payer: MEDICAID

## 2023-06-30 NOTE — TELEPHONE ENCOUNTER
Call patient to inform them that they can  their medication from the pharmacy. P.t  understand. All question answered.     Eliazar Alcantara   Medical Assistant

## 2023-06-30 NOTE — TELEPHONE ENCOUNTER
----- Message from Shi Jose sent at 6/30/2023  8:53 AM CDT -----  Contact: Marilee  Type:  RX Refill Request    Who Called: Marilee  Refill or New Rx:refill  RX Name and Strength:amitriptyline (ELAVIL) 25 MG tablet  How is the patient currently taking it? (ex. 1XDay):as prescribed  Is this a 30 day or 90 day RX:30  Preferred Pharmacy with phone number:  [id number] - [place] [id number] [address] [address] [address]  [place] [address]  Phone: [phone] Fax: [phone]  Local or Mail Order:local  Ordering Provider:Dr. Glass  Would the patient rather a call back or a response via [facility]? call  Best Call Back Number:[phone]  Additional Information: Patient reports being without prescription for approximately 5 days and request refill.  Thank you,  GH

## 2023-07-07 ENCOUNTER — PATIENT MESSAGE (OUTPATIENT)
Dept: PAIN MEDICINE | Facility: CLINIC | Age: 23
End: 2023-07-07
Payer: MEDICAID

## 2023-07-07 ENCOUNTER — OFFICE VISIT (OUTPATIENT)
Dept: PAIN MEDICINE | Facility: CLINIC | Age: 23
End: 2023-07-07
Payer: MEDICAID

## 2023-07-07 DIAGNOSIS — M47.812 CERVICAL SPONDYLOSIS: Primary | ICD-10-CM

## 2023-07-07 PROCEDURE — 99212 OFFICE O/P EST SF 10 MIN: CPT | Mod: 95,,, | Performed by: PHYSICAL MEDICINE & REHABILITATION

## 2023-07-07 PROCEDURE — 99212 PR OFFICE/OUTPT VISIT, EST, LEVL II, 10-19 MIN: ICD-10-PCS | Mod: 95,,, | Performed by: PHYSICAL MEDICINE & REHABILITATION

## 2023-07-07 NOTE — Clinical Note
Pain Provider: Maria Luisa Patient Name: Marilee Dumas MRN: [id number] Case: CERVICAL RFA Level: C5, C6, and C7 Laterality: left Anticoagulation:  None  Considering performing the right side if she is able tolerate the left side, no follow-up to schedule as of now
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
## 2023-07-17 NOTE — PRE-PROCEDURE INSTRUCTIONS
Spoke with patient regarding procedure scheduled on 7.25     Arrival time 0700     Has patient been sick with fever or on antibiotics within the last 7 days? No     Does the patient have any open wounds, sores or rashes? No     Does the patient have any recent fractures? no     Has patient received a vaccination within the last 7 days? No     Received the COVID vaccination? yes     Has the patient stopped all medications as directed? na     Does patient have a pacemaker and or defibrillator? no     Does the patient have a ride to and from procedure and someone reliable to remain with patient? mara bal      Is the patient diabetic? no     Does the patient have sleep apnea? Or use O2 at home? no     Is the patient receiving sedation? yes     Is the patient instructed to remain NPO beginning at midnight the night before their procedure? yes     Procedure location confirmed with patient? Yes     Covid- Denies signs/symptoms. Instructed to notify PAT/MD if any changes.

## 2023-07-19 ENCOUNTER — TELEPHONE (OUTPATIENT)
Dept: PAIN MEDICINE | Facility: CLINIC | Age: 23
End: 2023-07-19
Payer: MEDICAID

## 2023-07-19 NOTE — TELEPHONE ENCOUNTER
----- Message from Joanna Vicente sent at 7/19/2023 10:10 AM CDT -----  Regarding: pt call back  Name of Who is Calling:BHARGAV MINAYA [[id number]]           What is the request in detail: pt needs to be called regarding medication            Can the clinic reply by [facility]:           What Number to Call Back if not in [facility]: [phone]

## 2023-07-25 ENCOUNTER — HOSPITAL ENCOUNTER (OUTPATIENT)
Facility: HOSPITAL | Age: 23
Discharge: HOME | End: 2023-07-25
Attending: PHYSICAL MEDICINE & REHABILITATION | Admitting: PHYSICAL MEDICINE & REHABILITATION
Payer: MEDICAID

## 2023-07-25 VITALS
BODY MASS INDEX: 17.6 KG/M2 | WEIGHT: 105.63 LBS | RESPIRATION RATE: 16 BRPM | OXYGEN SATURATION: 100 % | HEIGHT: 65 IN | DIASTOLIC BLOOD PRESSURE: 76 MMHG | HEART RATE: 76 BPM | SYSTOLIC BLOOD PRESSURE: 119 MMHG | TEMPERATURE: 98 F

## 2023-07-25 DIAGNOSIS — M47.812 CERVICAL SPONDYLOSIS: Primary | ICD-10-CM

## 2023-07-25 LAB
B-HCG UR QL: NEGATIVE
CTP QC/QA: YES

## 2023-07-25 PROCEDURE — 64633 PR DESTROY CERV/THOR FACET JNT: ICD-10-PCS | Mod: LT,,, | Performed by: PHYSICAL MEDICINE & REHABILITATION

## 2023-07-25 PROCEDURE — 63600175 PHARM REV CODE 636 W HCPCS: Performed by: PHYSICAL MEDICINE & REHABILITATION

## 2023-07-25 PROCEDURE — 64633 DESTROY CERV/THOR FACET JNT: CPT | Mod: LT | Performed by: PHYSICAL MEDICINE & REHABILITATION

## 2023-07-25 PROCEDURE — 81025 URINE PREGNANCY TEST: CPT | Performed by: PHYSICAL MEDICINE & REHABILITATION

## 2023-07-25 PROCEDURE — 64633 DESTROY CERV/THOR FACET JNT: CPT | Mod: LT,,, | Performed by: PHYSICAL MEDICINE & REHABILITATION

## 2023-07-25 PROCEDURE — 99152 MOD SED SAME PHYS/QHP 5/>YRS: CPT | Performed by: PHYSICAL MEDICINE & REHABILITATION

## 2023-07-25 PROCEDURE — 64634 DESTROY C/TH FACET JNT ADDL: CPT | Mod: LT,,, | Performed by: PHYSICAL MEDICINE & REHABILITATION

## 2023-07-25 PROCEDURE — 64634 PR DESTROY C/TH FACET JNT ADDL: ICD-10-PCS | Mod: LT,,, | Performed by: PHYSICAL MEDICINE & REHABILITATION

## 2023-07-25 PROCEDURE — 64634 DESTROY C/TH FACET JNT ADDL: CPT | Mod: LT | Performed by: PHYSICAL MEDICINE & REHABILITATION

## 2023-07-25 RX ORDER — FENTANYL CITRATE 50 UG/ML
INJECTION, SOLUTION INTRAMUSCULAR; INTRAVENOUS
Status: DISCONTINUED | OUTPATIENT
Start: 2023-07-25 | End: 2023-07-25 | Stop reason: HOSPADM

## 2023-07-25 RX ORDER — IBUPROFEN 800 MG/1
800 TABLET ORAL EVERY 6 HOURS PRN
Qty: 28 TABLET | Refills: 0 | Status: ON HOLD | OUTPATIENT
Start: 2023-07-25 | End: 2023-08-22 | Stop reason: SDUPTHER

## 2023-07-25 RX ORDER — MIDAZOLAM HYDROCHLORIDE 1 MG/ML
INJECTION, SOLUTION INTRAMUSCULAR; INTRAVENOUS
Status: DISCONTINUED | OUTPATIENT
Start: 2023-07-25 | End: 2023-07-25 | Stop reason: HOSPADM

## 2023-07-25 RX ORDER — BUPIVACAINE HYDROCHLORIDE 2.5 MG/ML
INJECTION, SOLUTION EPIDURAL; INFILTRATION; INTRACAUDAL
Status: DISCONTINUED | OUTPATIENT
Start: 2023-07-25 | End: 2023-07-25 | Stop reason: HOSPADM

## 2023-07-25 NOTE — DISCHARGE SUMMARY
Discharge Note  Short Stay      SUMMARY     Admit Date: 7/25/2023    Attending Physician: Roland Glass MD        Discharge Physician: Roland Glass MD        Discharge Date: 7/25/2023 8:32 AM    Procedure(s) (LRB):  Left C5-7 RFA with RN IV sedation (Left)    Final Diagnosis: Cervical spondylosis [M47.812]    Disposition: Home or self care    Patient Instructions:   Current Discharge Medication List        CONTINUE these medications which have NOT CHANGED    Details   amitriptyline (ELAVIL) 25 MG tablet Take 1 tablet (25 mg total) by mouth nightly as needed for Insomnia.  Qty: 30 tablet, Refills: 1      albuterol (PROVENTIL/VENTOLIN HFA) 90 mcg/actuation inhaler Inhale 1-2 puffs into the lungs every 6 (six) hours as needed for Wheezing.  Qty: 8 g, Refills: 0      ondansetron (ZOFRAN) 4 MG tablet Take 4 mg by mouth every 8 (eight) hours as needed.      ondansetron (ZOFRAN-ODT) 4 MG TbDL Take 1 tablet (4 mg total) by mouth every 6 (six) hours as needed.  Qty: 15 tablet, Refills: 0                 Discharge Diagnosis: Cervical spondylosis [M47.812]  Condition on Discharge: Stable with no complications to procedure   Diet on Discharge: Same as before.  Activity: as per instruction sheet.  Discharge to: Home with a responsible adult.  Follow up: 2-4 weeks       Please call the office at [phone] if you experience any weakness or loss of sensation, fever > 101.5, pain uncontrolled with oral medications, persistent nausea/vomiting/or diarrhea, redness or drainage from the incisions, or any other worrisome concerns. If physician on call was not reached or could not communicate with our office for any reason please go to the nearest emergency department

## 2023-07-25 NOTE — OP NOTE
Cervical Medial nerve branch block radiofrequency ablation Under Fluoroscopy     Time-out taken to identify patient and procedure side prior to starting the procedure.     07/25/2023    Patient has clinical and imaging findings suggestive of facet mediated pain and has completed 2 diagnostic medial branch blocks at specified levels with at least 80% relief for the expected duration of the local anesthetic utilized.    For supporting clinical documentation, please see most recent clinic and telephone notes.     PROCEDURE: Left radiofrequency ablation of the the medial branch nerves at the   transverse process of   C5, C6, C7    2)Conscious sedation provided by MD     REASON FOR PROCEDURE: Cervical spondylosis [M47.812]     PHYSICIAN: Roland Glass MD    ASSISTANTS: None     SEDATION: Conscious sedation provided by M.D    The patient was monitored with continuous pulse oximetry, EKG, and intermittent blood pressure monitors, immediately prior to administration of sedation.  The patient was hemodynamically stable throughout the entire process was responsive to voice, and breathing spontaneously.  Supplemental O2 was provided at 2L/min via nasal cannula.  Patient was comfortable for the duration of the procedure.     There was a total of 4mg IV Midazolam and 100mcg Fentanyl titrated for the procedure    Total sedation time was >10minutes and <20minutes      MEDICATIONS INJECTED: 0.25% Bupivicaine total 6mL     LOCAL ANESTHETIC USED: Xylocaine 1% 1mL / site     ESTIMATED BLOOD LOSS: None.     COMPLICATIONS: None.     Interval history: Patient reports that he had complete relief of pain for the day of the procedure, we will proceed with the RFA     TECHNIQUE: Laying in a prone position, the patient was prepped and draped in the usual sterile fashion using ChloraPrep and fenestrated drape. The level was determined under fluoroscopic guidance. Local anesthetic was given by going down to the hub of the 27-gauge 1.25in  needle and raising a wheel. A 18-gauge 10mm curved active tip needle was introduced to the anatomic local of the medial branch at each of the stated above levels using fluoroscopy. Then sensory and motor testing was performed to confirm that the needle tips were in the correct location. Then after negative aspiration, 1 mL of 0.25% bupivacaine was injected into each level. This was followed by thermal lesioning at 80 degrees celsius for 90 seconds.     The patient tolerated the procedure well. Did not have any procedure related motor deficit at the conclusion of the procedure    The patient was monitored after the procedure. Patient was given post procedure and discharge instructions to follow at home. We will see the patient back in two weeks or the patient may call to inform of status. The patient was discharged in a stable condition

## 2023-07-25 NOTE — DISCHARGE INSTRUCTIONS

## 2023-07-25 NOTE — H&P
HPI  Patient presenting for Procedure(s) (LRB):  Left C5-7 RFA with RN IV sedation (Left)       No health changes since previous encounter    Past Medical History:   Diagnosis Date    Back pain     Neck pain     Scoliosis      Past Surgical History:   Procedure Laterality Date    INJECTION OF ANESTHETIC AGENT AROUND MEDIAL BRANCH NERVES INNERVATING CERVICAL FACET JOINT Left 5/19/2022    Procedure: bilateral T3-5 diagnostic MBB RN IV Sedation;  Surgeon: Roland Glass MD;  Location: [facility];  Service: Pain Management;  Laterality: Left;    INJECTION OF ANESTHETIC AGENT AROUND MEDIAL BRANCH NERVES INNERVATING CERVICAL FACET JOINT Bilateral 6/8/2023    Procedure: Bilateral C5-7 MBB 1st diagnostic with RN IV sedation;  Surgeon: Roland Glass MD;  Location: [facility];  Service: Pain Management;  Laterality: Bilateral;    INJECTION OF ANESTHETIC AGENT AROUND MEDIAL BRANCH NERVES INNERVATING CERVICAL FACET JOINT Bilateral 6/22/2023    Procedure: Bilateral C5-7 MBB (#2 diagnostic);  Surgeon: Roland Glass MD;  Location: [facility];  Service: Pain Management;  Laterality: Bilateral;     Review of patient's allergies indicates:   Allergen Reactions    Penicillins         Current Facility-Administered Medications on File Prior to Encounter   Medication Dose Route Frequency Provider Last Rate Last Admin    ondansetron injection 4 mg  4 mg Intravenous Once PRN Roland Glass MD         Current Outpatient Medications on File Prior to Encounter   Medication Sig Dispense Refill    amitriptyline (ELAVIL) 25 MG tablet Take 1 tablet (25 mg total) by mouth nightly as needed for Insomnia. 30 tablet 1    albuterol (PROVENTIL/VENTOLIN HFA) 90 mcg/actuation inhaler Inhale 1-2 puffs into the lungs every 6 (six) hours as needed for Wheezing. 8 g 0    ondansetron (ZOFRAN) 4 MG tablet Take 4 mg by mouth every 8 (eight) hours as needed.      ondansetron (ZOFRAN-ODT) 4 MG TbDL Take 1 tablet (4 mg total) by mouth every  "6 (six) hours as needed. (Patient not taking: Reported on 5/19/2023) 15 tablet 0        PMHx, PSHx, Allergies, Medications reviewed in epic    ROS negative except pain complaints in HPI    OBJECTIVE:    /79 (BP Location: Right arm, Patient Position: Sitting)   Pulse 95   Temp 97.9 °F (36.6 °C) (Temporal)   Resp 18   Ht 5' 5" (1.651 m)   Wt 47.9 kg (105 lb 9.6 oz)   SpO2 100%   Breastfeeding No   BMI 17.57 kg/m²     PHYSICAL EXAMINATION:    GENERAL: Well appearing, in no acute distress, alert and oriented x3.  PSYCH:  Mood and affect appropriate.  SKIN: Skin color, texture, turgor normal, no rashes or lesions which will impact the procedure.  CV: RRR with palpation of the radial artery.  PULM: No evidence of respiratory difficulty, symmetric chest rise. Clear to auscultation.  NEURO: Cranial nerves grossly intact.    Plan:    Proceed with procedure as planned Procedure(s) (LRB):  Left C5-7 RFA with RN IV sedation (Left)    Roland Glass MD  07/25/2023            "

## 2023-07-26 ENCOUNTER — TELEPHONE (OUTPATIENT)
Dept: PAIN MEDICINE | Facility: CLINIC | Age: 23
End: 2023-07-26
Payer: MEDICAID

## 2023-07-26 RX ORDER — ONDANSETRON 4 MG/1
4 TABLET, ORALLY DISINTEGRATING ORAL EVERY 6 HOURS PRN
Qty: 15 TABLET | Refills: 0 | Status: SHIPPED | OUTPATIENT
Start: 2023-07-26

## 2023-07-26 RX ORDER — ONDANSETRON 4 MG/1
4 TABLET, ORALLY DISINTEGRATING ORAL EVERY 6 HOURS PRN
Qty: 15 TABLET | Refills: 0 | Status: SHIPPED | OUTPATIENT
Start: 2023-07-26 | End: 2023-07-26

## 2023-07-26 NOTE — TELEPHONE ENCOUNTER
----- Message from Joanna Vicente sent at 7/26/2023 11:47 AM CDT -----  Regarding: pt call back  Name of Who is Calling:BHARGAV MINAYA [[id number]]           What is the request in detail: pt needs to be called soon as possible            Can the clinic reply by [facility]:           What Number to Call Back if not in [facility]: [phone]

## 2023-07-31 ENCOUNTER — PATIENT MESSAGE (OUTPATIENT)
Dept: PAIN MEDICINE | Facility: CLINIC | Age: 23
End: 2023-07-31
Payer: MEDICAID

## 2023-08-09 DIAGNOSIS — M47.812 CERVICAL SPONDYLOSIS: Primary | ICD-10-CM

## 2023-08-14 NOTE — PRE-PROCEDURE INSTRUCTIONS
Spoke with patient regarding procedure scheduled on 8.22     Arrival time 0645     Has patient been sick with fever or on antibiotics within the last 7 days? No     Does the patient have any open wounds, sores or rashes? No     Does the patient have any recent fractures? no     Has patient received a vaccination within the last 7 days? No     Received the COVID vaccination? yes     Has the patient stopped all medications as directed? na     Does patient have a pacemaker and or defibrillator? no     Does the patient have a ride to and from procedure and someone reliable to remain with patient? mara bal     Is the patient diabetic? no     Does the patient have sleep apnea? Or use O2 at home? no     Is the patient receiving sedation? yes     Is the patient instructed to remain NPO beginning at midnight the night before their procedure? yes     Procedure location confirmed with patient? Yes     Covid- Denies signs/symptoms. Instructed to notify PAT/MD if any changes.

## 2023-08-22 ENCOUNTER — HOSPITAL ENCOUNTER (OUTPATIENT)
Facility: HOSPITAL | Age: 23
Discharge: HOME | End: 2023-08-22
Attending: PHYSICAL MEDICINE & REHABILITATION | Admitting: PHYSICAL MEDICINE & REHABILITATION
Payer: MEDICAID

## 2023-08-22 ENCOUNTER — PATIENT MESSAGE (OUTPATIENT)
Dept: PAIN MEDICINE | Facility: HOSPITAL | Age: 23
End: 2023-08-22
Payer: MEDICAID

## 2023-08-22 VITALS
BODY MASS INDEX: 17.65 KG/M2 | RESPIRATION RATE: 16 BRPM | WEIGHT: 105.94 LBS | HEART RATE: 75 BPM | DIASTOLIC BLOOD PRESSURE: 68 MMHG | OXYGEN SATURATION: 100 % | HEIGHT: 65 IN | SYSTOLIC BLOOD PRESSURE: 108 MMHG | TEMPERATURE: 98 F

## 2023-08-22 DIAGNOSIS — M47.812 CERVICAL SPONDYLOSIS: Primary | ICD-10-CM

## 2023-08-22 DIAGNOSIS — M79.7 FIBROMYALGIA: ICD-10-CM

## 2023-08-22 LAB
B-HCG UR QL: NEGATIVE
CTP QC/QA: YES

## 2023-08-22 PROCEDURE — 64634 PR DESTROY C/TH FACET JNT ADDL: ICD-10-PCS | Mod: RT,,, | Performed by: PHYSICAL MEDICINE & REHABILITATION

## 2023-08-22 PROCEDURE — 64634 DESTROY C/TH FACET JNT ADDL: CPT | Mod: RT | Performed by: PHYSICAL MEDICINE & REHABILITATION

## 2023-08-22 PROCEDURE — 64634 DESTROY C/TH FACET JNT ADDL: CPT | Mod: RT,,, | Performed by: PHYSICAL MEDICINE & REHABILITATION

## 2023-08-22 PROCEDURE — 63600175 PHARM REV CODE 636 W HCPCS: Performed by: PHYSICAL MEDICINE & REHABILITATION

## 2023-08-22 PROCEDURE — 64633 DESTROY CERV/THOR FACET JNT: CPT | Mod: RT,,, | Performed by: PHYSICAL MEDICINE & REHABILITATION

## 2023-08-22 PROCEDURE — 99152 MOD SED SAME PHYS/QHP 5/>YRS: CPT | Performed by: PHYSICAL MEDICINE & REHABILITATION

## 2023-08-22 PROCEDURE — 64633 PR DESTROY CERV/THOR FACET JNT: ICD-10-PCS | Mod: RT,,, | Performed by: PHYSICAL MEDICINE & REHABILITATION

## 2023-08-22 PROCEDURE — 81025 URINE PREGNANCY TEST: CPT | Performed by: PHYSICAL MEDICINE & REHABILITATION

## 2023-08-22 PROCEDURE — 64633 DESTROY CERV/THOR FACET JNT: CPT | Mod: RT | Performed by: PHYSICAL MEDICINE & REHABILITATION

## 2023-08-22 PROCEDURE — 25000003 PHARM REV CODE 250: Performed by: PHYSICAL MEDICINE & REHABILITATION

## 2023-08-22 RX ORDER — FENTANYL CITRATE 50 UG/ML
INJECTION, SOLUTION INTRAMUSCULAR; INTRAVENOUS
Status: DISCONTINUED | OUTPATIENT
Start: 2023-08-22 | End: 2023-08-22 | Stop reason: HOSPADM

## 2023-08-22 RX ORDER — BUPIVACAINE HYDROCHLORIDE 2.5 MG/ML
INJECTION, SOLUTION EPIDURAL; INFILTRATION; INTRACAUDAL
Status: DISCONTINUED | OUTPATIENT
Start: 2023-08-22 | End: 2023-08-22 | Stop reason: HOSPADM

## 2023-08-22 RX ORDER — IBUPROFEN 800 MG/1
800 TABLET ORAL EVERY 6 HOURS PRN
Qty: 28 TABLET | Refills: 0 | Status: SHIPPED | OUTPATIENT
Start: 2023-08-22

## 2023-08-22 RX ORDER — DIAZEPAM 5 MG/1
5 TABLET ORAL ONCE
Status: COMPLETED | OUTPATIENT
Start: 2023-08-22 | End: 2023-08-22

## 2023-08-22 RX ORDER — ONDANSETRON 2 MG/ML
4 INJECTION INTRAMUSCULAR; INTRAVENOUS ONCE AS NEEDED
Status: DISCONTINUED | OUTPATIENT
Start: 2023-08-22 | End: 2023-08-22 | Stop reason: HOSPADM

## 2023-08-22 RX ORDER — MIDAZOLAM HYDROCHLORIDE 1 MG/ML
INJECTION, SOLUTION INTRAMUSCULAR; INTRAVENOUS
Status: DISCONTINUED | OUTPATIENT
Start: 2023-08-22 | End: 2023-08-22 | Stop reason: HOSPADM

## 2023-08-22 RX ORDER — METHOCARBAMOL 500 MG/1
500 TABLET, FILM COATED ORAL 3 TIMES DAILY PRN
Qty: 90 TABLET | Refills: 2 | Status: SHIPPED | OUTPATIENT
Start: 2023-08-22

## 2023-08-22 RX ADMIN — DIAZEPAM 5 MG: 5 TABLET ORAL at 07:08

## 2023-08-22 NOTE — OP NOTE
Cervical Medial nerve branch block radiofrequency ablation Under Fluoroscopy     Time-out taken to identify patient and procedure side prior to starting the procedure.     08/22/2023    Patient has clinical and imaging findings suggestive of facet mediated pain and has completed 2 diagnostic medial branch blocks at specified levels with at least 80% relief for the expected duration of the local anesthetic utilized.    For supporting clinical documentation, please see most recent clinic and telephone notes.     PROCEDURE: Right radiofrequency ablation of the the medial branch nerves at the   transverse process of   C5, C6, C7    2)Conscious sedation provided by MD     REASON FOR PROCEDURE: Cervical spondylosis [M47.812]     PHYSICIAN: Roland Glass MD    ASSISTANTS: None     SEDATION: Conscious sedation provided by M.D    The patient was monitored with continuous pulse oximetry, EKG, and intermittent blood pressure monitors, immediately prior to administration of sedation.  The patient was hemodynamically stable throughout the entire process was responsive to voice, and breathing spontaneously.  Supplemental O2 was provided at 2L/min via nasal cannula.  Patient was comfortable for the duration of the procedure.     There was a total of 4mg IV Midazolam and 100mcg Fentanyl titrated for the procedure    Total sedation time was >10minutes and <20minutes      MEDICATIONS INJECTED: 0.25% Bupivicaine total 6mL     LOCAL ANESTHETIC USED: Xylocaine 1% 1mL / site     ESTIMATED BLOOD LOSS: None.     COMPLICATIONS: None.     Interval history: Patient reports that he had complete relief of pain for the day of the procedure, we will proceed with the RFA     TECHNIQUE: Laying in a prone position, the patient was prepped and draped in the usual sterile fashion using ChloraPrep and fenestrated drape. The level was determined under fluoroscopic guidance. Local anesthetic was given by going down to the hub of the 27-gauge 1.25in  needle and raising a wheel. A 18-gauge 10mm curved active tip needle was introduced to the anatomic local of the medial branch at each of the stated above levels using fluoroscopy. Then sensory and motor testing was performed to confirm that the needle tips were in the correct location. Then after negative aspiration, 1 mL of 0.25% bupivacaine was injected into each level. This was followed by thermal lesioning at 80 degrees celsius for 90 seconds.       The patient tolerated the procedure well. Did not have any procedure related motor deficit at the conclusion of the procedure    The patient was monitored after the procedure. Patient was given post procedure and discharge instructions to follow at home. We will see the patient back in two weeks or the patient may call to inform of status. The patient was discharged in a stable condition

## 2023-08-22 NOTE — H&P
HPI  Patient presenting for Procedure(s) (LRB):  Right C5-7 RFA with RN IV sedation (Right)       No health changes since previous encounter    Past Medical History:   Diagnosis Date    Back pain     Neck pain     Scoliosis      Past Surgical History:   Procedure Laterality Date    INJECTION OF ANESTHETIC AGENT AROUND MEDIAL BRANCH NERVES INNERVATING CERVICAL FACET JOINT Left 5/19/2022    Procedure: bilateral T3-5 diagnostic MBB RN IV Sedation;  Surgeon: Roland Glass MD;  Location: [facility];  Service: Pain Management;  Laterality: Left;    INJECTION OF ANESTHETIC AGENT AROUND MEDIAL BRANCH NERVES INNERVATING CERVICAL FACET JOINT Bilateral 6/8/2023    Procedure: Bilateral C5-7 MBB 1st diagnostic with RN IV sedation;  Surgeon: Roland Glass MD;  Location: [facility];  Service: Pain Management;  Laterality: Bilateral;    INJECTION OF ANESTHETIC AGENT AROUND MEDIAL BRANCH NERVES INNERVATING CERVICAL FACET JOINT Bilateral 6/22/2023    Procedure: Bilateral C5-7 MBB (#2 diagnostic);  Surgeon: Roland Glass MD;  Location: [facility];  Service: Pain Management;  Laterality: Bilateral;    RADIOFREQUENCY THERMOCOAGULATION Left 7/25/2023    Procedure: Left C5-7 RFA with RN IV sedation;  Surgeon: Roland Glass MD;  Location: [facility];  Service: Pain Management;  Laterality: Left;     Review of patient's allergies indicates:   Allergen Reactions    Penicillins         Current Facility-Administered Medications on File Prior to Encounter   Medication Dose Route Frequency Provider Last Rate Last Admin    ondansetron injection 4 mg  4 mg Intravenous Once PRN Roland Glass MD         Current Outpatient Medications on File Prior to Encounter   Medication Sig Dispense Refill    albuterol (PROVENTIL/VENTOLIN HFA) 90 mcg/actuation inhaler Inhale 1-2 puffs into the lungs every 6 (six) hours as needed for Wheezing. 8 g 0    amitriptyline (ELAVIL) 25 MG tablet Take 1 tablet (25 mg total) by mouth nightly  "as needed for Insomnia. 30 tablet 1    ibuprofen (ADVIL,MOTRIN) 800 MG tablet Take 1 tablet (800 mg total) by mouth every 6 (six) hours as needed for Pain. 28 tablet 0    ondansetron (ZOFRAN) 4 MG tablet Take 4 mg by mouth every 8 (eight) hours as needed.      ondansetron (ZOFRAN-ODT) 4 MG TbDL Take 1 tablet (4 mg total) by mouth every 6 (six) hours as needed (nausea). 15 tablet 0        PMHx, PSHx, Allergies, Medications reviewed in epic    ROS negative except pain complaints in HPI    OBJECTIVE:    /76 (BP Location: Right arm, Patient Position: Lying)   Pulse 88   Temp 97.5 °F (36.4 °C) (Temporal)   Resp 16   Ht 5' 5" (1.651 m)   Wt 48.1 kg (105 lb 14.9 oz)   SpO2 95%   Breastfeeding No   BMI 17.63 kg/m²     PHYSICAL EXAMINATION:    GENERAL: Well appearing, in no acute distress, alert and oriented x3.  PSYCH:  Mood and affect appropriate.  SKIN: Skin color, texture, turgor normal, no rashes or lesions which will impact the procedure.  CV: RRR with palpation of the radial artery.  PULM: No evidence of respiratory difficulty, symmetric chest rise. Clear to auscultation.  NEURO: Cranial nerves grossly intact.    Plan:    Proceed with procedure as planned Procedure(s) (LRB):  Right C5-7 RFA with RN IV sedation (Right)    Roland Glass MD  08/22/2023            "

## 2023-08-22 NOTE — DISCHARGE INSTRUCTIONS

## 2023-08-22 NOTE — DISCHARGE SUMMARY
Discharge Note  Short Stay      SUMMARY     Admit Date: 8/22/2023    Attending Physician: Roland Glass MD        Discharge Physician: Roland Glass MD        Discharge Date: 8/22/2023 8:24 AM    Procedure(s) (LRB):  Right C5-7 RFA with RN IV sedation (Right)    Final Diagnosis: Cervical spondylosis [M47.812]    Disposition: Home or self care    Patient Instructions:   Current Discharge Medication List        CONTINUE these medications which have NOT CHANGED    Details   albuterol (PROVENTIL/VENTOLIN HFA) 90 mcg/actuation inhaler Inhale 1-2 puffs into the lungs every 6 (six) hours as needed for Wheezing.  Qty: 8 g, Refills: 0      ibuprofen (ADVIL,MOTRIN) 800 MG tablet Take 1 tablet (800 mg total) by mouth every 6 (six) hours as needed for Pain.  Qty: 28 tablet, Refills: 0      ondansetron (ZOFRAN) 4 MG tablet Take 4 mg by mouth every 8 (eight) hours as needed.      ondansetron (ZOFRAN-ODT) 4 MG TbDL Take 1 tablet (4 mg total) by mouth every 6 (six) hours as needed (nausea).  Qty: 15 tablet, Refills: 0           STOP taking these medications       amitriptyline (ELAVIL) 25 MG tablet Comments:   Reason for Stopping:                   Discharge Diagnosis: Cervical spondylosis [M47.812]  Condition on Discharge: Stable with no complications to procedure   Diet on Discharge: Same as before.  Activity: as per instruction sheet.  Discharge to: Home with a responsible adult.  Follow up: 2-4 weeks       Please call the office at [phone] if you experience any weakness or loss of sensation, fever > 101.5, pain uncontrolled with oral medications, persistent nausea/vomiting/or diarrhea, redness or drainage from the incisions, or any other worrisome concerns. If physician on call was not reached or could not communicate with our office for any reason please go to the nearest emergency department

## 2023-08-23 ENCOUNTER — PATIENT MESSAGE (OUTPATIENT)
Dept: PAIN MEDICINE | Facility: CLINIC | Age: 23
End: 2023-08-23
Payer: MEDICAID

## 2023-08-28 ENCOUNTER — PATIENT MESSAGE (OUTPATIENT)
Dept: PAIN MEDICINE | Facility: CLINIC | Age: 23
End: 2023-08-28
Payer: MEDICAID

## 2023-08-29 ENCOUNTER — TELEPHONE (OUTPATIENT)
Dept: PAIN MEDICINE | Facility: CLINIC | Age: 23
End: 2023-08-29
Payer: MEDICAID

## 2023-08-29 NOTE — TELEPHONE ENCOUNTER
Returned pt call.Informed her that Dr. Glass is in procedures at this time and I will reach out to him regarding her request for something to help her sleep. Pt verbalized understanding.

## 2023-08-29 NOTE — TELEPHONE ENCOUNTER
----- Message from Marie Krishnamurthy sent at 8/29/2023 10:58 AM CDT -----  Contact: Columba Burnette called to check the status of her request for something to help her sleep. Please call her back at [phone].    Thanks  TS

## 2023-08-29 NOTE — TELEPHONE ENCOUNTER
----- Message from Roland Glass MD sent at 8/29/2023  3:20 PM CDT -----  Contact: Columba  We can offer her a muscle relaxant that is sedating to help her with her sleep if she is interested, likely would utilize tizanidine.  ----- Message -----  From: Morena Khan LPN  Sent: 8/29/2023  11:27 AM CDT  To: Roland Glass MD    Please advise.  ----- Message -----  From: Marie Krishnamurthy  Sent: 8/29/2023  10:59 AM CDT  To: [facility] Staff    Pt called to check the status of her request for something to help her sleep. Please call her back at [phone].    Thanks  TS

## 2023-08-29 NOTE — TELEPHONE ENCOUNTER
Called pt to informed her that  states We can offer her a muscle relaxant that is sedating to help her with her sleep if she is interested, likely would utilize tizanidine. Pt states she will try that and will like it to go to [facility]. Provider notified. All questions answered.

## 2023-08-30 ENCOUNTER — PATIENT MESSAGE (OUTPATIENT)
Dept: PAIN MEDICINE | Facility: CLINIC | Age: 23
End: 2023-08-30
Payer: MEDICAID

## 2023-08-30 RX ORDER — TIZANIDINE 4 MG/1
4 TABLET ORAL NIGHTLY PRN
Qty: 30 TABLET | Refills: 1 | Status: SHIPPED | OUTPATIENT
Start: 2023-08-30 | End: 2023-10-29

## 2023-09-18 ENCOUNTER — TELEPHONE (OUTPATIENT)
Dept: PAIN MEDICINE | Facility: CLINIC | Age: 23
End: 2023-09-18
Payer: MEDICAID

## 2023-09-18 ENCOUNTER — PATIENT MESSAGE (OUTPATIENT)
Dept: PAIN MEDICINE | Facility: CLINIC | Age: 23
End: 2023-09-18
Payer: MEDICAID

## 2023-09-18 NOTE — TELEPHONE ENCOUNTER
----- Message from Julieta Saunders sent at 9/18/2023 11:11 AM CDT -----  Contact: patient  [phone]  Patient is returning a phone call.  Who left a message for the patient: Dr. Glass  Does patient know what this is regarding:  back pain, patient sent pictures  Would you like a call back, or a response through your [facility] portal?:  by phone  Comments:

## 2023-09-18 NOTE — TELEPHONE ENCOUNTER
Spoke with patient, informed her that the message was sent to the provider and return call with response.

## 2023-10-19 ENCOUNTER — OFFICE VISIT (OUTPATIENT)
Dept: PAIN MEDICINE | Facility: CLINIC | Age: 23
End: 2023-10-19
Payer: MEDICAID

## 2023-10-19 DIAGNOSIS — M79.12 MYALGIA OF AUXILIARY MUSCLES, HEAD AND NECK: ICD-10-CM

## 2023-10-19 DIAGNOSIS — G89.4 CHRONIC PAIN SYNDROME: ICD-10-CM

## 2023-10-19 DIAGNOSIS — G89.4 CHRONIC PAIN DISORDER: ICD-10-CM

## 2023-10-19 DIAGNOSIS — F41.9 ANXIETY: ICD-10-CM

## 2023-10-19 DIAGNOSIS — M47.812 CERVICAL SPONDYLOSIS: ICD-10-CM

## 2023-10-19 DIAGNOSIS — F39 MOOD DISORDER: ICD-10-CM

## 2023-10-19 DIAGNOSIS — M79.7 FIBROMYALGIA: Primary | ICD-10-CM

## 2023-10-19 PROCEDURE — 99214 OFFICE O/P EST MOD 30 MIN: CPT | Mod: 95,,, | Performed by: PHYSICAL MEDICINE & REHABILITATION

## 2023-10-19 PROCEDURE — 99214 PR OFFICE/OUTPT VISIT, EST, LEVL IV, 30-39 MIN: ICD-10-PCS | Mod: 95,,, | Performed by: PHYSICAL MEDICINE & REHABILITATION

## 2023-10-19 RX ORDER — NORTRIPTYLINE HYDROCHLORIDE 10 MG/1
10 CAPSULE ORAL NIGHTLY PRN
Qty: 30 CAPSULE | Refills: 1 | Status: SHIPPED | OUTPATIENT
Start: 2023-10-19 | End: 2024-01-26
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
## 2023-10-25 ENCOUNTER — PATIENT MESSAGE (OUTPATIENT)
Dept: PAIN MEDICINE | Facility: CLINIC | Age: 23
End: 2023-10-25
Payer: MEDICAID

## 2023-11-03 ENCOUNTER — PATIENT MESSAGE (OUTPATIENT)
Dept: PAIN MEDICINE | Facility: CLINIC | Age: 23
End: 2023-11-03
Payer: MEDICAID

## 2023-11-09 RX ORDER — CYCLOBENZAPRINE HCL 10 MG
10 TABLET ORAL 3 TIMES DAILY PRN
Qty: 90 TABLET | Refills: 2 | Status: SHIPPED | OUTPATIENT
Start: 2023-11-09 | End: 2024-02-07

## 2024-01-12 ENCOUNTER — HOSPITAL ENCOUNTER (OUTPATIENT)
Dept: RADIOLOGY | Facility: HOSPITAL | Age: 24
Discharge: HOME | End: 2024-01-12
Attending: NURSE PRACTITIONER
Payer: MEDICAID

## 2024-01-12 DIAGNOSIS — M79.642 PAIN IN LEFT HAND: ICD-10-CM

## 2024-01-12 DIAGNOSIS — M79.641 PAIN IN RIGHT HAND: ICD-10-CM

## 2024-01-12 PROCEDURE — 73130 X-RAY EXAM OF HAND: CPT | Mod: 26,50,, | Performed by: RADIOLOGY

## 2024-01-12 PROCEDURE — 73130 X-RAY EXAM OF HAND: CPT | Mod: TC,50,PO

## 2024-01-16 ENCOUNTER — HOSPITAL ENCOUNTER (OUTPATIENT)
Dept: RADIOLOGY | Facility: HOSPITAL | Age: 24
Discharge: HOME | End: 2024-01-16
Attending: NURSE PRACTITIONER
Payer: MEDICAID

## 2024-01-16 DIAGNOSIS — N60.12 DIFFUSE CYSTIC MASTOPATHY OF LEFT BREAST: ICD-10-CM

## 2024-01-16 DIAGNOSIS — N64.4 MASTODYNIA: ICD-10-CM

## 2024-01-16 DIAGNOSIS — N60.19 FIBROCYSTIC DISEASE OF BREAST: ICD-10-CM

## 2024-01-16 PROCEDURE — 76642 ULTRASOUND BREAST LIMITED: CPT | Mod: 26,LT,, | Performed by: RADIOLOGY

## 2024-01-16 PROCEDURE — 76642 ULTRASOUND BREAST LIMITED: CPT | Mod: TC,LT

## 2024-01-24 ENCOUNTER — TELEPHONE (OUTPATIENT)
Dept: PAIN MEDICINE | Facility: CLINIC | Age: 24
End: 2024-01-24
Payer: MEDICAID

## 2024-01-24 NOTE — TELEPHONE ENCOUNTER
Reached out to patient to reschedule appointment because the provider will be out of clinic.  Apt has been made . All questions answered.     Eliazar Alcantara  Medical

## 2024-01-25 ENCOUNTER — TELEPHONE (OUTPATIENT)
Dept: PAIN MEDICINE | Facility: CLINIC | Age: 24
End: 2024-01-25
Payer: MEDICAID

## 2024-01-25 NOTE — TELEPHONE ENCOUNTER
----- Message from Bridget Hill sent at 1/25/2024  8:30 AM CST -----  Type:  Needs Medical Advice    Who Called: pt  Symptoms (please be specific): neck pain    How long has patient had these symptoms:  1 week     Would the patient rather a call back or a response via [employer]? Call back   Best Call Back Number: [phone]  Additional Information: pt is requesting to get a return call to discuss

## 2024-01-26 ENCOUNTER — OFFICE VISIT (OUTPATIENT)
Dept: PAIN MEDICINE | Facility: CLINIC | Age: 24
End: 2024-01-26
Payer: MEDICAID

## 2024-01-26 DIAGNOSIS — M79.7 FIBROMYALGIA: ICD-10-CM

## 2024-01-26 DIAGNOSIS — M47.812 CERVICAL SPONDYLOSIS: ICD-10-CM

## 2024-01-26 DIAGNOSIS — G89.4 CHRONIC PAIN SYNDROME: ICD-10-CM

## 2024-01-26 DIAGNOSIS — M79.12 MYALGIA OF AUXILIARY MUSCLES, HEAD AND NECK: Primary | ICD-10-CM

## 2024-01-26 DIAGNOSIS — F39 MOOD DISORDER: ICD-10-CM

## 2024-01-26 PROCEDURE — 99214 OFFICE O/P EST MOD 30 MIN: CPT | Mod: 95,,, | Performed by: PHYSICAL MEDICINE & REHABILITATION

## 2024-01-26 RX ORDER — NORTRIPTYLINE HYDROCHLORIDE 25 MG/1
25 CAPSULE ORAL NIGHTLY PRN
Qty: 30 CAPSULE | Refills: 2 | Status: SHIPPED | OUTPATIENT
Start: 2024-01-26 | End: 2024-04-10 | Stop reason: SDUPTHER
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
## 2024-02-13 ENCOUNTER — TELEPHONE (OUTPATIENT)
Dept: PAIN MEDICINE | Facility: CLINIC | Age: 24
End: 2024-02-13
Payer: MEDICAID

## 2024-02-13 DIAGNOSIS — G44.86 CERVICOGENIC HEADACHE: Primary | ICD-10-CM

## 2024-02-13 NOTE — TELEPHONE ENCOUNTER
Returned pt call. Informed her that her referral was sent. Pt verbalized understanding. All questions answered.

## 2024-02-13 NOTE — TELEPHONE ENCOUNTER
----- Message from Disha Espino sent at 2/13/2024  1:14 PM CST -----  Contact: pt  Pt is calling in regard to see if  can refer her to a neurologist b/c of her pain.  Please call her back [phone] thanks/mpd

## 2024-02-22 ENCOUNTER — TELEPHONE (OUTPATIENT)
Dept: PAIN MEDICINE | Facility: CLINIC | Age: 24
End: 2024-02-22
Payer: MEDICAID

## 2024-02-22 NOTE — TELEPHONE ENCOUNTER
----- Message from Olinda Vicente sent at 2/22/2024 11:55 AM CST -----  Contact: self [phone]  Patient called in this morning to submit another provider for Neurology that takes her insurance. Please send new referral to [facility]. Please call back [phone]. Thanks denisse

## 2024-02-29 ENCOUNTER — TELEPHONE (OUTPATIENT)
Dept: NEUROLOGY | Facility: CLINIC | Age: 24
End: 2024-02-29
Payer: MEDICAID

## 2024-04-10 ENCOUNTER — OFFICE VISIT (OUTPATIENT)
Dept: PAIN MEDICINE | Facility: CLINIC | Age: 24
End: 2024-04-10
Payer: MEDICAID

## 2024-04-10 DIAGNOSIS — F41.9 ANXIETY: ICD-10-CM

## 2024-04-10 DIAGNOSIS — M79.7 FIBROMYALGIA: ICD-10-CM

## 2024-04-10 DIAGNOSIS — M47.812 CERVICAL SPONDYLOSIS: ICD-10-CM

## 2024-04-10 DIAGNOSIS — G44.86 CERVICOGENIC HEADACHE: Primary | ICD-10-CM

## 2024-04-10 DIAGNOSIS — G89.4 CHRONIC PAIN SYNDROME: ICD-10-CM

## 2024-04-10 DIAGNOSIS — M79.12 MYALGIA OF AUXILIARY MUSCLES, HEAD AND NECK: ICD-10-CM

## 2024-04-10 PROCEDURE — 99213 OFFICE O/P EST LOW 20 MIN: CPT | Mod: 95,,, | Performed by: PHYSICAL MEDICINE & REHABILITATION

## 2024-04-10 RX ORDER — NORTRIPTYLINE HYDROCHLORIDE 50 MG/1
50 CAPSULE ORAL NIGHTLY PRN
Qty: 30 CAPSULE | Refills: 2 | Status: SHIPPED | OUTPATIENT
Start: 2024-04-10 | End: 2024-05-13
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
## 2024-04-12 ENCOUNTER — PATIENT MESSAGE (OUTPATIENT)
Dept: PAIN MEDICINE | Facility: CLINIC | Age: 24
End: 2024-04-12
Payer: MEDICAID

## 2024-05-13 ENCOUNTER — OFFICE VISIT (OUTPATIENT)
Dept: NEUROLOGY | Facility: CLINIC | Age: 24
End: 2024-05-13
Payer: MEDICAID

## 2024-05-13 VITALS
DIASTOLIC BLOOD PRESSURE: 85 MMHG | HEART RATE: 100 BPM | WEIGHT: 108 LBS | SYSTOLIC BLOOD PRESSURE: 122 MMHG | BODY MASS INDEX: 17.98 KG/M2

## 2024-05-13 DIAGNOSIS — Z71.89 COUNSELING ON HEALTH PROMOTION AND DISEASE PREVENTION: ICD-10-CM

## 2024-05-13 DIAGNOSIS — M54.81 BILATERAL OCCIPITAL NEURALGIA: ICD-10-CM

## 2024-05-13 DIAGNOSIS — G47.00 INSOMNIA, UNSPECIFIED TYPE: ICD-10-CM

## 2024-05-13 DIAGNOSIS — R11.0 NAUSEA: ICD-10-CM

## 2024-05-13 DIAGNOSIS — G44.86 CERVICOGENIC HEADACHE: ICD-10-CM

## 2024-05-13 DIAGNOSIS — M54.2 CERVICALGIA: Primary | ICD-10-CM

## 2024-05-13 PROCEDURE — 99999 PR PBB SHADOW E&M-EST. PATIENT-LVL IV: CPT | Mod: PBBFAC,,,

## 2024-05-13 PROCEDURE — 3074F SYST BP LT 130 MM HG: CPT | Mod: CPTII,,,

## 2024-05-13 PROCEDURE — 99205 OFFICE O/P NEW HI 60 MIN: CPT | Mod: S$PBB,,,

## 2024-05-13 PROCEDURE — 99214 OFFICE O/P EST MOD 30 MIN: CPT | Mod: PBBFAC

## 2024-05-13 PROCEDURE — 1159F MED LIST DOCD IN RCRD: CPT | Mod: CPTII,,,

## 2024-05-13 PROCEDURE — 3008F BODY MASS INDEX DOCD: CPT | Mod: CPTII,,,

## 2024-05-13 PROCEDURE — 3079F DIAST BP 80-89 MM HG: CPT | Mod: CPTII,,,

## 2024-05-13 RX ORDER — FLUOXETINE HYDROCHLORIDE 20 MG/1
20 CAPSULE ORAL
COMMUNITY
Start: 2024-04-18

## 2024-05-13 RX ORDER — MEDROXYPROGESTERONE ACETATE 150 MG/ML
150 INJECTION, SUSPENSION INTRAMUSCULAR
COMMUNITY
Start: 2024-04-19

## 2024-05-13 RX ORDER — HYDROXYZINE PAMOATE 25 MG/1
25-50 CAPSULE ORAL DAILY PRN
COMMUNITY
Start: 2024-04-18

## 2024-05-13 RX ORDER — ALPRAZOLAM 1 MG/1
1 TABLET ORAL ONCE AS NEEDED
Qty: 1 TABLET | Refills: 0 | Status: SHIPPED | OUTPATIENT
Start: 2024-05-13 | End: 2024-05-13

## 2024-05-13 RX ORDER — ZOLPIDEM TARTRATE 5 MG/1
TABLET ORAL
COMMUNITY
Start: 2024-04-18

## 2024-05-13 RX ORDER — CYCLOBENZAPRINE HCL 10 MG
10 TABLET ORAL
COMMUNITY
Start: 2024-02-21 | End: 2024-06-03

## 2024-05-13 RX ORDER — ONDANSETRON 4 MG/1
4 TABLET, ORALLY DISINTEGRATING ORAL 2 TIMES DAILY
Qty: 21 TABLET | Refills: 2 | Status: SHIPPED | OUTPATIENT
Start: 2024-05-13 | End: 2024-06-14

## 2024-05-13 NOTE — PROGRESS NOTES
[facility] - NEUROLOGY 7TH FL [facility], [place]    Date: 5/13/24  Patient Name: Marilee Dumas   MRN: [id number]   PCP: Manuel Kimble (Inactive)  Referring Provider: Self, Aaareferral    Assessment:   Marilee Dumas is a 24 y.o. female presenting for evaluation of daily neck pain and headache. She has tried to manage her neck and head-ache for years. She has tried numerous medications without relief. These meds are in different classes that have been grossly ineffective as she continues to suffer with daily pain and functional limitations. Meds include venlafaxine, nortriptyline, ibuprofen, gabapentin, lyrica, amitriptyline, celecoxib, duloxetine, and baclofen.     Exam is significant for thin female with scoliosis, most prominent in lower thoracic with rightward shift. There is bilateral tenderness to palpation of the greater and lesser occipital nerves.  Worse on the left.      The patient's exam and history is likely due to her scoliosis and cervical strain that has progressed to include bilateral occipital neuralgia and mixed pattern of migraine/tension type headaches.  We discussed the possibility of adding a medication; however, she has currently not interested due to the side effects.  She will continue following with pain management.   Currently, she is interested in a conservative approach with referrals to PT and chiropractic. Will repeat MRI cervical without contrast to evaluate for cervical disease to explain chronic pain.     Independently reviewed and interpreted MRI cervical spine w/o con 9/10/21 : Normal     Plan:   --MRI cervical spine w/o con with PRN xanax for procedure anxiety  --Referral to PT and chiropractic  --zofran for prn nausea  --RTC in 3 months, can be virtual  --Discussed sleep hygiene and counseled on insomnia   --Discussed ways to support loosening stiffness and pain in back including heat therapy and stretching    Problem List Items  Addressed This Visit          Neuro    Cervicogenic headache    Relevant Orders    Ambulatory referral/consult to Chiropractic    Ambulatory referral/consult to Physical/Occupational Therapy    Ambulatory referral/consult to Physical/Occupational Therapy    MRI Cervical Spine Without Contrast       Orthopedic    Cervicalgia - Primary    Relevant Medications    ALPRAZolam (XANAX) 1 MG tablet    Other Relevant Orders    Ambulatory referral/consult to Chiropractic    Ambulatory referral/consult to Physical/Occupational Therapy    Ambulatory referral/consult to Physical/Occupational Therapy    MRI Cervical Spine Without Contrast    Bilateral occipital neuralgia     Other Visit Diagnoses       Nausea        Relevant Medications    ondansetron (ZOFRAN-ODT) 4 MG TbDL    Insomnia, unspecified type        Counseling on health promotion and disease prevention                Tony Flynn MD    Patient note was created using MModal Dictation.  Any errors in syntax or even information may not have been identified and edited on initial review prior to signing this note.  Subjective:   Patient seen in consultation at the request of Self, Aaareferral for the evaluation of headache. A copy of this note will be sent to the referring physician.        HPI:   Ms. Marilee Dumas is a 24 y.o. female w/ multilevel scoliosis, chronic back pain following with pain management (Hx of ablations (b/l C5-7x2), medial branch blocks (b/l T3-5 x1, b/l C5-7x2)), fibromyalgia follows with Rheumatology, asthma, chronic nausea/vomiting, anxiety presenting for evaluation of neck pain. When her neck pain is really bad, it develops into a  headache. Today she presents with her partner who offers complementary information.    Headache history:   Age of onset - 17 years after fall on neck  Location - neck and travels up primarily but down too  Nature of pain -  deep pressure and burning  Duration of headache - all day and constant  Time to peak  intensity - 1 hour  Pain scale - range of intensity - 7/10  Frequency -  daily  Status Migrainosus history - N/A  Time of day of most headaches- evening after work     Associated symptoms with the headache:   Meningeal symptoms - exercise intolerance  Nausea/vomitting    Nasal drainage   Visual blurriness   Pallor  Dizziness   Confusion  Impaired concentration    Pain worsened with physical activity    Neck pain        Headache Triggers: overexertion or sitting too long     Other comorbid conditions:  Anxiety - Yes  Motion sickness symptom - Variable but not bad     Treatment history:  Resolution of headache with sleep -  no  Meds tried:  Nortriptyline 50mg- not working  NSAID - not well  Lyrica 75mg BID  Gabapentin 300mg TID - works a little bit      Headache risk factors:   H/o TBI  -  at 17 year landed on neck falling off swing set and suspect that is when scoliosis got worse  H/o Meningitis  -  No  F/h Aneurysms  - No      Social hx:   Full time       PAST MEDICAL HISTORY:  Past Medical History:   Diagnosis Date    Back pain     Neck pain     Scoliosis        PAST SURGICAL HISTORY:  Past Surgical History:   Procedure Laterality Date    INJECTION OF ANESTHETIC AGENT AROUND MEDIAL BRANCH NERVES INNERVATING CERVICAL FACET JOINT Left 5/19/2022    Procedure: bilateral T3-5 diagnostic MBB RN IV Sedation;  Surgeon: Roland Glass MD;  Location: [facility];  Service: Pain Management;  Laterality: Left;    INJECTION OF ANESTHETIC AGENT AROUND MEDIAL BRANCH NERVES INNERVATING CERVICAL FACET JOINT Bilateral 6/8/2023    Procedure: Bilateral C5-7 MBB 1st diagnostic with RN IV sedation;  Surgeon: Roland Glass MD;  Location: [facility];  Service: Pain Management;  Laterality: Bilateral;    INJECTION OF ANESTHETIC AGENT AROUND MEDIAL BRANCH NERVES INNERVATING CERVICAL FACET JOINT Bilateral 6/22/2023    Procedure: Bilateral C5-7 MBB (#2 diagnostic);  Surgeon: Roland Glass MD;  Location: [facility];   Service: Pain Management;  Laterality: Bilateral;    RADIOFREQUENCY THERMOCOAGULATION Left 7/25/2023    Procedure: Left C5-7 RFA with RN IV sedation;  Surgeon: Roland Glass MD;  Location: [facility];  Service: Pain Management;  Laterality: Left;    RADIOFREQUENCY THERMOCOAGULATION Right 8/22/2023    Procedure: Right C5-7 RFA with RN IV sedation;  Surgeon: Roland Glass MD;  Location: [facility];  Service: Pain Management;  Laterality: Right;       CURRENT MEDS:  Current Outpatient Medications   Medication Sig Dispense Refill    cyclobenzaprine (FLEXERIL) 10 MG tablet Take 10 mg by mouth as needed. As needed      FLUoxetine 20 MG capsule Take 20 mg by mouth.      hydrOXYzine pamoate (VISTARIL) 25 MG Cap Take 25-50 mg by mouth daily as needed.      ibuprofen (ADVIL,MOTRIN) 800 MG tablet Take 1 tablet (800 mg total) by mouth every 6 (six) hours as needed for Pain. 28 tablet 0    medroxyPROGESTERone (DEPO-PROVERA) 150 mg/mL injection Inject 150 mg into the muscle every 3 (three) months.      zolpidem (AMBIEN) 5 MG Tab       albuterol (PROVENTIL/VENTOLIN HFA) 90 mcg/actuation inhaler Inhale 1-2 puffs into the lungs every 6 (six) hours as needed for Wheezing. 8 g 0    ALPRAZolam (XANAX) 1 MG tablet Take 1 tablet (1 mg total) by mouth once as needed for Anxiety (for MRI). 1 tablet 0    ondansetron (ZOFRAN-ODT) 4 MG TbDL Take 1 tablet (4 mg total) by mouth 2 (two) times daily. 21 tablet 2     No current facility-administered medications for this visit.     Facility-Administered Medications Ordered in Other Visits   Medication Dose Route Frequency Provider Last Rate Last Admin    ondansetron injection 4 mg  4 mg Intravenous Once PRN Roland Glass MD           ALLERGIES:  Review of patient's allergies indicates:   Allergen Reactions    Penicillins        FAMILY HISTORY:  No family history on file.    SOCIAL HISTORY:  Social History     Tobacco Use    Smoking status: Every Day     Types: Vaping with  nicotine    Smokeless tobacco: Former   Substance Use Topics    Alcohol use: Never    Drug use: Yes     Types: Marijuana     Comment: PRESCRIBED       Review of Systems:  12 system review of systems is negative except for the symptoms mentioned in HPI.      Objective:     Vitals:    05/13/24 1310   BP: 122/85   Pulse: 100   Weight: 49 kg (108 lb 0.4 oz)     General: NAD, well nourished   Eyes: no tearing, discharge, no erythema   ENT: moist mucous membranes of the oral cavity, nares patent    Neck: Supple, full range of motion  Cardiovascular: Warm and well perfused, pulses equal and symmetrical  Lungs: Normal work of breathing, normal chest wall excursions  Skin: No rash, lesions, or breakdown on exposed skin  Psychiatry: Mood and affect are appropriate   Abdomen: soft, non tender, non distended  Extremeties: No cyanosis, clubbing or edema.    Neurological   MENTAL STATUS: Alert and oriented to person, place, and time. Attention and concentration within normal limits. Speech without dysarthria, able to name and repeat without difficulty. Recent and remote memory within normal limits   CRANIAL NERVES: Visual fields intact. PERRL. EOMI. Facial sensation intact. Face symmetrical. Hearing grossly intact. Full shoulder shrug bilaterally. Tongue protrudes midline   SENSORY: Sensation is intact to light touch throughout.  Joint position perception intact. Negative Romberg.   MOTOR: Normal bulk and tone. No pronator drift.  5/5 deltoid, biceps, triceps, interosseous, hand  bilaterally. 5/5 iliopsoas, knee extension/flexion, foot dorsi/plantarflexion bilaterally.    REFLEXES: Symmetric and 2+ throughout. Toes down going bilaterally.   CEREBELLAR/COORDINATION/GAIT: Gait steady with normal arm swing and stride length.  Heel to shin intact. Finger to nose intact. Normal rapid alternating movements.   Tenderness to palpation over greater occipital and lesser occipital nerves bilaterally  Mild kyphosis and scoliosis  noted

## 2024-05-15 ENCOUNTER — PATIENT MESSAGE (OUTPATIENT)
Dept: PAIN MEDICINE | Facility: CLINIC | Age: 24
End: 2024-05-15
Payer: MEDICAID

## 2024-05-27 ENCOUNTER — TELEPHONE (OUTPATIENT)
Dept: PAIN MEDICINE | Facility: CLINIC | Age: 24
End: 2024-05-27
Payer: MEDICAID

## 2024-06-03 ENCOUNTER — OFFICE VISIT (OUTPATIENT)
Dept: PAIN MEDICINE | Facility: CLINIC | Age: 24
End: 2024-06-03
Payer: MEDICAID

## 2024-06-03 ENCOUNTER — PATIENT MESSAGE (OUTPATIENT)
Dept: NEUROLOGY | Facility: CLINIC | Age: 24
End: 2024-06-03
Payer: MEDICAID

## 2024-06-03 ENCOUNTER — TELEPHONE (OUTPATIENT)
Dept: PAIN MEDICINE | Facility: CLINIC | Age: 24
End: 2024-06-03
Payer: MEDICAID

## 2024-06-03 ENCOUNTER — TELEPHONE (OUTPATIENT)
Dept: PAIN MEDICINE | Facility: CLINIC | Age: 24
End: 2024-06-03

## 2024-06-03 DIAGNOSIS — M79.7 FIBROMYALGIA: ICD-10-CM

## 2024-06-03 DIAGNOSIS — M47.812 CERVICAL SPONDYLOSIS: ICD-10-CM

## 2024-06-03 DIAGNOSIS — G44.86 CERVICOGENIC HEADACHE: Primary | ICD-10-CM

## 2024-06-03 DIAGNOSIS — M79.12 MYALGIA OF AUXILIARY MUSCLES, HEAD AND NECK: ICD-10-CM

## 2024-06-03 DIAGNOSIS — F41.9 ANXIETY: ICD-10-CM

## 2024-06-03 DIAGNOSIS — G89.4 CHRONIC PAIN SYNDROME: ICD-10-CM

## 2024-06-03 DIAGNOSIS — G89.4 CHRONIC PAIN DISORDER: ICD-10-CM

## 2024-06-03 PROCEDURE — 99214 OFFICE O/P EST MOD 30 MIN: CPT | Mod: 95,,, | Performed by: PHYSICAL MEDICINE & REHABILITATION

## 2024-06-03 PROCEDURE — G2211 COMPLEX E/M VISIT ADD ON: HCPCS | Mod: 95,,, | Performed by: PHYSICAL MEDICINE & REHABILITATION

## 2024-06-03 RX ORDER — METHOCARBAMOL 500 MG/1
500 TABLET, FILM COATED ORAL 3 TIMES DAILY PRN
Qty: 90 TABLET | Refills: 2 | Status: SHIPPED | OUTPATIENT
Start: 2024-06-03

## 2024-06-03 RX ORDER — CYCLOBENZAPRINE HCL 10 MG
10 TABLET ORAL 3 TIMES DAILY PRN
Qty: 90 TABLET | Refills: 2 | Status: SHIPPED | OUTPATIENT
Start: 2024-06-03 | End: 2024-09-01

## 2024-06-03 RX ORDER — NORTRIPTYLINE HYDROCHLORIDE 50 MG/1
50 CAPSULE ORAL NIGHTLY PRN
Qty: 30 CAPSULE | Refills: 2 | Status: SHIPPED | OUTPATIENT
Start: 2024-06-03 | End: 2025-06-03

## 2024-06-03 NOTE — Clinical Note
Pain Provider: Maria Luisa Patient Name: Marilee Dumas MRN: [id number] Case: CERVICAL RFA Level: C5, C6, and C7 Laterality: bilateral Anticoagulation:  None  Follow up 4-6 weeks post with anyone

## 2024-06-03 NOTE — TELEPHONE ENCOUNTER
Call to schedule pt injection with Dr Glass pt was schedule for 6/18/24.    .Margot Cespedes [facility]
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
## 2024-06-03 NOTE — TELEPHONE ENCOUNTER
----- Message from Roland Glass MD sent at 6/3/2024  8:23 AM CDT -----  Pain Provider: Maria Luisa  Patient Name: Marilee Dumas  MRN: [id number]  Case: CERVICAL RFA  Level: C5, C6, and C7  Laterality: bilateral  Anticoagulation:  None    Follow up 4-6 weeks post with anyone

## 2024-06-05 ENCOUNTER — HOSPITAL ENCOUNTER (OUTPATIENT)
Dept: RADIOLOGY | Facility: HOSPITAL | Age: 24
Discharge: HOME | End: 2024-06-05
Payer: MEDICAID

## 2024-06-05 DIAGNOSIS — G44.86 CERVICOGENIC HEADACHE: ICD-10-CM

## 2024-06-05 DIAGNOSIS — M54.2 CERVICALGIA: ICD-10-CM

## 2024-06-05 PROCEDURE — 72141 MRI NECK SPINE W/O DYE: CPT | Mod: TC

## 2024-06-05 PROCEDURE — 72141 MRI NECK SPINE W/O DYE: CPT | Mod: 26,,, | Performed by: RADIOLOGY

## 2024-06-05 NOTE — PRE-PROCEDURE INSTRUCTIONS
Spoke with patient regarding procedure scheduled on 6.18     Arrival time 0700     Has patient been sick with fever or on antibiotics within the last 7 days? No     Does the patient have any open wounds, sores or rashes? No     Does the patient have any recent fractures? no     Has patient received a vaccination within the last 7 days? No     Received the COVID vaccination? yes     Has the patient stopped all medications as directed? na      Does patient have a pacemaker, defibrillator, or implantable stimulator? No     Does the patient have a ride to and from procedure and someone reliable to remain with patient?  GALE     Is the patient diabetic? no     Does the patient have sleep apnea? Or use O2 at home? no     Is the patient receiving sedation? yes     Is the patient instructed to remain NPO beginning at midnight the night before their procedure? yes     Procedure location confirmed with patient? Yes     Covid- Denies signs/symptoms. Instructed to notify PAT/MD if any changes.

## 2024-06-18 ENCOUNTER — TELEPHONE (OUTPATIENT)
Dept: PAIN MEDICINE | Facility: CLINIC | Age: 24
End: 2024-06-18
Payer: MEDICAID

## 2024-06-18 ENCOUNTER — HOSPITAL ENCOUNTER (OUTPATIENT)
Facility: HOSPITAL | Age: 24
Discharge: HOME | End: 2024-06-18
Attending: PHYSICAL MEDICINE & REHABILITATION | Admitting: PHYSICAL MEDICINE & REHABILITATION
Payer: MEDICAID

## 2024-06-18 VITALS
BODY MASS INDEX: 18.95 KG/M2 | HEIGHT: 65 IN | OXYGEN SATURATION: 100 % | TEMPERATURE: 97 F | WEIGHT: 113.75 LBS | DIASTOLIC BLOOD PRESSURE: 68 MMHG | HEART RATE: 101 BPM | SYSTOLIC BLOOD PRESSURE: 124 MMHG | RESPIRATION RATE: 16 BRPM

## 2024-06-18 DIAGNOSIS — M47.812 CERVICAL SPONDYLOSIS: Primary | ICD-10-CM

## 2024-06-18 LAB
B-HCG UR QL: NEGATIVE
CTP QC/QA: YES

## 2024-06-18 PROCEDURE — 64633 DESTROY CERV/THOR FACET JNT: CPT | Mod: RT,,, | Performed by: PHYSICAL MEDICINE & REHABILITATION

## 2024-06-18 PROCEDURE — 64633 DESTROY CERV/THOR FACET JNT: CPT | Mod: RT | Performed by: PHYSICAL MEDICINE & REHABILITATION

## 2024-06-18 PROCEDURE — 64634 DESTROY C/TH FACET JNT ADDL: CPT | Mod: RT | Performed by: PHYSICAL MEDICINE & REHABILITATION

## 2024-06-18 PROCEDURE — 63600175 PHARM REV CODE 636 W HCPCS: Mod: JZ,JG | Performed by: PHYSICAL MEDICINE & REHABILITATION

## 2024-06-18 PROCEDURE — 81025 URINE PREGNANCY TEST: CPT | Performed by: PHYSICAL MEDICINE & REHABILITATION

## 2024-06-18 PROCEDURE — 99152 MOD SED SAME PHYS/QHP 5/>YRS: CPT | Performed by: PHYSICAL MEDICINE & REHABILITATION

## 2024-06-18 PROCEDURE — 64634 DESTROY C/TH FACET JNT ADDL: CPT | Mod: RT,,, | Performed by: PHYSICAL MEDICINE & REHABILITATION

## 2024-06-18 RX ORDER — ONDANSETRON HYDROCHLORIDE 2 MG/ML
4 INJECTION, SOLUTION INTRAVENOUS ONCE AS NEEDED
Status: DISCONTINUED | OUTPATIENT
Start: 2024-06-18 | End: 2024-06-18 | Stop reason: HOSPADM

## 2024-06-18 RX ORDER — FENTANYL CITRATE 50 UG/ML
INJECTION, SOLUTION INTRAMUSCULAR; INTRAVENOUS
Status: DISCONTINUED | OUTPATIENT
Start: 2024-06-18 | End: 2024-06-18 | Stop reason: HOSPADM

## 2024-06-18 RX ORDER — BUPIVACAINE HYDROCHLORIDE 2.5 MG/ML
INJECTION, SOLUTION EPIDURAL; INFILTRATION; INTRACAUDAL
Status: DISCONTINUED | OUTPATIENT
Start: 2024-06-18 | End: 2024-06-18 | Stop reason: HOSPADM

## 2024-06-18 RX ORDER — MIDAZOLAM HYDROCHLORIDE 1 MG/ML
INJECTION, SOLUTION INTRAMUSCULAR; INTRAVENOUS
Status: DISCONTINUED | OUTPATIENT
Start: 2024-06-18 | End: 2024-06-18 | Stop reason: HOSPADM

## 2024-06-18 NOTE — TELEPHONE ENCOUNTER
Reached out to patient to reschedule appointment because the provider will be out of clinic. No answer. Left message on patients voice mail to call back at earliest convenience to schedule jenny [facility]

## 2024-06-18 NOTE — OP NOTE
Cervical Medial nerve branch block radiofrequency ablation Under Fluoroscopy     Time-out taken to identify patient and procedure side prior to starting the procedure.     06/18/2024    Patient has clinical and imaging findings suggestive of recurrent facet mediated pain. Patient has undergone previous RFAs at specified levels with at least 50% relief for at least 6 months. Successful diagnostic medial branch blocks have been completed within the past 2 years.    For supporting clinical documentation, please see most recent clinic and telephone notes.     PROCEDURE: Right radiofrequency ablation of the the medial branch nerves at the   transverse process of   C5, C6, C7    2)Conscious sedation provided by MD     REASON FOR PROCEDURE: Cervical spondylosis [M47.812]     PHYSICIAN: Roland Glass MD    ASSISTANTS: None     SEDATION: Conscious sedation provided by M.D    The patient was monitored with continuous pulse oximetry, EKG, and intermittent blood pressure monitors, immediately prior to administration of sedation.  The patient was hemodynamically stable throughout the entire process was responsive to voice, and breathing spontaneously.  Supplemental O2 was provided at 2L/min via nasal cannula.  Patient was comfortable for the duration of the procedure.     There was a total of 4mg IV Midazolam and  200 mcg Fentanyl titrated for the procedure    Total sedation time was >10minutes and <20minutes      MEDICATIONS INJECTED: 0.25% Bupivicaine total 6mL     LOCAL ANESTHETIC USED: Xylocaine 1% 1mL / site     ESTIMATED BLOOD LOSS: None.     COMPLICATIONS: Originally planned for bilateral RFA, performed only the right side due to patient's anxiety and comfort.     Interval history: Patient reports that he had complete relief of pain for the day of the procedure, we will proceed with the RFA     TECHNIQUE: Laying in a prone position, the patient was prepped and draped in the usual sterile fashion using ChloraPrep and  fenestrated drape. The level was determined under fluoroscopic guidance. Local anesthetic was given by going down to the hub of the 27-gauge 1.25in needle and raising a wheel. A 18-gauge 10mm curved active tip needle was introduced to the anatomic local of the medial branch at each of the stated above levels using fluoroscopy. Then sensory and motor testing was performed to confirm that the needle tips were in the correct location. Then after negative aspiration, 1 mL of 0.25% bupivacaine was injected into each level. This was followed by thermal lesioning at 80 degrees celsius for 90 seconds.       The patient tolerated the procedure well. Did not have any procedure related motor deficit at the conclusion of the procedure    The patient was monitored after the procedure. Patient was given post procedure and discharge instructions to follow at home. We will see the patient back in two weeks or the patient may call to inform of status. The patient was discharged in a stable condition

## 2024-06-18 NOTE — H&P
HPI  Patient presenting for Procedure(s) (LRB):  Bilateral C5-7 RFA with RN IV sedation (Bilateral)       No health changes since previous encounter    Past Medical History:   Diagnosis Date    Back pain     Neck pain     Scoliosis      Past Surgical History:   Procedure Laterality Date    INJECTION OF ANESTHETIC AGENT AROUND MEDIAL BRANCH NERVES INNERVATING CERVICAL FACET JOINT Left 5/19/2022    Procedure: bilateral T3-5 diagnostic MBB RN IV Sedation;  Surgeon: Roland Glass MD;  Location: [facility];  Service: Pain Management;  Laterality: Left;    INJECTION OF ANESTHETIC AGENT AROUND MEDIAL BRANCH NERVES INNERVATING CERVICAL FACET JOINT Bilateral 6/8/2023    Procedure: Bilateral C5-7 MBB 1st diagnostic with RN IV sedation;  Surgeon: Roland Glass MD;  Location: [facility];  Service: Pain Management;  Laterality: Bilateral;    INJECTION OF ANESTHETIC AGENT AROUND MEDIAL BRANCH NERVES INNERVATING CERVICAL FACET JOINT Bilateral 6/22/2023    Procedure: Bilateral C5-7 MBB (#2 diagnostic);  Surgeon: Roland Glass MD;  Location: [facility];  Service: Pain Management;  Laterality: Bilateral;    RADIOFREQUENCY THERMOCOAGULATION Left 7/25/2023    Procedure: Left C5-7 RFA with RN IV sedation;  Surgeon: Roland Glass MD;  Location: [facility];  Service: Pain Management;  Laterality: Left;    RADIOFREQUENCY THERMOCOAGULATION Right 8/22/2023    Procedure: Right C5-7 RFA with RN IV sedation;  Surgeon: Roland Glass MD;  Location: [facility];  Service: Pain Management;  Laterality: Right;     Review of patient's allergies indicates:   Allergen Reactions    Penicillins         Current Facility-Administered Medications on File Prior to Encounter   Medication Dose Route Frequency Provider Last Rate Last Admin    [DISCONTINUED] ondansetron injection 4 mg  4 mg Intravenous Once PRN Roland Glass MD         Current Outpatient Medications on File Prior to Encounter   Medication Sig Dispense  "Refill    albuterol (PROVENTIL/VENTOLIN HFA) 90 mcg/actuation inhaler Inhale 1-2 puffs into the lungs every 6 (six) hours as needed for Wheezing. 8 g 0    ALPRAZolam (XANAX) 1 MG tablet Take 1 tablet (1 mg total) by mouth once as needed for Anxiety (for MRI). 1 tablet 0    cyclobenzaprine (FLEXERIL) 10 MG tablet Take 1 tablet (10 mg total) by mouth 3 (three) times daily as needed for Muscle spasms. 90 tablet 2    FLUoxetine 20 MG capsule Take 20 mg by mouth.      hydrOXYzine pamoate (VISTARIL) 25 MG Cap Take 25-50 mg by mouth daily as needed.      ibuprofen (ADVIL,MOTRIN) 800 MG tablet Take 1 tablet (800 mg total) by mouth every 6 (six) hours as needed for Pain. 28 tablet 0    medroxyPROGESTERone (DEPO-PROVERA) 150 mg/mL injection Inject 150 mg into the muscle every 3 (three) months.      methocarbamoL (ROBAXIN) 500 MG Tab Take 1 tablet (500 mg total) by mouth 3 (three) times daily as needed (muscle spasms). 90 tablet 2    nortriptyline (PAMELOR) 50 MG capsule Take 1 capsule (50 mg total) by mouth nightly as needed (pain/insomnia). 30 capsule 2    zolpidem (AMBIEN) 5 MG Tab           PMHx, PSHx, Allergies, Medications reviewed in epic    ROS negative except pain complaints in HPI    OBJECTIVE:    /82 (BP Location: Right arm, Patient Position: Sitting)   Pulse (!) 115   Temp 97 °F (36.1 °C) (Temporal)   Resp 16   Ht 5' 5" (1.651 m)   Wt 51.6 kg (113 lb 12.1 oz)   SpO2 97%   Breastfeeding No   BMI 18.93 kg/m²     PHYSICAL EXAMINATION:    GENERAL: Well appearing, in no acute distress, alert and oriented x3.  PSYCH:  Mood and affect appropriate.  SKIN: Skin color, texture, turgor normal, no rashes or lesions which will impact the procedure.  CV: RRR with palpation of the radial artery.  PULM: No evidence of respiratory difficulty, symmetric chest rise. Clear to auscultation.  NEURO: Cranial nerves grossly intact.    Plan:    Proceed with procedure as planned Procedure(s) (LRB):  Bilateral C5-7 RFA with RN " IV sedation (Bilateral)    Roland Glass MD  06/18/2024

## 2024-06-18 NOTE — DISCHARGE INSTRUCTIONS

## 2024-06-18 NOTE — DISCHARGE SUMMARY
Discharge Note  Short Stay      SUMMARY     Admit Date: 6/18/2024    Attending Physician: Roland Glass MD        Discharge Physician: Roland Glass MD        Discharge Date: 6/18/2024 8:25 AM    Procedure(s) (LRB):  right C5-7 RFA with RN IV sedation (Right)    Final Diagnosis: Cervical spondylosis [M47.812]    Disposition: Home or self care    Patient Instructions:   Current Discharge Medication List        CONTINUE these medications which have NOT CHANGED    Details   albuterol (PROVENTIL/VENTOLIN HFA) 90 mcg/actuation inhaler Inhale 1-2 puffs into the lungs every 6 (six) hours as needed for Wheezing.  Qty: 8 g, Refills: 0      ALPRAZolam (XANAX) 1 MG tablet Take 1 tablet (1 mg total) by mouth once as needed for Anxiety (for MRI).  Qty: 1 tablet, Refills: 0    Associated Diagnoses: Cervicalgia      cyclobenzaprine (FLEXERIL) 10 MG tablet Take 1 tablet (10 mg total) by mouth 3 (three) times daily as needed for Muscle spasms.  Qty: 90 tablet, Refills: 2    Associated Diagnoses: Fibromyalgia; Cervicogenic headache; Myalgia of auxiliary muscles, head and neck; Chronic pain syndrome; Cervical spondylosis; Anxiety; Chronic pain disorder      FLUoxetine 20 MG capsule Take 20 mg by mouth.      hydrOXYzine pamoate (VISTARIL) 25 MG Cap Take 25-50 mg by mouth daily as needed.      ibuprofen (ADVIL,MOTRIN) 800 MG tablet Take 1 tablet (800 mg total) by mouth every 6 (six) hours as needed for Pain.  Qty: 28 tablet, Refills: 0      medroxyPROGESTERone (DEPO-PROVERA) 150 mg/mL injection Inject 150 mg into the muscle every 3 (three) months.      methocarbamoL (ROBAXIN) 500 MG Tab Take 1 tablet (500 mg total) by mouth 3 (three) times daily as needed (muscle spasms).  Qty: 90 tablet, Refills: 2    Associated Diagnoses: Fibromyalgia; Cervicogenic headache; Myalgia of auxiliary muscles, head and neck; Chronic pain syndrome; Cervical spondylosis; Anxiety; Chronic pain disorder      nortriptyline (PAMELOR) 50 MG capsule Take 1  capsule (50 mg total) by mouth nightly as needed (pain/insomnia).  Qty: 30 capsule, Refills: 2    Associated Diagnoses: Fibromyalgia; Cervicogenic headache; Myalgia of auxiliary muscles, head and neck; Chronic pain syndrome; Cervical spondylosis; Anxiety; Chronic pain disorder      zolpidem (AMBIEN) 5 MG Tab                  Discharge Diagnosis: Cervical spondylosis [M47.812]  Condition on Discharge: Stable with no complications to procedure   Diet on Discharge: Same as before.  Activity: as per instruction sheet.  Discharge to: Home with a responsible adult.  Follow up: 2-4 weeks       Please call the office at [phone] if you experience any weakness or loss of sensation, fever > 101.5, pain uncontrolled with oral medications, persistent nausea/vomiting/or diarrhea, redness or drainage from the incisions, or any other worrisome concerns. If physician on call was not reached or could not communicate with our office for any reason please go to the nearest emergency department

## 2024-06-18 NOTE — TELEPHONE ENCOUNTER
----- Message from Padma King sent at 6/18/2024  4:07 PM CDT -----  Regarding: missed call  Type:  Patient Returning Call    Who Called:Marilee  Who Left Message for Patient:Eliazar  Does the patient know what this is regarding?:no  Would the patient rather a call back or a response via [facility]? Call back  Best Call Back Number: [phone]  Additional Information:

## 2024-07-11 ENCOUNTER — PATIENT MESSAGE (OUTPATIENT)
Dept: PAIN MEDICINE | Facility: CLINIC | Age: 24
End: 2024-07-11
Payer: MEDICAID

## 2024-07-16 RX ORDER — CYCLOBENZAPRINE HCL 10 MG
10 TABLET ORAL 3 TIMES DAILY PRN
Qty: 90 TABLET | Refills: 2 | Status: CANCELLED | OUTPATIENT
Start: 2024-07-16 | End: 2024-10-14

## 2024-07-16 RX ORDER — NORTRIPTYLINE HYDROCHLORIDE 50 MG/1
50 CAPSULE ORAL NIGHTLY PRN
Qty: 30 CAPSULE | Refills: 2 | Status: CANCELLED | OUTPATIENT
Start: 2024-07-16 | End: 2025-07-16

## 2024-07-16 RX ORDER — METHOCARBAMOL 500 MG/1
500 TABLET, FILM COATED ORAL 3 TIMES DAILY PRN
Qty: 90 TABLET | Refills: 2 | Status: CANCELLED | OUTPATIENT
Start: 2024-07-16
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
## 2024-07-17 ENCOUNTER — OFFICE VISIT (OUTPATIENT)
Dept: PAIN MEDICINE | Facility: CLINIC | Age: 24
End: 2024-07-17
Payer: MEDICAID

## 2024-07-17 ENCOUNTER — PATIENT MESSAGE (OUTPATIENT)
Dept: PAIN MEDICINE | Facility: CLINIC | Age: 24
End: 2024-07-17

## 2024-07-17 VITALS — RESPIRATION RATE: 16 BRPM

## 2024-07-17 DIAGNOSIS — M79.12 MYALGIA OF AUXILIARY MUSCLES, HEAD AND NECK: ICD-10-CM

## 2024-07-17 DIAGNOSIS — M47.812 CERVICAL SPONDYLOSIS: Primary | ICD-10-CM

## 2024-07-17 DIAGNOSIS — M43.06 SPONDYLOLYSIS OF LUMBAR REGION: ICD-10-CM

## 2024-07-17 DIAGNOSIS — G44.86 CERVICOGENIC HEADACHE: ICD-10-CM

## 2024-07-17 DIAGNOSIS — M79.7 FIBROMYALGIA: ICD-10-CM

## 2024-07-17 DIAGNOSIS — M54.2 NECK PAIN: ICD-10-CM

## 2024-07-17 DIAGNOSIS — G89.4 CHRONIC PAIN SYNDROME: ICD-10-CM

## 2024-07-17 DIAGNOSIS — G89.4 CHRONIC PAIN DISORDER: ICD-10-CM

## 2024-07-17 DIAGNOSIS — F41.9 ANXIETY: ICD-10-CM

## 2024-07-17 DIAGNOSIS — M47.814 THORACIC SPONDYLOSIS: ICD-10-CM

## 2024-07-17 PROCEDURE — G2211 COMPLEX E/M VISIT ADD ON: HCPCS | Mod: 95,,, | Performed by: PHYSICIAN ASSISTANT

## 2024-07-17 PROCEDURE — 1160F RVW MEDS BY RX/DR IN RCRD: CPT | Mod: CPTII,95,, | Performed by: PHYSICIAN ASSISTANT

## 2024-07-17 PROCEDURE — 1159F MED LIST DOCD IN RCRD: CPT | Mod: CPTII,95,, | Performed by: PHYSICIAN ASSISTANT

## 2024-07-17 PROCEDURE — 99214 OFFICE O/P EST MOD 30 MIN: CPT | Mod: 95,,, | Performed by: PHYSICIAN ASSISTANT

## 2024-07-17 RX ORDER — METHYLPREDNISOLONE 4 MG/1
TABLET ORAL
Qty: 21 EACH | Refills: 0 | Status: SHIPPED | OUTPATIENT
Start: 2024-07-17

## 2024-08-01 ENCOUNTER — PATIENT MESSAGE (OUTPATIENT)
Dept: PAIN MEDICINE | Facility: CLINIC | Age: 24
End: 2024-08-01
Payer: MEDICAID

## 2024-08-01 DIAGNOSIS — M47.812 CERVICAL SPONDYLOSIS: Primary | ICD-10-CM
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
## 2024-08-02 ENCOUNTER — OFFICE VISIT (OUTPATIENT)
Dept: PAIN MEDICINE | Facility: CLINIC | Age: 24
End: 2024-08-02
Payer: MEDICAID

## 2024-08-02 VITALS — HEIGHT: 65 IN | BODY MASS INDEX: 18.93 KG/M2

## 2024-08-02 DIAGNOSIS — G89.4 CHRONIC PAIN SYNDROME: ICD-10-CM

## 2024-08-02 DIAGNOSIS — M54.2 NECK PAIN ON LEFT SIDE: Primary | ICD-10-CM

## 2024-08-02 DIAGNOSIS — G44.86 CERVICOGENIC HEADACHE: ICD-10-CM

## 2024-08-02 DIAGNOSIS — M47.812 CERVICAL SPONDYLOSIS: ICD-10-CM

## 2024-08-02 DIAGNOSIS — M79.12 MYALGIA OF AUXILIARY MUSCLES, HEAD AND NECK: ICD-10-CM

## 2024-08-06 ENCOUNTER — TELEPHONE (OUTPATIENT)
Dept: PAIN MEDICINE | Facility: CLINIC | Age: 24
End: 2024-08-06
Payer: MEDICAID

## 2024-08-15 ENCOUNTER — PATIENT MESSAGE (OUTPATIENT)
Dept: PAIN MEDICINE | Facility: HOSPITAL | Age: 24
End: 2024-08-15
Payer: MEDICAID

## 2024-08-15 NOTE — PRE-PROCEDURE INSTRUCTIONS
Spoke with patient regarding procedure scheduled on 8.22    Arrival time 0845     Has patient been sick with fever or on antibiotics within the last 7 days? No     Does the patient have any open wounds, sores or rashes? No     Does the patient have any recent fractures? no     Has patient received a vaccination within the last 7 days? No     Received the COVID vaccination? yes     Has the patient stopped all medications as directed? na     Does patient have a pacemaker, defibrillator, or implantable stimulator? No     Does the patient have a ride to and from procedure and someone reliable to remain with patient?  jossie     Is the patient diabetic? no     Does the patient have sleep apnea? Or use O2 at home? no     Is the patient receiving sedation? Yes      Is the patient instructed to remain NPO beginning at midnight the night before their procedure? yes     Procedure location confirmed with patient? Yes     Covid- Denies signs/symptoms. Instructed to notify PAT/MD if any changes.

## 2024-08-21 ENCOUNTER — TELEPHONE (OUTPATIENT)
Dept: PAIN MEDICINE | Facility: CLINIC | Age: 24
End: 2024-08-21
Payer: MEDICAID

## 2024-08-21 NOTE — TELEPHONE ENCOUNTER
Called and spoke with pt to inform per Dr Glass IV sedation will be given on 8/22/22 for Cervical MBB.    .Margot Cespedes CCMA

## 2024-08-21 NOTE — TELEPHONE ENCOUNTER
----- Message from Gely Sanford sent at 8/21/2024 10:08 AM CDT -----  Contact: Columba Kumar is calling to receive a call back at .[phone]. Reports wanting to know if she can have a script of something to calm her before tomorrow's procedure.

## 2024-08-22 ENCOUNTER — TELEPHONE (OUTPATIENT)
Dept: PAIN MEDICINE | Facility: CLINIC | Age: 24
End: 2024-08-22
Payer: MEDICAID

## 2024-08-22 ENCOUNTER — HOSPITAL ENCOUNTER (OUTPATIENT)
Facility: HOSPITAL | Age: 24
Discharge: HOME | End: 2024-08-22
Attending: PHYSICAL MEDICINE & REHABILITATION | Admitting: PHYSICAL MEDICINE & REHABILITATION
Payer: MEDICAID

## 2024-08-22 VITALS
DIASTOLIC BLOOD PRESSURE: 85 MMHG | TEMPERATURE: 98 F | OXYGEN SATURATION: 100 % | HEART RATE: 91 BPM | WEIGHT: 112.44 LBS | SYSTOLIC BLOOD PRESSURE: 123 MMHG | RESPIRATION RATE: 14 BRPM | HEIGHT: 65 IN | BODY MASS INDEX: 18.73 KG/M2

## 2024-08-22 DIAGNOSIS — M47.812 CERVICAL SPONDYLOSIS: Primary | ICD-10-CM

## 2024-08-22 LAB
B-HCG UR QL: NEGATIVE
CTP QC/QA: YES

## 2024-08-22 PROCEDURE — 64493 INJ PARAVERT F JNT L/S 1 LEV: CPT | Mod: LT | Performed by: PHYSICAL MEDICINE & REHABILITATION

## 2024-08-22 PROCEDURE — 81025 URINE PREGNANCY TEST: CPT | Performed by: PHYSICAL MEDICINE & REHABILITATION

## 2024-08-22 PROCEDURE — 64493 INJ PARAVERT F JNT L/S 1 LEV: CPT | Mod: LT,,, | Performed by: PHYSICAL MEDICINE & REHABILITATION

## 2024-08-22 PROCEDURE — 63600175 PHARM REV CODE 636 W HCPCS: Performed by: PHYSICAL MEDICINE & REHABILITATION

## 2024-08-22 PROCEDURE — 64494 INJ PARAVERT F JNT L/S 2 LEV: CPT | Mod: LT | Performed by: PHYSICAL MEDICINE & REHABILITATION

## 2024-08-22 PROCEDURE — 64494 INJ PARAVERT F JNT L/S 2 LEV: CPT | Mod: LT,,, | Performed by: PHYSICAL MEDICINE & REHABILITATION

## 2024-08-22 RX ORDER — ONDANSETRON HYDROCHLORIDE 2 MG/ML
4 INJECTION, SOLUTION INTRAVENOUS ONCE AS NEEDED
Status: DISCONTINUED | OUTPATIENT
Start: 2024-08-22 | End: 2024-08-22 | Stop reason: HOSPADM

## 2024-08-22 RX ORDER — FENTANYL CITRATE 50 UG/ML
INJECTION, SOLUTION INTRAMUSCULAR; INTRAVENOUS
Status: DISCONTINUED | OUTPATIENT
Start: 2024-08-22 | End: 2024-08-22 | Stop reason: HOSPADM

## 2024-08-22 RX ORDER — MIDAZOLAM HYDROCHLORIDE 1 MG/ML
INJECTION, SOLUTION INTRAMUSCULAR; INTRAVENOUS
Status: DISCONTINUED | OUTPATIENT
Start: 2024-08-22 | End: 2024-08-22 | Stop reason: HOSPADM

## 2024-08-22 RX ORDER — BUPIVACAINE HYDROCHLORIDE 5 MG/ML
INJECTION, SOLUTION EPIDURAL; INTRACAUDAL
Status: DISCONTINUED | OUTPATIENT
Start: 2024-08-22 | End: 2024-08-22 | Stop reason: HOSPADM

## 2024-08-22 NOTE — DISCHARGE SUMMARY
Discharge Note  Short Stay      SUMMARY     Admit Date: 8/22/2024    Attending Physician: Roland Glass MD        Discharge Physician: Roland Glass MD        Discharge Date: 8/22/2024 10:48 AM    Procedure(s) (LRB):  Diagnostic Left C3-5 MBB #1 (Left)    Final Diagnosis: Cervical spondylosis [M47.812]    Disposition: Home or self care    Patient Instructions:   Current Discharge Medication List        CONTINUE these medications which have NOT CHANGED    Details   cyclobenzaprine (FLEXERIL) 10 MG tablet Take 1 tablet (10 mg total) by mouth 3 (three) times daily as needed for Muscle spasms.  Qty: 90 tablet, Refills: 2    Associated Diagnoses: Fibromyalgia; Cervicogenic headache; Myalgia of auxiliary muscles, head and neck; Chronic pain syndrome; Cervical spondylosis; Anxiety; Chronic pain disorder      zolpidem (AMBIEN) 5 MG Tab       albuterol (PROVENTIL/VENTOLIN HFA) 90 mcg/actuation inhaler Inhale 1-2 puffs into the lungs every 6 (six) hours as needed for Wheezing.  Qty: 8 g, Refills: 0      ALPRAZolam (XANAX) 1 MG tablet Take 1 tablet (1 mg total) by mouth once as needed for Anxiety (for MRI).  Qty: 1 tablet, Refills: 0    Associated Diagnoses: Cervicalgia      ibuprofen (ADVIL,MOTRIN) 800 MG tablet Take 1 tablet (800 mg total) by mouth every 6 (six) hours as needed for Pain.  Qty: 28 tablet, Refills: 0      medroxyPROGESTERone (DEPO-PROVERA) 150 mg/mL injection Inject 150 mg into the muscle every 3 (three) months.      methylPREDNISolone (MEDROL DOSEPACK) 4 mg tablet use as directed  Qty: 21 each, Refills: 0    Associated Diagnoses: Neck pain           STOP taking these medications       methocarbamoL (ROBAXIN) 500 MG Tab Comments:   Reason for Stopping:                   Discharge Diagnosis: Cervical spondylosis [M47.812]  Condition on Discharge: Stable with no complications to procedure   Diet on Discharge: Same as before.  Activity: as per instruction sheet.  Discharge to: Home with a responsible  adult.  Follow up: 2-4 weeks       Please call the office at [phone] if you experience any weakness or loss of sensation, fever > 101.5, pain uncontrolled with oral medications, persistent nausea/vomiting/or diarrhea, redness or drainage from the incisions, or any other worrisome concerns. If physician on call was not reached or could not communicate with our office for any reason please go to the nearest emergency department

## 2024-08-22 NOTE — DISCHARGE INSTRUCTIONS

## 2024-08-22 NOTE — TELEPHONE ENCOUNTER
Spoke with patient she is 7 minutes out and will be here shortly.  [place]  Informed Natalya also.

## 2024-08-22 NOTE — TELEPHONE ENCOUNTER
----- Message from Kim Shafer sent at 8/22/2024  8:47 AM CDT -----  Contact: Columba  Patient called in regards to she has a procedure scheduled for this morning and  is stuck in traffic and will be about 15 mins late. Call back is [phone]

## 2024-08-22 NOTE — OP NOTE
CERVICAL Medial Branch Block (DIAGNOSTIC) Under Fluoroscopy  Time-out taken to identify patient and procedure side prior to starting the procedure.        Date of Procedure: 08/22/2024                                                             Patient has clinical and imaging findings suggestive of facet mediated pain.    For supporting clinical documentation, please see most recent clinic and telephone notes.      PROCEDURE:  Left  medial branch block at the transverse processes of C2, C3, C4    REASON FOR PROCEDURE:  Cervical spondylosis [M47.812]    PHYSICIAN: Roland Glass MD    ASSISTANTS: None    MEDICATIONS INJECTED:  0.5% Bupivicaine total 5mL  LOCAL ANESTHETIC USED:   Xylocaine 1% 1mL / site    SEDATION MEDICATIONS: Conscious sedation provided by M.D    The patient was monitored with continuous pulse oximetry, EKG, and intermittent blood pressure monitors, immediately prior to administration of sedation.  The patient was hemodynamically stable throughout the entire process was responsive to voice, and breathing spontaneously.  Supplemental O2 was provided at 2L/min via nasal cannula.  Patient was comfortable for the duration of the procedure.     There was a total of 2mg IV Midazolam and 25mcg Fentanyl titrated for the procedure    Total sedation time was >10minutes and <20minutes      ESTIMATED BLOOD LOSS:  None.    COMPLICATIONS:  None.    TECHNIQUE:   Laying in a prone position, the patient was prepped and draped in the usual sterile fashion using ChloraPrep and fenestrated drape.  The level was determined under fluoroscopic guidance.  Local anesthetic was given by going down to the hub of the 27-gauge 1.25in needle and raising a wheel.  A 22-gauge 3.5inch needle was introduced to the anatomic local of the medial branch at each of the stated above levels using fluoroscopy. Then after negative aspiration, the medication was injected slowly. The patient tolerated the procedure well.       The patient  was monitored after the procedure.  Patient was given post procedure and discharge instructions to follow at home.  We will see the patient back in two weeks or the patient may call to inform of status. The patient was discharged in a stable condition

## 2024-08-22 NOTE — H&P
HPI  Patient presenting for Procedure(s) (LRB):  Diagnostic Left C3-5 MBB #1 (Left)       No health changes since previous encounter    Past Medical History:   Diagnosis Date    Back pain     Neck pain     Scoliosis      Past Surgical History:   Procedure Laterality Date    INJECTION OF ANESTHETIC AGENT AROUND MEDIAL BRANCH NERVES INNERVATING CERVICAL FACET JOINT Left 5/19/2022    Procedure: bilateral T3-5 diagnostic MBB RN IV Sedation;  Surgeon: Roland Glass MD;  Location: [facility];  Service: Pain Management;  Laterality: Left;    INJECTION OF ANESTHETIC AGENT AROUND MEDIAL BRANCH NERVES INNERVATING CERVICAL FACET JOINT Bilateral 6/8/2023    Procedure: Bilateral C5-7 MBB 1st diagnostic with RN IV sedation;  Surgeon: Roland Glass MD;  Location: [facility];  Service: Pain Management;  Laterality: Bilateral;    INJECTION OF ANESTHETIC AGENT AROUND MEDIAL BRANCH NERVES INNERVATING CERVICAL FACET JOINT Bilateral 6/22/2023    Procedure: Bilateral C5-7 MBB (#2 diagnostic);  Surgeon: Roland Glass MD;  Location: [facility];  Service: Pain Management;  Laterality: Bilateral;    RADIOFREQUENCY THERMOCOAGULATION Left 7/25/2023    Procedure: Left C5-7 RFA with RN IV sedation;  Surgeon: Roland Glass MD;  Location: [facility];  Service: Pain Management;  Laterality: Left;    RADIOFREQUENCY THERMOCOAGULATION Right 8/22/2023    Procedure: Right C5-7 RFA with RN IV sedation;  Surgeon: Roland Glass MD;  Location: [facility];  Service: Pain Management;  Laterality: Right;    RADIOFREQUENCY THERMOCOAGULATION Right 6/18/2024    Procedure: right C5-7 RFA with RN IV sedation;  Surgeon: Roland Glass MD;  Location: [facility];  Service: Pain Management;  Laterality: Right;     Review of patient's allergies indicates:   Allergen Reactions    Penicillins         No current facility-administered medications on file prior to encounter.     Current Outpatient Medications on File Prior to  "Encounter   Medication Sig Dispense Refill    cyclobenzaprine (FLEXERIL) 10 MG tablet Take 1 tablet (10 mg total) by mouth 3 (three) times daily as needed for Muscle spasms. 90 tablet 2    zolpidem (AMBIEN) 5 MG Tab       albuterol (PROVENTIL/VENTOLIN HFA) 90 mcg/actuation inhaler Inhale 1-2 puffs into the lungs every 6 (six) hours as needed for Wheezing. 8 g 0    ALPRAZolam (XANAX) 1 MG tablet Take 1 tablet (1 mg total) by mouth once as needed for Anxiety (for MRI). 1 tablet 0    ibuprofen (ADVIL,MOTRIN) 800 MG tablet Take 1 tablet (800 mg total) by mouth every 6 (six) hours as needed for Pain. 28 tablet 0    medroxyPROGESTERone (DEPO-PROVERA) 150 mg/mL injection Inject 150 mg into the muscle every 3 (three) months.      methocarbamoL (ROBAXIN) 500 MG Tab Take 1 tablet (500 mg total) by mouth 3 (three) times daily as needed (muscle spasms). 90 tablet 2    methylPREDNISolone (MEDROL DOSEPACK) 4 mg tablet use as directed 21 each 0    [DISCONTINUED] FLUoxetine 20 MG capsule Take 20 mg by mouth.      [DISCONTINUED] hydrOXYzine pamoate (VISTARIL) 25 MG Cap Take 25-50 mg by mouth daily as needed.      [DISCONTINUED] nortriptyline (PAMELOR) 50 MG capsule Take 1 capsule (50 mg total) by mouth nightly as needed (pain/insomnia). 30 capsule 2        PMHx, PSHx, Allergies, Medications reviewed in epic    ROS negative except pain complaints in HPI    OBJECTIVE:    /79 (BP Location: Right arm, Patient Position: Sitting)   Pulse (!) 117   Temp 97.6 °F (36.4 °C)   Resp 17   Ht 5' 5" (1.651 m)   Wt 51 kg (112 lb 7 oz)   SpO2 100%   Breastfeeding No   BMI 18.71 kg/m²     PHYSICAL EXAMINATION:    GENERAL: Well appearing, in no acute distress, alert and oriented x3.  PSYCH:  Mood and affect appropriate.  SKIN: Skin color, texture, turgor normal, no rashes or lesions which will impact the procedure.  CV: RRR with palpation of the radial artery.  PULM: No evidence of respiratory difficulty, symmetric chest rise. Clear to " auscultation.  NEURO: Cranial nerves grossly intact.    Plan:    Proceed with procedure as planned Procedure(s) (LRB):  Diagnostic Left C3-5 MBB #1 (Left)    Roland Glass MD  08/22/2024

## 2024-08-23 ENCOUNTER — TELEPHONE (OUTPATIENT)
Dept: PAIN MEDICINE | Facility: CLINIC | Age: 24
End: 2024-08-23
Payer: MEDICAID

## 2024-08-23 ENCOUNTER — PATIENT MESSAGE (OUTPATIENT)
Dept: PAIN MEDICINE | Facility: CLINIC | Age: 24
End: 2024-08-23
Payer: MEDICAID

## 2024-08-23 NOTE — TELEPHONE ENCOUNTER
Called and spoke pt regarding Cervical MBB #1 done by Dr Glass on 8/22/24. Pt reports the following the information:    1. What percentage of pain relief did you receive following the block, from 0-100%? Pt reports receiving 80% pain relief.    2. What was pain score the day before your procedure on a scale from 0-10? Pt current pain level was 4/10    3. What was pain score immediately after your procedure (up to 6 hours)  on a scale from 0-10? 3/10    4. When your pain returned, what was your pain score on a scale from 0-10? 3/10     5. How many hours did pain relief last following the block?  10-12 hours     6. During this time, please describe in detail the activities you were able to do? Pt reports taking it easy and resting not being to active.    Pt has been scheduled for #2 Cervical MBB with Dr Glass on 9/12/24    .Margot [email or web address]          Pain Disability Index (PDI) Score Review:      5/6/2022    12:38 PM   Last 3 PDI Scores   Pain Disability Index (PDI) 64

## 2024-08-26 RX ORDER — TRAMADOL HYDROCHLORIDE 50 MG/1
50 TABLET ORAL EVERY 8 HOURS PRN
Qty: 15 TABLET | Refills: 0 | Status: SHIPPED | OUTPATIENT
Start: 2024-08-26 | End: 2024-08-31

## 2024-09-02 ENCOUNTER — PATIENT MESSAGE (OUTPATIENT)
Dept: PAIN MEDICINE | Facility: CLINIC | Age: 24
End: 2024-09-02
Payer: MEDICAID

## 2024-09-04 ENCOUNTER — PATIENT MESSAGE (OUTPATIENT)
Dept: PAIN MEDICINE | Facility: HOSPITAL | Age: 24
End: 2024-09-04
Payer: MEDICAID

## 2024-09-04 RX ORDER — DIAZEPAM 5 MG/1
5 TABLET ORAL ONCE
Qty: 1 TABLET | Refills: 0 | Status: SHIPPED | OUTPATIENT
Start: 2024-09-04 | End: 2024-09-04

## 2024-09-04 NOTE — PRE-PROCEDURE INSTRUCTIONS
Spoke with patient regarding procedure scheduled on 9.12    Arrival time 0915     Has patient been sick with fever or on antibiotics within the last 7 days? No     Does the patient have any open wounds, sores or rashes? No     Does the patient have any recent fractures? no     Has patient received a vaccination within the last 7 days? No     Received the COVID vaccination? yes     Has the patient stopped all medications as directed? na     Does patient have a pacemaker, defibrillator, or implantable stimulator? No     Does the patient have a ride to and from procedure and someone reliable to remain with patient?       Is the patient diabetic? no     Does the patient have sleep apnea? Or use O2 at home? no     Is the patient receiving sedation? Yes      Is the patient instructed to remain NPO beginning at midnight the night before their procedure? yes     Procedure location confirmed with patient? Yes     Covid- Denies signs/symptoms. Instructed to notify PAT/MD if any changes.

## 2024-09-05 ENCOUNTER — TELEPHONE (OUTPATIENT)
Dept: PAIN MEDICINE | Facility: CLINIC | Age: 24
End: 2024-09-05
Payer: MEDICAID

## 2024-09-12 ENCOUNTER — HOSPITAL ENCOUNTER (OUTPATIENT)
Facility: HOSPITAL | Age: 24
Discharge: HOME | End: 2024-09-12
Attending: PHYSICAL MEDICINE & REHABILITATION | Admitting: PHYSICAL MEDICINE & REHABILITATION
Payer: MEDICAID

## 2024-09-12 VITALS
BODY MASS INDEX: 19.21 KG/M2 | HEART RATE: 97 BPM | TEMPERATURE: 97 F | RESPIRATION RATE: 15 BRPM | HEIGHT: 65 IN | OXYGEN SATURATION: 100 % | SYSTOLIC BLOOD PRESSURE: 117 MMHG | DIASTOLIC BLOOD PRESSURE: 82 MMHG | WEIGHT: 115.31 LBS

## 2024-09-12 DIAGNOSIS — M47.812 CERVICAL SPONDYLOSIS: Primary | ICD-10-CM

## 2024-09-12 LAB
B-HCG UR QL: NEGATIVE
CTP QC/QA: YES

## 2024-09-12 PROCEDURE — 64490 INJ PARAVERT F JNT C/T 1 LEV: CPT | Mod: LT | Performed by: PHYSICAL MEDICINE & REHABILITATION

## 2024-09-12 PROCEDURE — 64490 INJ PARAVERT F JNT C/T 1 LEV: CPT | Mod: LT,,, | Performed by: PHYSICAL MEDICINE & REHABILITATION

## 2024-09-12 PROCEDURE — 64491 INJ PARAVERT F JNT C/T 2 LEV: CPT | Mod: LT,,, | Performed by: PHYSICAL MEDICINE & REHABILITATION

## 2024-09-12 PROCEDURE — 63600175 PHARM REV CODE 636 W HCPCS: Performed by: PHYSICAL MEDICINE & REHABILITATION

## 2024-09-12 PROCEDURE — 81025 URINE PREGNANCY TEST: CPT | Performed by: PHYSICAL MEDICINE & REHABILITATION

## 2024-09-12 PROCEDURE — 64491 INJ PARAVERT F JNT C/T 2 LEV: CPT | Mod: LT | Performed by: PHYSICAL MEDICINE & REHABILITATION

## 2024-09-12 RX ORDER — BUPIVACAINE HYDROCHLORIDE 5 MG/ML
INJECTION, SOLUTION EPIDURAL; INTRACAUDAL
Status: DISCONTINUED | OUTPATIENT
Start: 2024-09-12 | End: 2024-09-12 | Stop reason: HOSPADM

## 2024-09-12 RX ORDER — ONDANSETRON HYDROCHLORIDE 2 MG/ML
4 INJECTION, SOLUTION INTRAVENOUS ONCE AS NEEDED
Status: DISCONTINUED | OUTPATIENT
Start: 2024-09-12 | End: 2024-09-12 | Stop reason: HOSPADM

## 2024-09-12 RX ORDER — DEXAMETHASONE SODIUM PHOSPHATE 10 MG/ML
INJECTION INTRAMUSCULAR; INTRAVENOUS
Status: DISCONTINUED | OUTPATIENT
Start: 2024-09-12 | End: 2024-09-12 | Stop reason: HOSPADM

## 2024-09-12 RX ORDER — FENTANYL CITRATE 50 UG/ML
INJECTION, SOLUTION INTRAMUSCULAR; INTRAVENOUS
Status: DISCONTINUED | OUTPATIENT
Start: 2024-09-12 | End: 2024-09-12 | Stop reason: HOSPADM

## 2024-09-12 RX ORDER — MIDAZOLAM HYDROCHLORIDE 1 MG/ML
INJECTION INTRAMUSCULAR; INTRAVENOUS
Status: DISCONTINUED | OUTPATIENT
Start: 2024-09-12 | End: 2024-09-12 | Stop reason: HOSPADM

## 2024-09-12 NOTE — DISCHARGE SUMMARY
Discharge Note  Short Stay      SUMMARY     Admit Date: 9/12/2024    Attending Physician: Roland Glass MD        Discharge Physician: Roland Glass MD        Discharge Date: 9/12/2024 10:34 AM    Procedure(s) (LRB):  Diagnostic Left C3-5 MBB #2 (Left)    Final Diagnosis: Cervical spondylosis [M47.812]    Disposition: Home or self care    Patient Instructions:   Current Discharge Medication List        CONTINUE these medications which have NOT CHANGED    Details   medroxyPROGESTERone (DEPO-PROVERA) 150 mg/mL injection Inject 150 mg into the muscle every 3 (three) months.      zolpidem (AMBIEN) 5 MG Tab       albuterol (PROVENTIL/VENTOLIN HFA) 90 mcg/actuation inhaler Inhale 1-2 puffs into the lungs every 6 (six) hours as needed for Wheezing.  Qty: 8 g, Refills: 0      ALPRAZolam (XANAX) 1 MG tablet Take 1 tablet (1 mg total) by mouth once as needed for Anxiety (for MRI).  Qty: 1 tablet, Refills: 0    Associated Diagnoses: Cervicalgia      diazePAM (VALIUM) 5 MG tablet Take 1 tablet (5 mg total) by mouth once. 30-45 minutes prior to procedure for anxiety for 1 dose  Qty: 1 tablet, Refills: 0      ibuprofen (ADVIL,MOTRIN) 800 MG tablet Take 1 tablet (800 mg total) by mouth every 6 (six) hours as needed for Pain.  Qty: 28 tablet, Refills: 0      methylPREDNISolone (MEDROL DOSEPACK) 4 mg tablet use as directed  Qty: 21 each, Refills: 0    Associated Diagnoses: Neck pain                 Discharge Diagnosis: Cervical spondylosis [M47.812]  Condition on Discharge: Stable with no complications to procedure   Diet on Discharge: Same as before.  Activity: as per instruction sheet.  Discharge to: Home with a responsible adult.  Follow up: 2-4 weeks       Please call the office at [phone] if you experience any weakness or loss of sensation, fever > 101.5, pain uncontrolled with oral medications, persistent nausea/vomiting/or diarrhea, redness or drainage from the incisions, or any other worrisome concerns. If  physician on call was not reached or could not communicate with our office for any reason please go to the nearest emergency department

## 2024-09-12 NOTE — DISCHARGE INSTRUCTIONS

## 2024-09-12 NOTE — OP NOTE
CERVICAL Medial Branch Block (DIAGNOSTIC) Under Fluoroscopy  Time-out taken to identify patient and procedure side prior to starting the procedure.        Date of Procedure: 09/12/2024                                                             Patient has clinical and imaging findings suggestive of facet mediated pain and has completed previous diagnostic medial branch blocks at specified levels with at least 80% relief for the expected duration of the local anesthetic utilized.    For supporting clinical documentation, please see most recent clinic and telephone notes.      PROCEDURE:  Left medial branch block at the transverse processes of C3, C4, C5    REASON FOR PROCEDURE:  Cervical spondylosis [M47.812]    PHYSICIAN: Roland Glass MD    ASSISTANTS: None    MEDICATIONS INJECTED:  0.5% Bupivicaine total 5mL  LOCAL ANESTHETIC USED:   Xylocaine 1% 1mL / site    SEDATION MEDICATIONS: Conscious sedation provided by M.D    The patient was monitored with continuous pulse oximetry, EKG, and intermittent blood pressure monitors, immediately prior to administration of sedation.  The patient was hemodynamically stable throughout the entire process was responsive to voice, and breathing spontaneously.  Supplemental O2 was provided at 2L/min via nasal cannula.  Patient was comfortable for the duration of the procedure.     There was a total of 2mg IV Midazolam and 75mcg Fentanyl titrated for the procedure    Total sedation time was >10minutes and <20minutes      ESTIMATED BLOOD LOSS:  None.    COMPLICATIONS:  None.    TECHNIQUE:   Laying in a prone position, the patient was prepped and draped in the usual sterile fashion using ChloraPrep and fenestrated drape.  The level was determined under fluoroscopic guidance.  Local anesthetic was given by going down to the hub of the 27-gauge 1.25in needle and raising a wheel.  A 22-gauge 3.5inch needle was introduced to the anatomic local of the medial branch at each of the  stated above levels using fluoroscopy. Then after negative aspiration, the medication was injected slowly. The patient tolerated the procedure well.       The patient was monitored after the procedure.  Patient was given post procedure and discharge instructions to follow at home.  We will see the patient back in two weeks or the patient may call to inform of status. The patient was discharged in a stable condition

## 2024-09-12 NOTE — H&P
HPI  Patient presenting for Procedure(s) (LRB):  Diagnostic Left C3-5 MBB #2 (Left)       No health changes since previous encounter    Past Medical History:   Diagnosis Date    Back pain     Neck pain     Scoliosis      Past Surgical History:   Procedure Laterality Date    INJECTION OF ANESTHETIC AGENT AROUND MEDIAL BRANCH NERVES INNERVATING CERVICAL FACET JOINT Left 5/19/2022    Procedure: bilateral T3-5 diagnostic MBB RN IV Sedation;  Surgeon: Roland Glass MD;  Location: [facility];  Service: Pain Management;  Laterality: Left;    INJECTION OF ANESTHETIC AGENT AROUND MEDIAL BRANCH NERVES INNERVATING CERVICAL FACET JOINT Bilateral 6/8/2023    Procedure: Bilateral C5-7 MBB 1st diagnostic with RN IV sedation;  Surgeon: Roland Glass MD;  Location: [facility];  Service: Pain Management;  Laterality: Bilateral;    INJECTION OF ANESTHETIC AGENT AROUND MEDIAL BRANCH NERVES INNERVATING CERVICAL FACET JOINT Bilateral 6/22/2023    Procedure: Bilateral C5-7 MBB (#2 diagnostic);  Surgeon: Roland Glass MD;  Location: [facility];  Service: Pain Management;  Laterality: Bilateral;    INJECTION OF ANESTHETIC AGENT AROUND MEDIAL BRANCH NERVES INNERVATING CERVICAL FACET JOINT Left 8/22/2024    Procedure: Diagnostic Left C3-5 MBB #1;  Surgeon: Roland Glass MD;  Location: [facility];  Service: Pain Management;  Laterality: Left;    RADIOFREQUENCY THERMOCOAGULATION Left 7/25/2023    Procedure: Left C5-7 RFA with RN IV sedation;  Surgeon: Roland Glass MD;  Location: [facility];  Service: Pain Management;  Laterality: Left;    RADIOFREQUENCY THERMOCOAGULATION Right 8/22/2023    Procedure: Right C5-7 RFA with RN IV sedation;  Surgeon: Roland Glass MD;  Location: [facility];  Service: Pain Management;  Laterality: Right;    RADIOFREQUENCY THERMOCOAGULATION Right 6/18/2024    Procedure: right C5-7 RFA with RN IV sedation;  Surgeon: Roland Glass MD;  Location: [facility];  Service:  "Pain Management;  Laterality: Right;     Review of patient's allergies indicates:   Allergen Reactions    Penicillins         No current facility-administered medications on file prior to encounter.     Current Outpatient Medications on File Prior to Encounter   Medication Sig Dispense Refill    medroxyPROGESTERone (DEPO-PROVERA) 150 mg/mL injection Inject 150 mg into the muscle every 3 (three) months.      zolpidem (AMBIEN) 5 MG Tab       albuterol (PROVENTIL/VENTOLIN HFA) 90 mcg/actuation inhaler Inhale 1-2 puffs into the lungs every 6 (six) hours as needed for Wheezing. 8 g 0    ALPRAZolam (XANAX) 1 MG tablet Take 1 tablet (1 mg total) by mouth once as needed for Anxiety (for MRI). 1 tablet 0    ibuprofen (ADVIL,MOTRIN) 800 MG tablet Take 1 tablet (800 mg total) by mouth every 6 (six) hours as needed for Pain. 28 tablet 0    methylPREDNISolone (MEDROL DOSEPACK) 4 mg tablet use as directed 21 each 0        PMHx, PSHx, Allergies, Medications reviewed in epic    ROS negative except pain complaints in HPI    OBJECTIVE:    BP (!) 131/92 (BP Location: Right arm, Patient Position: Sitting)   Pulse (!) 111   Temp 97.3 °F (36.3 °C) (Temporal)   Resp 16   Ht 5' 5" (1.651 m)   Wt 52.3 kg (115 lb 4.8 oz)   SpO2 100%   Breastfeeding No   BMI 19.19 kg/m²     PHYSICAL EXAMINATION:    GENERAL: Well appearing, in no acute distress, alert and oriented x3.  PSYCH:  Mood and affect appropriate.  SKIN: Skin color, texture, turgor normal, no rashes or lesions which will impact the procedure.  CV: RRR with palpation of the radial artery.  PULM: No evidence of respiratory difficulty, symmetric chest rise. Clear to auscultation.  NEURO: Cranial nerves grossly intact.    Plan:    Proceed with procedure as planned Procedure(s) (LRB):  Diagnostic Left C3-5 MBB #2 (Left)    Roland Glass MD  09/12/2024            "

## 2024-09-13 ENCOUNTER — TELEPHONE (OUTPATIENT)
Dept: PAIN MEDICINE | Facility: CLINIC | Age: 24
End: 2024-09-13
Payer: MEDICAID

## 2024-09-13 NOTE — TELEPHONE ENCOUNTER
RFA IS SCHEDULED, NO ORDERS NEEDED.    1. What percentage of pain relief did you receive following the block, from 0-100%?   85    2. What was pain score the day before your procedure on a scale from 0-10?  10    3. What was pain score immediately after your procedure (up to 6 hours)  on a scale from 0-10?  2    4. When your pain returned, what was your pain score on a scale from 0-10?  5       5. How many hours did pain relief last following the block?    Over 12 hours       6. During this time, please describe in detail the activities you were able to do?  House Chores        Pain Disability Index (PDI) Score Review:      5/6/2022    12:38 PM   Last 3 PDI Scores   Pain Disability Index (PDI) 64

## 2024-09-18 ENCOUNTER — PATIENT MESSAGE (OUTPATIENT)
Dept: PAIN MEDICINE | Facility: HOSPITAL | Age: 24
End: 2024-09-18
Payer: MEDICAID

## 2024-09-18 NOTE — PRE-PROCEDURE INSTRUCTIONS
Spoke with patient regarding procedure scheduled on 9.26    Arrival time 0800     Has patient been sick with fever or on antibiotics within the last 7 days? No     Does the patient have any open wounds, sores or rashes? No     Does the patient have any recent fractures? no     Has patient received a vaccination within the last 7 days? No     Received the COVID vaccination? yes     Has the patient stopped all medications as directed? na     Does patient have a pacemaker, defibrillator, or implantable stimulator? No     Does the patient have a ride to and from procedure and someone reliable to remain with patient?       Is the patient diabetic? no     Does the patient have sleep apnea? Or use O2 at home? no     Is the patient receiving sedation? Yes      Is the patient instructed to remain NPO beginning at midnight the night before their procedure? yes     Procedure location confirmed with patient? Yes     Covid- Denies signs/symptoms. Instructed to notify PAT/MD if any changes.

## 2024-09-25 RX ORDER — DIAZEPAM 5 MG/1
5 TABLET ORAL ONCE
Qty: 1 TABLET | Refills: 0 | Status: SHIPPED | OUTPATIENT
Start: 2024-09-25 | End: 2024-09-26

## 2024-09-26 ENCOUNTER — HOSPITAL ENCOUNTER (OUTPATIENT)
Facility: HOSPITAL | Age: 24
Discharge: HOME | End: 2024-09-26
Attending: PHYSICAL MEDICINE & REHABILITATION | Admitting: PHYSICAL MEDICINE & REHABILITATION
Payer: MEDICAID

## 2024-09-26 VITALS
HEART RATE: 99 BPM | RESPIRATION RATE: 16 BRPM | TEMPERATURE: 98 F | DIASTOLIC BLOOD PRESSURE: 84 MMHG | SYSTOLIC BLOOD PRESSURE: 119 MMHG | HEIGHT: 65 IN | WEIGHT: 117.38 LBS | OXYGEN SATURATION: 99 % | BODY MASS INDEX: 19.56 KG/M2

## 2024-09-26 DIAGNOSIS — M47.812 CERVICAL SPONDYLOSIS: Primary | ICD-10-CM

## 2024-09-26 LAB
B-HCG UR QL: NEGATIVE
CTP QC/QA: YES

## 2024-09-26 PROCEDURE — 99152 MOD SED SAME PHYS/QHP 5/>YRS: CPT | Performed by: PHYSICAL MEDICINE & REHABILITATION

## 2024-09-26 PROCEDURE — 63600175 PHARM REV CODE 636 W HCPCS: Performed by: PHYSICAL MEDICINE & REHABILITATION

## 2024-09-26 PROCEDURE — 64633 DESTROY CERV/THOR FACET JNT: CPT | Mod: LT | Performed by: PHYSICAL MEDICINE & REHABILITATION

## 2024-09-26 PROCEDURE — 64634 DESTROY C/TH FACET JNT ADDL: CPT | Mod: LT,,, | Performed by: PHYSICAL MEDICINE & REHABILITATION

## 2024-09-26 PROCEDURE — 81025 URINE PREGNANCY TEST: CPT | Performed by: PHYSICAL MEDICINE & REHABILITATION

## 2024-09-26 PROCEDURE — 64633 DESTROY CERV/THOR FACET JNT: CPT | Mod: LT,,, | Performed by: PHYSICAL MEDICINE & REHABILITATION

## 2024-09-26 PROCEDURE — 64634 DESTROY C/TH FACET JNT ADDL: CPT | Mod: LT | Performed by: PHYSICAL MEDICINE & REHABILITATION

## 2024-09-26 RX ORDER — MIDAZOLAM HYDROCHLORIDE 1 MG/ML
INJECTION INTRAMUSCULAR; INTRAVENOUS
Status: DISCONTINUED | OUTPATIENT
Start: 2024-09-26 | End: 2024-09-26 | Stop reason: HOSPADM

## 2024-09-26 RX ORDER — BUPIVACAINE HYDROCHLORIDE 2.5 MG/ML
INJECTION, SOLUTION EPIDURAL; INFILTRATION; INTRACAUDAL
Status: DISCONTINUED | OUTPATIENT
Start: 2024-09-26 | End: 2024-09-26 | Stop reason: HOSPADM

## 2024-09-26 RX ORDER — ONDANSETRON HYDROCHLORIDE 2 MG/ML
4 INJECTION, SOLUTION INTRAVENOUS ONCE AS NEEDED
Status: DISCONTINUED | OUTPATIENT
Start: 2024-09-26 | End: 2024-09-26 | Stop reason: HOSPADM

## 2024-09-26 RX ORDER — FENTANYL CITRATE 50 UG/ML
INJECTION, SOLUTION INTRAMUSCULAR; INTRAVENOUS
Status: DISCONTINUED | OUTPATIENT
Start: 2024-09-26 | End: 2024-09-26 | Stop reason: HOSPADM

## 2024-09-26 RX ORDER — CYCLOBENZAPRINE HCL 10 MG
10 TABLET ORAL 3 TIMES DAILY PRN
COMMUNITY

## 2024-09-26 NOTE — OP NOTE
Cervical Medial nerve branch block radiofrequency ablation Under Fluoroscopy     Time-out taken to identify patient and procedure side prior to starting the procedure.     09/26/2024    Patient has clinical and imaging findings suggestive of facet mediated pain and has completed 2 diagnostic medial branch blocks at specified levels with at least 80% relief for the expected duration of the local anesthetic utilized.    For supporting clinical documentation, please see most recent clinic and telephone notes.     PROCEDURE: Left radiofrequency ablation of the the medial branch nerves at the   transverse process of  C3, C4, C5    2)Conscious sedation provided by MD     REASON FOR PROCEDURE: Cervical spondylosis [M47.812]     PHYSICIAN: Roland Glass MD    ASSISTANTS: None     SEDATION: Conscious sedation provided by M.D    The patient was monitored with continuous pulse oximetry, EKG, and intermittent blood pressure monitors, immediately prior to administration of sedation.  The patient was hemodynamically stable throughout the entire process was responsive to voice, and breathing spontaneously.  Supplemental O2 was provided at 2L/min via nasal cannula.  Patient was comfortable for the duration of the procedure.     There was a total of 4mg IV Midazolam and 100mcg Fentanyl titrated for the procedure    Total sedation time was >10minutes and <20minutes      MEDICATIONS INJECTED: 0.25% Bupivicaine total 6mL     LOCAL ANESTHETIC USED: Xylocaine 1% 1mL / site     ESTIMATED BLOOD LOSS: None.     COMPLICATIONS: None.     Interval history: Patient reports that he had complete relief of pain for the day of the procedure, we will proceed with the RFA     TECHNIQUE: Laying in a prone position, the patient was prepped and draped in the usual sterile fashion using ChloraPrep and fenestrated drape. The level was determined under fluoroscopic guidance. Local anesthetic was given by going down to the hub of the 27-gauge 1.25in  needle and raising a wheel. A 18-gauge 10mm curved active tip needle was introduced to the anatomic local of the medial branch at each of the stated above levels using fluoroscopy. Then sensory and motor testing was performed to confirm that the needle tips were in the correct location. Then after negative aspiration, 1 mL of 0.25% bupivacaine was injected into each level. This was followed by thermal lesioning at 80 degrees celsius for 90 seconds.       The patient tolerated the procedure well. Did not have any procedure related motor deficit at the conclusion of the procedure    The patient was monitored after the procedure. Patient was given post procedure and discharge instructions to follow at home. We will see the patient back in two weeks or the patient may call to inform of status. The patient was discharged in a stable condition

## 2024-09-26 NOTE — DISCHARGE INSTRUCTIONS

## 2024-09-26 NOTE — DISCHARGE SUMMARY
Discharge Note  Short Stay      SUMMARY     Admit Date: 9/26/2024    Attending Physician: Roland Glass MD        Discharge Physician: Roland Glass MD        Discharge Date: 9/26/2024 9:21 AM    Procedure(s) (LRB):  Left C3-5 RFA (Left)    Final Diagnosis: Cervical spondylosis [M47.812]    Disposition: Home or self care    Patient Instructions:   Current Discharge Medication List        CONTINUE these medications which have NOT CHANGED    Details   cyclobenzaprine (FLEXERIL) 10 MG tablet Take 10 mg by mouth 3 (three) times daily as needed for Muscle spasms.      diazePAM (VALIUM) 5 MG tablet Take 1 tablet (5 mg total) by mouth once. 30-45 minutes prior to procedure for anxiety for 1 dose  Qty: 1 tablet, Refills: 0      ibuprofen (ADVIL,MOTRIN) 800 MG tablet Take 1 tablet (800 mg total) by mouth every 6 (six) hours as needed for Pain.  Qty: 28 tablet, Refills: 0      zolpidem (AMBIEN) 5 MG Tab       albuterol (PROVENTIL/VENTOLIN HFA) 90 mcg/actuation inhaler Inhale 1-2 puffs into the lungs every 6 (six) hours as needed for Wheezing.  Qty: 8 g, Refills: 0      medroxyPROGESTERone (DEPO-PROVERA) 150 mg/mL injection Inject 150 mg into the muscle every 3 (three) months.           STOP taking these medications       ALPRAZolam (XANAX) 1 MG tablet Comments:   Reason for Stopping:         methylPREDNISolone (MEDROL DOSEPACK) 4 mg tablet Comments:   Reason for Stopping:                   Discharge Diagnosis: Cervical spondylosis [M47.812]  Condition on Discharge: Stable with no complications to procedure   Diet on Discharge: Same as before.  Activity: as per instruction sheet.  Discharge to: Home with a responsible adult.  Follow up: 2-4 weeks       Please call the office at [phone] if you experience any weakness or loss of sensation, fever > 101.5, pain uncontrolled with oral medications, persistent nausea/vomiting/or diarrhea, redness or drainage from the incisions, or any other worrisome concerns. If  physician on call was not reached or could not communicate with our office for any reason please go to the nearest emergency department

## 2024-09-26 NOTE — H&P
Problem: Adult Inpatient Plan of Care  Goal: Plan of Care Review  Outcome: Ongoing, Progressing  Flowsheets (Taken 4/21/2023 2045)  Plan of Care Reviewed With: patient     Problem: Adult Inpatient Plan of Care  Goal: Patient-Specific Goal (Individualized)  Outcome: Ongoing, Progressing  Flowsheets (Taken 4/21/2023 2045)  Anxieties, Fears or Concerns: None  Individualized Care Needs:   Respiratory exercises with splinting to prevent pneumonia   telemetry monitoring   Enhanced safety measures     Problem: Adult Inpatient Plan of Care  Goal: Absence of Hospital-Acquired Illness or Injury  Intervention: Identify and Manage Fall Risk  Flowsheets (Taken 4/21/2023 2045)  Safety Promotion/Fall Prevention:   bed alarm set   assistive device/personal item within reach   Fall Risk reviewed with patient/family   Fall Risk signage in place   high risk medications identified   medications reviewed   room near unit station   /camera at bedside   instructed to call staff for mobility     Problem: Adult Inpatient Plan of Care  Goal: Absence of Hospital-Acquired Illness or Injury  Intervention: Prevent Infection  Flowsheets (Taken 4/21/2023 2045)  Infection Prevention:   hand hygiene promoted   equipment surfaces disinfected   rest/sleep promoted     Problem: Adult Inpatient Plan of Care  Goal: Optimal Comfort and Wellbeing  Intervention: Monitor Pain and Promote Comfort  Flowsheets (Taken 4/21/2023 2045)  Pain Management Interventions:   care clustered   pain management plan reviewed with patient/caregiver   medication offered but refused   quiet environment facilitated   relaxation techniques promoted     Problem: Adult Inpatient Plan of Care  Goal: Optimal Comfort and Wellbeing  Intervention: Provide Person-Centered Care  Flowsheets (Taken 4/21/2023 2045)  Trust Relationship/Rapport:   care explained   choices provided   emotional support provided   empathic listening provided   questions answered   questions  HPI  Patient presenting for Procedure(s) (LRB):  Left C3-5 RFA (Left)       No health changes since previous encounter    Past Medical History:   Diagnosis Date    Back pain     Neck pain     Scoliosis      Past Surgical History:   Procedure Laterality Date    INJECTION OF ANESTHETIC AGENT AROUND MEDIAL BRANCH NERVES INNERVATING CERVICAL FACET JOINT Left 5/19/2022    Procedure: bilateral T3-5 diagnostic MBB RN IV Sedation;  Surgeon: Roland Glass MD;  Location: [facility];  Service: Pain Management;  Laterality: Left;    INJECTION OF ANESTHETIC AGENT AROUND MEDIAL BRANCH NERVES INNERVATING CERVICAL FACET JOINT Bilateral 6/8/2023    Procedure: Bilateral C5-7 MBB 1st diagnostic with RN IV sedation;  Surgeon: Roland Glass MD;  Location: [facility];  Service: Pain Management;  Laterality: Bilateral;    INJECTION OF ANESTHETIC AGENT AROUND MEDIAL BRANCH NERVES INNERVATING CERVICAL FACET JOINT Bilateral 6/22/2023    Procedure: Bilateral C5-7 MBB (#2 diagnostic);  Surgeon: Roland Glass MD;  Location: [facility];  Service: Pain Management;  Laterality: Bilateral;    INJECTION OF ANESTHETIC AGENT AROUND MEDIAL BRANCH NERVES INNERVATING CERVICAL FACET JOINT Left 8/22/2024    Procedure: Diagnostic Left C3-5 MBB #1;  Surgeon: Roland Glass MD;  Location: [facility];  Service: Pain Management;  Laterality: Left;    INJECTION OF ANESTHETIC AGENT AROUND MEDIAL BRANCH NERVES INNERVATING CERVICAL FACET JOINT Left 9/12/2024    Procedure: Diagnostic Left C3-5 MBB #2;  Surgeon: Roland Glass MD;  Location: [facility];  Service: Pain Management;  Laterality: Left;    RADIOFREQUENCY THERMOCOAGULATION Left 7/25/2023    Procedure: Left C5-7 RFA with RN IV sedation;  Surgeon: Roland Glass MD;  Location: [facility];  Service: Pain Management;  Laterality: Left;    RADIOFREQUENCY THERMOCOAGULATION Right 8/22/2023    Procedure: Right C5-7 RFA with RN IV sedation;  Surgeon: Roland Glass MD;   "Location: [facility];  Service: Pain Management;  Laterality: Right;    RADIOFREQUENCY THERMOCOAGULATION Right 6/18/2024    Procedure: right C5-7 RFA with RN IV sedation;  Surgeon: Roland Glass MD;  Location: [facility];  Service: Pain Management;  Laterality: Right;     Review of patient's allergies indicates:   Allergen Reactions    Penicillins         No current facility-administered medications on file prior to encounter.     Current Outpatient Medications on File Prior to Encounter   Medication Sig Dispense Refill    cyclobenzaprine (FLEXERIL) 10 MG tablet Take 10 mg by mouth 3 (three) times daily as needed for Muscle spasms.      ibuprofen (ADVIL,MOTRIN) 800 MG tablet Take 1 tablet (800 mg total) by mouth every 6 (six) hours as needed for Pain. 28 tablet 0    zolpidem (AMBIEN) 5 MG Tab       albuterol (PROVENTIL/VENTOLIN HFA) 90 mcg/actuation inhaler Inhale 1-2 puffs into the lungs every 6 (six) hours as needed for Wheezing. 8 g 0    ALPRAZolam (XANAX) 1 MG tablet Take 1 tablet (1 mg total) by mouth once as needed for Anxiety (for MRI). 1 tablet 0    medroxyPROGESTERone (DEPO-PROVERA) 150 mg/mL injection Inject 150 mg into the muscle every 3 (three) months.      methylPREDNISolone (MEDROL DOSEPACK) 4 mg tablet use as directed 21 each 0        PMHx, PSHx, Allergies, Medications reviewed in epic    ROS negative except pain complaints in HPI    OBJECTIVE:    /81 (BP Location: Right arm, Patient Position: Sitting)   Pulse 104   Temp 97.9 °F (36.6 °C) (Oral)   Resp 16   Ht 5' 5" (1.651 m)   Wt 53.3 kg (117 lb 6.3 oz)   LMP  (LMP Unknown)   SpO2 100%   Breastfeeding No   BMI 19.54 kg/m²     PHYSICAL EXAMINATION:    GENERAL: Well appearing, in no acute distress, alert and oriented x3.  PSYCH:  Mood and affect appropriate.  SKIN: Skin color, texture, turgor normal, no rashes or lesions which will impact the procedure.  CV: RRR with palpation of the radial artery.  PULM: No evidence of " encouraged   reassurance provided   thoughts/feelings acknowledged     Problem: Infection  Goal: Absence of Infection Signs and Symptoms  Description: Goals to be met by: 5/5/2023     Patient will increase functional independence with ADLs by performing:    UE Dressing with Modified [place].  LE Dressing with Modified [place].  Toileting from toilet with Supervision for hygiene and clothing management.   Toilet transfer to toilet with Supervision.    Intervention: Prevent or Manage Infection  Flowsheets (Taken 4/21/2023 2045)  Fever Reduction/Comfort Measures:   lightweight bedding   lightweight clothing  Infection Management: aseptic technique maintained     Problem: Activity Intolerance (Cardiovascular Surgery)  Goal: Improved Activity Tolerance  Intervention: Optimize Tolerance for Activity  Flowsheets (Taken 4/21/2023 2045)  Self-Care Promotion: independence encouraged  Environmental Support:   calm environment promoted   rest periods encouraged      respiratory difficulty, symmetric chest rise. Clear to auscultation.  NEURO: Cranial nerves grossly intact.    Plan:    Proceed with procedure as planned Procedure(s) (LRB):  Left C3-5 RFA (Left)    Roland Glass MD  09/26/2024

## 2024-10-22 ENCOUNTER — PATIENT MESSAGE (OUTPATIENT)
Dept: RESEARCH | Facility: HOSPITAL | Age: 24
End: 2024-10-22
Payer: MEDICAID

## 2024-10-31 ENCOUNTER — PATIENT MESSAGE (OUTPATIENT)
Dept: PAIN MEDICINE | Facility: CLINIC | Age: 24
End: 2024-10-31
Payer: MEDICAID
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
## 2024-11-05 ENCOUNTER — TELEPHONE (OUTPATIENT)
Dept: PAIN MEDICINE | Facility: CLINIC | Age: 24
End: 2024-11-05

## 2024-11-05 ENCOUNTER — OFFICE VISIT (OUTPATIENT)
Dept: PAIN MEDICINE | Facility: CLINIC | Age: 24
End: 2024-11-05
Payer: MEDICAID

## 2024-11-05 VITALS — BODY MASS INDEX: 19.54 KG/M2 | HEIGHT: 65 IN

## 2024-11-05 DIAGNOSIS — M79.12 MYALGIA OF AUXILIARY MUSCLES, HEAD AND NECK: ICD-10-CM

## 2024-11-05 DIAGNOSIS — M54.2 NECK PAIN ON LEFT SIDE: ICD-10-CM

## 2024-11-05 DIAGNOSIS — M54.2 CERVICAL MUSCLE PAIN: ICD-10-CM

## 2024-11-05 DIAGNOSIS — G44.86 CERVICOGENIC HEADACHE: ICD-10-CM

## 2024-11-05 DIAGNOSIS — M47.812 CERVICAL SPONDYLOSIS: Primary | ICD-10-CM

## 2024-11-05 PROCEDURE — 1160F RVW MEDS BY RX/DR IN RCRD: CPT | Mod: CPTII,95,, | Performed by: PHYSICIAN ASSISTANT

## 2024-11-05 PROCEDURE — 3008F BODY MASS INDEX DOCD: CPT | Mod: CPTII,95,, | Performed by: PHYSICIAN ASSISTANT

## 2024-11-05 PROCEDURE — 99214 OFFICE O/P EST MOD 30 MIN: CPT | Mod: 95,,, | Performed by: PHYSICIAN ASSISTANT

## 2024-11-05 PROCEDURE — 1159F MED LIST DOCD IN RCRD: CPT | Mod: CPTII,95,, | Performed by: PHYSICIAN ASSISTANT

## 2024-11-05 RX ORDER — CHLORZOXAZONE 500 MG/1
500 TABLET ORAL 3 TIMES DAILY PRN
Qty: 90 TABLET | Refills: 1 | Status: SHIPPED | OUTPATIENT
Start: 2024-11-05

## 2024-11-18 ENCOUNTER — LAB VISIT (OUTPATIENT)
Dept: LAB | Facility: HOSPITAL | Age: 24
End: 2024-11-18
Attending: INTERNAL MEDICINE
Payer: MEDICAID

## 2024-11-18 DIAGNOSIS — E03.9 MYXEDEMA HEART DISEASE: ICD-10-CM

## 2024-11-18 DIAGNOSIS — I51.9 MYXEDEMA HEART DISEASE: ICD-10-CM

## 2024-11-18 LAB
T4 FREE SERPL-MCNC: 0.93 NG/DL (ref 0.71–1.51)
TSH SERPL DL<=0.005 MIU/L-ACNC: 1.89 UIU/ML (ref 0.4–4)

## 2024-11-18 PROCEDURE — 84439 ASSAY OF FREE THYROXINE: CPT | Mod: PO | Performed by: INTERNAL MEDICINE

## 2024-11-18 PROCEDURE — 84443 ASSAY THYROID STIM HORMONE: CPT | Mod: PO | Performed by: INTERNAL MEDICINE

## 2024-11-18 PROCEDURE — 36415 COLL VENOUS BLD VENIPUNCTURE: CPT | Mod: PO | Performed by: INTERNAL MEDICINE

## 2025-02-03 ENCOUNTER — OFFICE VISIT (OUTPATIENT)
Dept: PAIN MEDICINE | Facility: CLINIC | Age: 25
End: 2025-02-03
Payer: MEDICAID

## 2025-02-03 VITALS — BODY MASS INDEX: 19.54 KG/M2 | HEIGHT: 65 IN

## 2025-02-03 DIAGNOSIS — M79.12 MYALGIA OF AUXILIARY MUSCLES, HEAD AND NECK: ICD-10-CM

## 2025-02-03 DIAGNOSIS — M47.812 CERVICAL SPONDYLOSIS: Primary | ICD-10-CM

## 2025-02-03 DIAGNOSIS — G44.86 CERVICOGENIC HEADACHE: ICD-10-CM

## 2025-02-03 DIAGNOSIS — M54.2 CERVICAL MUSCLE PAIN: ICD-10-CM

## 2025-02-03 PROCEDURE — 1160F RVW MEDS BY RX/DR IN RCRD: CPT | Mod: CPTII,95,, | Performed by: PHYSICIAN ASSISTANT

## 2025-02-03 PROCEDURE — 1159F MED LIST DOCD IN RCRD: CPT | Mod: CPTII,95,, | Performed by: PHYSICIAN ASSISTANT

## 2025-02-03 PROCEDURE — 98006 SYNCH AUDIO-VIDEO EST MOD 30: CPT | Mod: 95,,, | Performed by: PHYSICIAN ASSISTANT
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
## 2025-02-04 ENCOUNTER — PATIENT MESSAGE (OUTPATIENT)
Dept: PAIN MEDICINE | Facility: CLINIC | Age: 25
End: 2025-02-04
Payer: MEDICAID

## 2025-02-04 ENCOUNTER — HOSPITAL ENCOUNTER (OUTPATIENT)
Dept: RADIOLOGY | Facility: HOSPITAL | Age: 25
Discharge: HOME | End: 2025-02-04
Attending: NURSE PRACTITIONER
Payer: MEDICAID

## 2025-02-04 DIAGNOSIS — N73.9 FEMALE PELVIC INFLAMMATORY DISEASE, UNSPECIFIED: ICD-10-CM

## 2025-02-04 PROCEDURE — 76856 US EXAM PELVIC COMPLETE: CPT | Mod: TC,PO

## 2025-02-04 PROCEDURE — 76856 US EXAM PELVIC COMPLETE: CPT | Mod: 26,,, | Performed by: RADIOLOGY

## 2025-02-04 NOTE — TELEPHONE ENCOUNTER
I called the patient and got her scheduled for her RFA and also follow up appointment as well.    Jodie AGEE ([facility])

## 2025-02-05 RX ORDER — DIAZEPAM 5 MG/1
5 TABLET ORAL ONCE
Qty: 1 TABLET | Refills: 0 | Status: SHIPPED | OUTPATIENT
Start: 2025-02-05 | End: 2025-02-18

## 2025-02-05 NOTE — PRE-PROCEDURE INSTRUCTIONS
Spoke with patient regarding procedure scheduled on 2.18     Arrival time 0945     Has patient been sick with fever or on antibiotics within the last 7 days? No     Does the patient have any open wounds, sores or rashes? No     Does the patient have any recent fractures? no     Has patient received a vaccination within the last 7 days? No     Received the COVID vaccination?      Has the patient stopped all medications as directed? na     Does patient have a pacemaker, defibrillator, or implantable stimulator? No     Does the patient have a ride to and from procedure and someone reliable to remain with patient? polyanna     Is the patient diabetic? no      Does the patient have sleep apnea? Or use O2 at home? no     Is the patient receiving sedation?      Is the patient instructed to remain NPO beginning at midnight the night before their procedure? yes     Procedure location confirmed with patient? Yes     Covid- Denies signs/symptoms. Instructed to notify PAT/MD if any changes.

## 2025-02-17 ENCOUNTER — PATIENT MESSAGE (OUTPATIENT)
Dept: NEUROLOGY | Facility: CLINIC | Age: 25
End: 2025-02-17
Payer: MEDICAID

## 2025-02-17 RX ORDER — ONDANSETRON HYDROCHLORIDE 2 MG/ML
4 INJECTION, SOLUTION INTRAVENOUS ONCE AS NEEDED
OUTPATIENT
Start: 2025-02-17 | End: 2036-07-16

## 2025-02-18 ENCOUNTER — HOSPITAL ENCOUNTER (OUTPATIENT)
Facility: HOSPITAL | Age: 25
Discharge: HOME | End: 2025-02-18
Attending: PHYSICAL MEDICINE & REHABILITATION | Admitting: PHYSICAL MEDICINE & REHABILITATION
Payer: MEDICAID

## 2025-02-18 VITALS
WEIGHT: 127.31 LBS | SYSTOLIC BLOOD PRESSURE: 126 MMHG | OXYGEN SATURATION: 99 % | DIASTOLIC BLOOD PRESSURE: 58 MMHG | HEART RATE: 79 BPM | HEIGHT: 65 IN | RESPIRATION RATE: 15 BRPM | BODY MASS INDEX: 21.21 KG/M2 | TEMPERATURE: 98 F

## 2025-02-18 DIAGNOSIS — M47.812 CERVICAL SPONDYLOSIS: Primary | ICD-10-CM

## 2025-02-18 PROCEDURE — 99152 MOD SED SAME PHYS/QHP 5/>YRS: CPT | Performed by: PHYSICAL MEDICINE & REHABILITATION

## 2025-02-18 PROCEDURE — 63600175 PHARM REV CODE 636 W HCPCS: Performed by: PHYSICAL MEDICINE & REHABILITATION

## 2025-02-18 PROCEDURE — 64634 DESTROY C/TH FACET JNT ADDL: CPT | Mod: RT,,, | Performed by: PHYSICAL MEDICINE & REHABILITATION

## 2025-02-18 PROCEDURE — 64633 DESTROY CERV/THOR FACET JNT: CPT | Mod: RT,,, | Performed by: PHYSICAL MEDICINE & REHABILITATION

## 2025-02-18 PROCEDURE — 64633 DESTROY CERV/THOR FACET JNT: CPT | Mod: RT | Performed by: PHYSICAL MEDICINE & REHABILITATION

## 2025-02-18 PROCEDURE — 64634 DESTROY C/TH FACET JNT ADDL: CPT | Mod: RT | Performed by: PHYSICAL MEDICINE & REHABILITATION

## 2025-02-18 RX ORDER — MIDAZOLAM HYDROCHLORIDE 1 MG/ML
INJECTION, SOLUTION INTRAMUSCULAR; INTRAVENOUS
Status: DISCONTINUED | OUTPATIENT
Start: 2025-02-18 | End: 2025-02-18 | Stop reason: HOSPADM

## 2025-02-18 RX ORDER — BUPIVACAINE HYDROCHLORIDE 2.5 MG/ML
INJECTION, SOLUTION EPIDURAL; INFILTRATION; INTRACAUDAL
Status: DISCONTINUED | OUTPATIENT
Start: 2025-02-18 | End: 2025-02-18 | Stop reason: HOSPADM

## 2025-02-18 RX ORDER — FENTANYL CITRATE 50 UG/ML
INJECTION, SOLUTION INTRAMUSCULAR; INTRAVENOUS
Status: DISCONTINUED | OUTPATIENT
Start: 2025-02-18 | End: 2025-02-18 | Stop reason: HOSPADM

## 2025-02-18 NOTE — DISCHARGE SUMMARY
Discharge Note  Short Stay      SUMMARY     Admit Date: 2/18/2025    Attending Physician: Roland Glass MD        Discharge Physician: Roland Glass MD        Discharge Date: 2/18/2025 10:58 AM    Procedure(s) (LRB):  Right C3-5 RFA (Bilateral)    Final Diagnosis: Cervical spondylosis [M47.812]    Disposition: Home or self care    Patient Instructions:   Current Discharge Medication List        CONTINUE these medications which have NOT CHANGED    Details   diazePAM (VALIUM) 5 MG tablet Take 1 tablet (5 mg total) by mouth once. 45 minutes to 1 hour prior to procedure for procedural anxiety for 1 dose  Qty: 1 tablet, Refills: 0      albuterol (PROVENTIL/VENTOLIN HFA) 90 mcg/actuation inhaler Inhale 1-2 puffs into the lungs every 6 (six) hours as needed for Wheezing.  Qty: 8 g, Refills: 0      chlorzoxazone (PARAFON FORTE) 500 mg Tab Take 1 tablet (500 mg total) by mouth 3 (three) times daily as needed (muscle spasms). May cause drowsiness.  Qty: 90 tablet, Refills: 1    Associated Diagnoses: Cervical muscle pain      ibuprofen (ADVIL,MOTRIN) 800 MG tablet Take 1 tablet (800 mg total) by mouth every 6 (six) hours as needed for Pain.  Qty: 28 tablet, Refills: 0      medroxyPROGESTERone (DEPO-PROVERA) 150 mg/mL injection Inject 150 mg into the muscle every 3 (three) months.      zolpidem (AMBIEN) 5 MG Tab                  Discharge Diagnosis: Cervical spondylosis [M47.812]  Condition on Discharge: Stable with no complications to procedure   Diet on Discharge: Same as before.  Activity: as per instruction sheet.  Discharge to: Home with a responsible adult.  Follow up: 2-4 weeks       Please call the office at [phone] if you experience any weakness or loss of sensation, fever > 101.5, pain uncontrolled with oral medications, persistent nausea/vomiting/or diarrhea, redness or drainage from the incisions, or any other worrisome concerns. If physician on call was not reached or could not communicate with our  office for any reason please go to the nearest emergency department

## 2025-02-18 NOTE — OP NOTE
Cervical Medial nerve branch block radiofrequency ablation Under Fluoroscopy     Time-out taken to identify patient and procedure side prior to starting the procedure.     02/18/2025    Patient has clinical and imaging findings suggestive of facet mediated pain and has completed 2 diagnostic medial branch blocks at specified levels with at least 80% relief for the expected duration of the local anesthetic utilized.    For supporting clinical documentation, please see most recent clinic and telephone notes.     PROCEDURE: Right radiofrequency ablation of the the medial branch nerves at the   transverse process of  C3, C4, C5    2)Conscious sedation provided by MD     REASON FOR PROCEDURE: Cervical spondylosis [M47.812]     PHYSICIAN: Roland Glass MD    ASSISTANTS: None     SEDATION: Conscious sedation provided by M.D    The patient was monitored with continuous pulse oximetry, EKG, and intermittent blood pressure monitors, immediately prior to administration of sedation.  The patient was hemodynamically stable throughout the entire process was responsive to voice, and breathing spontaneously.  Supplemental O2 was provided at 2L/min via nasal cannula.  Patient was comfortable for the duration of the procedure.     There was a total of 4mg IV Midazolam and 100mcg Fentanyl titrated for the procedure    Total sedation time was >10minutes and <20minutes      MEDICATIONS INJECTED: 0.25% Bupivicaine total 6mL     LOCAL ANESTHETIC USED: Xylocaine 1% 1mL / site     ESTIMATED BLOOD LOSS: None.     COMPLICATIONS: None.     Interval history: Patient reports that he had complete relief of pain for the day of the procedure, we will proceed with the RFA     TECHNIQUE: Laying in a prone position, the patient was prepped and draped in the usual sterile fashion using ChloraPrep and fenestrated drape. The level was determined under fluoroscopic guidance. Local anesthetic was given by going down to the hub of the 27-gauge 1.25in  needle and raising a wheel. A 18-gauge 10mm curved active tip needle was introduced to the anatomic local of the medial branch at each of the stated above levels using fluoroscopy. Then sensory and motor testing was performed to confirm that the needle tips were in the correct location. Then after negative aspiration, 1 mL of 0.25% bupivacaine was injected into each level. This was followed by thermal lesioning at 80 degrees celsius for 90 seconds.       The patient tolerated the procedure well. Did not have any procedure related motor deficit at the conclusion of the procedure    The patient was monitored after the procedure. Patient was given post procedure and discharge instructions to follow at home. We will see the patient back in two weeks or the patient may call to inform of status. The patient was discharged in a stable condition

## 2025-02-18 NOTE — H&P
HPI  Patient presenting for Procedure(s) (LRB):  Right C3-5 RFA (Bilateral)     No health changes since previous encounter    Past Medical History:   Diagnosis Date    Back pain     Neck pain     Scoliosis      Past Surgical History:   Procedure Laterality Date    INJECTION OF ANESTHETIC AGENT AROUND MEDIAL BRANCH NERVES INNERVATING CERVICAL FACET JOINT Left 5/19/2022    Procedure: bilateral T3-5 diagnostic MBB RN IV Sedation;  Surgeon: Roland Glass MD;  Location: [facility];  Service: Pain Management;  Laterality: Left;    INJECTION OF ANESTHETIC AGENT AROUND MEDIAL BRANCH NERVES INNERVATING CERVICAL FACET JOINT Bilateral 6/8/2023    Procedure: Bilateral C5-7 MBB 1st diagnostic with RN IV sedation;  Surgeon: Roland Glass MD;  Location: [facility];  Service: Pain Management;  Laterality: Bilateral;    INJECTION OF ANESTHETIC AGENT AROUND MEDIAL BRANCH NERVES INNERVATING CERVICAL FACET JOINT Bilateral 6/22/2023    Procedure: Bilateral C5-7 MBB (#2 diagnostic);  Surgeon: Roland Glass MD;  Location: [facility];  Service: Pain Management;  Laterality: Bilateral;    INJECTION OF ANESTHETIC AGENT AROUND MEDIAL BRANCH NERVES INNERVATING CERVICAL FACET JOINT Left 8/22/2024    Procedure: Diagnostic Left C3-5 MBB #1;  Surgeon: Roland Glass MD;  Location: [facility];  Service: Pain Management;  Laterality: Left;    INJECTION OF ANESTHETIC AGENT AROUND MEDIAL BRANCH NERVES INNERVATING CERVICAL FACET JOINT Left 9/12/2024    Procedure: Diagnostic Left C3-5 MBB #2;  Surgeon: Roland Glass MD;  Location: [facility];  Service: Pain Management;  Laterality: Left;    RADIOFREQUENCY THERMOCOAGULATION Left 7/25/2023    Procedure: Left C5-7 RFA with RN IV sedation;  Surgeon: Roland Glass MD;  Location: [facility];  Service: Pain Management;  Laterality: Left;    RADIOFREQUENCY THERMOCOAGULATION Right 8/22/2023    Procedure: Right C5-7 RFA with RN IV sedation;  Surgeon: Roland Glass  "MD;  Location: [facility];  Service: Pain Management;  Laterality: Right;    RADIOFREQUENCY THERMOCOAGULATION Right 6/18/2024    Procedure: right C5-7 RFA with RN IV sedation;  Surgeon: Roland Glass MD;  Location: [facility];  Service: Pain Management;  Laterality: Right;    RADIOFREQUENCY THERMOCOAGULATION Left 9/26/2024    Procedure: Left C3-5 RFA;  Surgeon: Roland Glass MD;  Location: [facility];  Service: Pain Management;  Laterality: Left;     Review of patient's allergies indicates:   Allergen Reactions    Penicillins         Medications Ordered Prior to Encounter[1]     PMHx, PSHx, Allergies, Medications reviewed in epic    ROS negative except pain complaints in HPI    OBJECTIVE:    BP (!) 143/68 (BP Location: Right arm, Patient Position: Sitting)   Pulse 94   Temp 98 °F (36.7 °C) (Temporal)   Resp 18   Ht 5' 5" (1.651 m)   Wt 57.7 kg (127 lb 5.1 oz)   SpO2 98%   Breastfeeding No   BMI 21.19 kg/m²     PHYSICAL EXAMINATION:    GENERAL: Well appearing, in no acute distress, alert and oriented x3.  PSYCH:  Mood and affect appropriate.  SKIN: Skin color, texture, turgor normal, no rashes or lesions which will impact the procedure.  CV: RRR with palpation of the radial artery.  PULM: No evidence of respiratory difficulty, symmetric chest rise. Clear to auscultation.  NEURO: Cranial nerves grossly intact.    Plan:    Proceed with procedure as planned Procedure(s) (LRB):  Right C3-5 RFA (Bilateral)    Roland Glass MD  02/18/2025                 [1]   No current facility-administered medications on file prior to encounter.     Current Outpatient Medications on File Prior to Encounter   Medication Sig Dispense Refill    albuterol (PROVENTIL/VENTOLIN HFA) 90 mcg/actuation inhaler Inhale 1-2 puffs into the lungs every 6 (six) hours as needed for Wheezing. 8 g 0    chlorzoxazone (PARAFON FORTE) 500 mg Tab Take 1 tablet (500 mg total) by mouth 3 (three) times daily as needed (muscle " spasms). May cause drowsiness. 90 tablet 1    ibuprofen (ADVIL,MOTRIN) 800 MG tablet Take 1 tablet (800 mg total) by mouth every 6 (six) hours as needed for Pain. 28 tablet 0    medroxyPROGESTERone (DEPO-PROVERA) 150 mg/mL injection Inject 150 mg into the muscle every 3 (three) months.      zolpidem (AMBIEN) 5 MG Tab

## 2025-02-18 NOTE — DISCHARGE INSTRUCTIONS

## 2025-02-26 ENCOUNTER — PATIENT MESSAGE (OUTPATIENT)
Dept: PAIN MEDICINE | Facility: CLINIC | Age: 25
End: 2025-02-26
Payer: MEDICAID

## 2025-02-26 RX ORDER — IBUPROFEN 800 MG/1
800 TABLET ORAL EVERY 6 HOURS PRN
Qty: 28 TABLET | Refills: 0 | Status: SHIPPED | OUTPATIENT
Start: 2025-02-26

## 2025-03-04 ENCOUNTER — PATIENT MESSAGE (OUTPATIENT)
Dept: PAIN MEDICINE | Facility: CLINIC | Age: 25
End: 2025-03-04
Payer: MEDICAID

## 2025-03-05 ENCOUNTER — TELEPHONE (OUTPATIENT)
Dept: PAIN MEDICINE | Facility: CLINIC | Age: 25
End: 2025-03-05

## 2025-03-05 ENCOUNTER — OFFICE VISIT (OUTPATIENT)
Dept: PAIN MEDICINE | Facility: CLINIC | Age: 25
End: 2025-03-05
Payer: MEDICAID

## 2025-03-05 VITALS — HEIGHT: 65 IN | RESPIRATION RATE: 17 BRPM | BODY MASS INDEX: 21.19 KG/M2

## 2025-03-05 DIAGNOSIS — M79.12 MYALGIA OF AUXILIARY MUSCLES, HEAD AND NECK: ICD-10-CM

## 2025-03-05 DIAGNOSIS — M47.812 CERVICAL SPONDYLOSIS: Primary | ICD-10-CM

## 2025-03-05 DIAGNOSIS — G89.4 CHRONIC PAIN SYNDROME: ICD-10-CM

## 2025-03-05 DIAGNOSIS — M54.2 CERVICAL MUSCLE PAIN: ICD-10-CM

## 2025-03-05 DIAGNOSIS — M79.7 FIBROMYALGIA: ICD-10-CM

## 2025-03-05 DIAGNOSIS — G44.86 CERVICOGENIC HEADACHE: ICD-10-CM
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
## 2025-03-12 NOTE — PRE-PROCEDURE INSTRUCTIONS
Spoke with patient wife regarding procedure scheduled on 3.25     Arrival time 0630     Has patient been sick with fever or on antibiotics within the last 7 days? No     Does the patient have any open wounds, sores or rashes? No     Does the patient have any recent fractures? no     Has patient received a vaccination within the last 7 days? No     Received the COVID vaccination?      Has the patient stopped all medications as directed? na     Does patient have a pacemaker, defibrillator, or implantable stimulator? NONE     Does the patient have a ride to and from procedure and someone reliable to remain with patient?  serafin     Is the patient diabetic? no     Does the patient have sleep apnea? Or use O2 at home? no     Is the patient receiving sedation?      Is the patient instructed to remain NPO beginning at midnight the night before their procedure? yes     Procedure location confirmed with patient? Yes     Covid- Denies signs/symptoms. Instructed to notify PAT/MD if any changes.

## 2025-03-19 DIAGNOSIS — F41.9 ANXIETY: Primary | ICD-10-CM

## 2025-03-19 RX ORDER — DIAZEPAM 5 MG/1
5 TABLET ORAL ONCE
Qty: 1 TABLET | Refills: 0 | Status: SHIPPED | OUTPATIENT
Start: 2025-03-19 | End: 2025-03-19

## 2025-03-19 NOTE — TELEPHONE ENCOUNTER
I called the patient and notified her about the valium and she does understand.    Jodie AGEE ([facility])

## 2025-03-25 ENCOUNTER — HOSPITAL ENCOUNTER (OUTPATIENT)
Facility: HOSPITAL | Age: 25
Discharge: HOME | End: 2025-03-25
Attending: PHYSICAL MEDICINE & REHABILITATION | Admitting: PHYSICAL MEDICINE & REHABILITATION
Payer: MEDICAID

## 2025-03-25 VITALS
TEMPERATURE: 97 F | OXYGEN SATURATION: 100 % | SYSTOLIC BLOOD PRESSURE: 127 MMHG | DIASTOLIC BLOOD PRESSURE: 85 MMHG | BODY MASS INDEX: 20.46 KG/M2 | HEIGHT: 65 IN | WEIGHT: 122.81 LBS | RESPIRATION RATE: 15 BRPM | HEART RATE: 77 BPM

## 2025-03-25 DIAGNOSIS — M47.812 CERVICAL SPONDYLOSIS: Primary | ICD-10-CM

## 2025-03-25 LAB
B-HCG UR QL: NEGATIVE
CTP QC/QA: YES

## 2025-03-25 PROCEDURE — 99152 MOD SED SAME PHYS/QHP 5/>YRS: CPT | Performed by: PHYSICAL MEDICINE & REHABILITATION

## 2025-03-25 PROCEDURE — 64634 DESTROY C/TH FACET JNT ADDL: CPT | Mod: 50,,, | Performed by: PHYSICAL MEDICINE & REHABILITATION

## 2025-03-25 PROCEDURE — 64634 DESTROY C/TH FACET JNT ADDL: CPT | Mod: 50 | Performed by: PHYSICAL MEDICINE & REHABILITATION

## 2025-03-25 PROCEDURE — 81025 URINE PREGNANCY TEST: CPT | Performed by: PHYSICAL MEDICINE & REHABILITATION

## 2025-03-25 PROCEDURE — 64633 DESTROY CERV/THOR FACET JNT: CPT | Mod: 50 | Performed by: PHYSICAL MEDICINE & REHABILITATION

## 2025-03-25 PROCEDURE — 64633 DESTROY CERV/THOR FACET JNT: CPT | Mod: 50,,, | Performed by: PHYSICAL MEDICINE & REHABILITATION

## 2025-03-25 PROCEDURE — 63600175 PHARM REV CODE 636 W HCPCS: Performed by: PHYSICAL MEDICINE & REHABILITATION

## 2025-03-25 RX ORDER — LIDOCAINE HYDROCHLORIDE 10 MG/ML
INJECTION, SOLUTION EPIDURAL; INFILTRATION; INTRACAUDAL; PERINEURAL
Status: DISCONTINUED | OUTPATIENT
Start: 2025-03-25 | End: 2025-03-25 | Stop reason: HOSPADM

## 2025-03-25 RX ORDER — ONDANSETRON HYDROCHLORIDE 2 MG/ML
4 INJECTION, SOLUTION INTRAVENOUS ONCE AS NEEDED
Status: DISCONTINUED | OUTPATIENT
Start: 2025-03-25 | End: 2025-03-25 | Stop reason: HOSPADM

## 2025-03-25 RX ORDER — FENTANYL CITRATE 50 UG/ML
INJECTION, SOLUTION INTRAMUSCULAR; INTRAVENOUS
Status: DISCONTINUED | OUTPATIENT
Start: 2025-03-25 | End: 2025-03-25 | Stop reason: HOSPADM

## 2025-03-25 RX ORDER — MIDAZOLAM HYDROCHLORIDE 1 MG/ML
INJECTION, SOLUTION INTRAMUSCULAR; INTRAVENOUS
Status: DISCONTINUED | OUTPATIENT
Start: 2025-03-25 | End: 2025-03-25 | Stop reason: HOSPADM

## 2025-03-25 RX ORDER — BUPIVACAINE HYDROCHLORIDE 2.5 MG/ML
INJECTION, SOLUTION EPIDURAL; INFILTRATION; INTRACAUDAL; PERINEURAL
Status: DISCONTINUED | OUTPATIENT
Start: 2025-03-25 | End: 2025-03-25 | Stop reason: HOSPADM

## 2025-03-25 NOTE — DISCHARGE INSTRUCTIONS

## 2025-03-25 NOTE — DISCHARGE SUMMARY
Discharge Note  Short Stay      SUMMARY     Admit Date: 3/25/2025    Attending Physician: Roland Glass MD        Discharge Physician: Roland Glass MD        Discharge Date: 3/25/2025 7:59 AM    Procedure(s) (LRB):  Bilateral C5-7 RFA (Bilateral)    Final Diagnosis: Cervical spondylosis [M47.812]    Disposition: Home or self care    Patient Instructions:   Current Discharge Medication List        CONTINUE these medications which have NOT CHANGED    Details   chlorzoxazone (PARAFON FORTE) 500 mg Tab Take 1 tablet (500 mg total) by mouth 3 (three) times daily as needed (muscle spasms). May cause drowsiness.  Qty: 90 tablet, Refills: 1    Associated Diagnoses: Cervical muscle pain      diazePAM (VALIUM) 5 MG tablet Take 1 tablet (5 mg total) by mouth once. 45 minutes to 1 hour prior to procedure for procedural anxiety for 1 dose  Qty: 1 tablet, Refills: 0    Associated Diagnoses: Anxiety      ibuprofen (ADVIL,MOTRIN) 800 MG tablet Take 1 tablet (800 mg total) by mouth every 6 (six) hours as needed for Pain.  Qty: 28 tablet, Refills: 0      albuterol (PROVENTIL/VENTOLIN HFA) 90 mcg/actuation inhaler Inhale 1-2 puffs into the lungs every 6 (six) hours as needed for Wheezing.  Qty: 8 g, Refills: 0      medroxyPROGESTERone (DEPO-PROVERA) 150 mg/mL injection Inject 150 mg into the muscle every 3 (three) months.      zolpidem (AMBIEN) 5 MG Tab                  Discharge Diagnosis: Cervical spondylosis [M47.812]  Condition on Discharge: Stable with no complications to procedure   Diet on Discharge: Same as before.  Activity: as per instruction sheet.  Discharge to: Home with a responsible adult.  Follow up: 2-4 weeks       Please call the office at [phone] if you experience any weakness or loss of sensation, fever > 101.5, pain uncontrolled with oral medications, persistent nausea/vomiting/or diarrhea, redness or drainage from the incisions, or any other worrisome concerns. If physician on call was not reached  or could not communicate with our office for any reason please go to the nearest emergency department

## 2025-03-25 NOTE — OP NOTE
Cervical Medial nerve branch block radiofrequency ablation Under Fluoroscopy     Time-out taken to identify patient and procedure side prior to starting the procedure.     03/25/2025    Patient has clinical and imaging findings suggestive of recurrent facet mediated pain. Patient has undergone previous RFAs at specified levels with at least 50% relief for at least 6 months. Successful diagnostic medial branch blocks have been completed within the past 2 years.    For supporting clinical documentation, please see most recent clinic and telephone notes.     PROCEDURE: Bilateral radiofrequency ablation of the the medial branch nerves at the   transverse process of   C5, C6, C7    2)Conscious sedation provided by MD     REASON FOR PROCEDURE: Cervical spondylosis [M47.812]     PHYSICIAN: Roland Glsas MD    ASSISTANTS: None     SEDATION: Conscious sedation provided by M.D    The patient was monitored with continuous pulse oximetry, EKG, and intermittent blood pressure monitors, immediately prior to administration of sedation.  The patient was hemodynamically stable throughout the entire process was responsive to voice, and breathing spontaneously.  Supplemental O2 was provided at 2L/min via nasal cannula.  Patient was comfortable for the duration of the procedure.     There was a total of 4mg IV Midazolam and 100mcg Fentanyl titrated for the procedure    Total sedation time was >10minutes and <20minutes      MEDICATIONS INJECTED: 0.25% Bupivicaine total 6mL     LOCAL ANESTHETIC USED: Xylocaine 1% 1mL / site     ESTIMATED BLOOD LOSS: None.     COMPLICATIONS: None    Interval history: Patient reports that she had complete relief of pain for the day of the procedure, we will proceed with the RFA     TECHNIQUE: Laying in a prone position, the patient was prepped and draped in the usual sterile fashion using ChloraPrep and fenestrated drape. The level was determined under fluoroscopic guidance. Local anesthetic was given  by going down to the hub of the 27-gauge 1.25in needle and raising a wheel. A 18-gauge 10mm curved active tip needle was introduced to the anatomic local of the medial branch at each of the stated above levels using fluoroscopy. Then sensory and motor testing was performed to confirm that the needle tips were in the correct location. Then after negative aspiration, 1 mL of 0.25% bupivacaine was injected into each level. This was followed by thermal lesioning at 80 degrees celsius for 90 seconds.       The patient tolerated the procedure well. Did not have any procedure related motor deficit at the conclusion of the procedure    The patient was monitored after the procedure. Patient was given post procedure and discharge instructions to follow at home. We will see the patient back in two weeks or the patient may call to inform of status. The patient was discharged in a stable condition

## 2025-03-25 NOTE — H&P
HPI  Patient presenting for Procedure(s) (LRB):  Bilateral C5-7 RFA (Bilateral)     No health changes since previous encounter    Past Medical History:   Diagnosis Date    Back pain     Neck pain     Scoliosis      Past Surgical History:   Procedure Laterality Date    INJECTION OF ANESTHETIC AGENT AROUND MEDIAL BRANCH NERVES INNERVATING CERVICAL FACET JOINT Left 5/19/2022    Procedure: bilateral T3-5 diagnostic MBB RN IV Sedation;  Surgeon: Roland Glass MD;  Location: [facility];  Service: Pain Management;  Laterality: Left;    INJECTION OF ANESTHETIC AGENT AROUND MEDIAL BRANCH NERVES INNERVATING CERVICAL FACET JOINT Bilateral 6/8/2023    Procedure: Bilateral C5-7 MBB 1st diagnostic with RN IV sedation;  Surgeon: Roland Glass MD;  Location: [facility];  Service: Pain Management;  Laterality: Bilateral;    INJECTION OF ANESTHETIC AGENT AROUND MEDIAL BRANCH NERVES INNERVATING CERVICAL FACET JOINT Bilateral 6/22/2023    Procedure: Bilateral C5-7 MBB (#2 diagnostic);  Surgeon: Roland Glass MD;  Location: [facility];  Service: Pain Management;  Laterality: Bilateral;    INJECTION OF ANESTHETIC AGENT AROUND MEDIAL BRANCH NERVES INNERVATING CERVICAL FACET JOINT Left 8/22/2024    Procedure: Diagnostic Left C3-5 MBB #1;  Surgeon: Roland Glass MD;  Location: [facility];  Service: Pain Management;  Laterality: Left;    INJECTION OF ANESTHETIC AGENT AROUND MEDIAL BRANCH NERVES INNERVATING CERVICAL FACET JOINT Left 9/12/2024    Procedure: Diagnostic Left C3-5 MBB #2;  Surgeon: Roland Glass MD;  Location: [facility];  Service: Pain Management;  Laterality: Left;    RADIOFREQUENCY THERMOCOAGULATION Left 7/25/2023    Procedure: Left C5-7 RFA with RN IV sedation;  Surgeon: Roland Glass MD;  Location: [facility];  Service: Pain Management;  Laterality: Left;    RADIOFREQUENCY THERMOCOAGULATION Right 8/22/2023    Procedure: Right C5-7 RFA with RN IV sedation;  Surgeon: Roland Glass  "MD MAGDY;  Location: [facility];  Service: Pain Management;  Laterality: Right;    RADIOFREQUENCY THERMOCOAGULATION Right 6/18/2024    Procedure: right C5-7 RFA with RN IV sedation;  Surgeon: Roland Glass MD;  Location: [facility];  Service: Pain Management;  Laterality: Right;    RADIOFREQUENCY THERMOCOAGULATION Left 9/26/2024    Procedure: Left C3-5 RFA;  Surgeon: Roland Glass MD;  Location: [facility];  Service: Pain Management;  Laterality: Left;    RADIOFREQUENCY THERMOCOAGULATION Bilateral 2/18/2025    Procedure: Right C3-5 RFA;  Surgeon: Roland Glass MD;  Location: [facility];  Service: Pain Management;  Laterality: Bilateral;     Review of patient's allergies indicates:   Allergen Reactions    Penicillins         Medications Ordered Prior to Encounter[1]     PMHx, PSHx, Allergies, Medications reviewed in epic    ROS negative except pain complaints in HPI    OBJECTIVE:    /80 (BP Location: Right arm, Patient Position: Sitting)   Pulse 90   Temp 97.3 °F (36.3 °C) (Temporal)   Resp 16   Ht 5' 5" (1.651 m)   Wt 55.7 kg (122 lb 12.7 oz)   SpO2 99%   Breastfeeding No   BMI 20.43 kg/m²     PHYSICAL EXAMINATION:    GENERAL: Well appearing, in no acute distress, alert and oriented x3.  PSYCH:  Mood and affect appropriate.  SKIN: Skin color, texture, turgor normal, no rashes or lesions which will impact the procedure.  CV: RRR with palpation of the radial artery.  PULM: No evidence of respiratory difficulty, symmetric chest rise. Clear to auscultation.  NEURO: Cranial nerves grossly intact.    Plan:    Proceed with procedure as planned Procedure(s) (LRB):  Bilateral C5-7 RFA (Bilateral)    Roland Glass MD  03/25/2025                 [1]   No current facility-administered medications on file prior to encounter.     Current Outpatient Medications on File Prior to Encounter   Medication Sig Dispense Refill    chlorzoxazone (PARAFON FORTE) 500 mg Tab Take 1 tablet (500 mg " total) by mouth 3 (three) times daily as needed (muscle spasms). May cause drowsiness. 90 tablet 1    ibuprofen (ADVIL,MOTRIN) 800 MG tablet Take 1 tablet (800 mg total) by mouth every 6 (six) hours as needed for Pain. 28 tablet 0    albuterol (PROVENTIL/VENTOLIN HFA) 90 mcg/actuation inhaler Inhale 1-2 puffs into the lungs every 6 (six) hours as needed for Wheezing. 8 g 0    medroxyPROGESTERone (DEPO-PROVERA) 150 mg/mL injection Inject 150 mg into the muscle every 3 (three) months.      zolpidem (AMBIEN) 5 MG Tab  (Patient not taking: Reported on 3/25/2025)

## 2025-04-23 ENCOUNTER — PATIENT MESSAGE (OUTPATIENT)
Dept: PAIN MEDICINE | Facility: CLINIC | Age: 25
End: 2025-04-23

## 2025-04-23 ENCOUNTER — TELEPHONE (OUTPATIENT)
Dept: PAIN MEDICINE | Facility: CLINIC | Age: 25
End: 2025-04-23
Payer: MEDICAID

## 2025-04-23 NOTE — TELEPHONE ENCOUNTER
----- Message from Leti sent at 4/23/2025  8:08 AM CDT -----  .Type:  Reschedule Appointment RequestName of Caller:.Marilee Dumas When is the first available appointment?todaySymptoms:Best Call Back Number:.[phone] Additional Information: Patient would like to reschedule the appointment please. Thx. EL
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
## 2025-04-30 ENCOUNTER — OFFICE VISIT (OUTPATIENT)
Dept: PAIN MEDICINE | Facility: CLINIC | Age: 25
End: 2025-04-30
Payer: MEDICAID

## 2025-04-30 VITALS — BODY MASS INDEX: 20.43 KG/M2 | HEIGHT: 65 IN

## 2025-04-30 DIAGNOSIS — M47.812 CERVICAL SPONDYLOSIS: ICD-10-CM

## 2025-04-30 DIAGNOSIS — M79.12 MYALGIA OF AUXILIARY MUSCLES, HEAD AND NECK: ICD-10-CM

## 2025-04-30 DIAGNOSIS — M79.7 FIBROMYALGIA: ICD-10-CM

## 2025-04-30 DIAGNOSIS — M54.2 CERVICAL MUSCLE PAIN: ICD-10-CM

## 2025-04-30 DIAGNOSIS — G44.86 CERVICOGENIC HEADACHE: Primary | ICD-10-CM

## 2025-04-30 PROCEDURE — 1159F MED LIST DOCD IN RCRD: CPT | Mod: CPTII,95,, | Performed by: PHYSICIAN ASSISTANT

## 2025-04-30 PROCEDURE — 98006 SYNCH AUDIO-VIDEO EST MOD 30: CPT | Mod: 95,,, | Performed by: PHYSICIAN ASSISTANT

## 2025-04-30 PROCEDURE — 1160F RVW MEDS BY RX/DR IN RCRD: CPT | Mod: CPTII,95,, | Performed by: PHYSICIAN ASSISTANT

## 2025-05-06 ENCOUNTER — HOSPITAL ENCOUNTER (OUTPATIENT)
Dept: RADIOLOGY | Facility: HOSPITAL | Age: 25
Discharge: HOME | End: 2025-05-06
Attending: NURSE PRACTITIONER
Payer: MEDICAID

## 2025-05-06 DIAGNOSIS — N94.9 DISEASE OF FEMALE GENITAL ORGANS: Primary | ICD-10-CM

## 2025-05-06 DIAGNOSIS — N94.9 DISEASE OF FEMALE GENITAL ORGANS: ICD-10-CM

## 2025-05-06 PROCEDURE — 76856 US EXAM PELVIC COMPLETE: CPT | Mod: 26,,, | Performed by: RADIOLOGY

## 2025-05-06 PROCEDURE — 76830 TRANSVAGINAL US NON-OB: CPT | Mod: 26,,, | Performed by: RADIOLOGY

## 2025-05-06 PROCEDURE — 76856 US EXAM PELVIC COMPLETE: CPT | Mod: TC,PO

## 2025-07-11 ENCOUNTER — PATIENT MESSAGE (OUTPATIENT)
Dept: PAIN MEDICINE | Facility: CLINIC | Age: 25
End: 2025-07-11
Payer: MEDICAID

## 2025-07-16 ENCOUNTER — OFFICE VISIT (OUTPATIENT)
Dept: PAIN MEDICINE | Facility: CLINIC | Age: 25
End: 2025-07-16
Payer: MEDICAID

## 2025-07-16 VITALS
WEIGHT: 126.31 LBS | DIASTOLIC BLOOD PRESSURE: 86 MMHG | HEIGHT: 65 IN | HEART RATE: 103 BPM | SYSTOLIC BLOOD PRESSURE: 119 MMHG | BODY MASS INDEX: 21.04 KG/M2

## 2025-07-16 DIAGNOSIS — G89.4 CHRONIC PAIN SYNDROME: ICD-10-CM

## 2025-07-16 DIAGNOSIS — M79.7 FIBROMYALGIA: ICD-10-CM

## 2025-07-16 DIAGNOSIS — M79.12 MYALGIA OF AUXILIARY MUSCLES, HEAD AND NECK: Primary | ICD-10-CM

## 2025-07-16 DIAGNOSIS — M47.812 CERVICAL SPONDYLOSIS: ICD-10-CM

## 2025-07-16 PROCEDURE — 20553 NJX 1/MLT TRIGGER POINTS 3/>: CPT | Mod: S$PBB,,, | Performed by: PHYSICIAN ASSISTANT

## 2025-07-16 PROCEDURE — 3079F DIAST BP 80-89 MM HG: CPT | Mod: CPTII,,, | Performed by: PHYSICIAN ASSISTANT

## 2025-07-16 PROCEDURE — 3074F SYST BP LT 130 MM HG: CPT | Mod: CPTII,,, | Performed by: PHYSICIAN ASSISTANT

## 2025-07-16 PROCEDURE — 99999 PR PBB SHADOW E&M-EST. PATIENT-LVL III: CPT | Mod: PBBFAC,,, | Performed by: PHYSICIAN ASSISTANT

## 2025-07-16 PROCEDURE — 3008F BODY MASS INDEX DOCD: CPT | Mod: CPTII,,, | Performed by: PHYSICIAN ASSISTANT

## 2025-07-16 PROCEDURE — 99213 OFFICE O/P EST LOW 20 MIN: CPT | Mod: PBBFAC,25 | Performed by: PHYSICIAN ASSISTANT

## 2025-07-16 PROCEDURE — 99999PBSHW PR PBB SHADOW TECHNICAL ONLY FILED TO HB: Mod: JZ,PBBFAC,,

## 2025-07-16 PROCEDURE — 99203 OFFICE O/P NEW LOW 30 MIN: CPT | Mod: 25,S$PBB,, | Performed by: PHYSICIAN ASSISTANT

## 2025-07-16 PROCEDURE — 20553 NJX 1/MLT TRIGGER POINTS 3/>: CPT | Mod: PBBFAC | Performed by: PHYSICIAN ASSISTANT

## 2025-07-16 PROCEDURE — 1159F MED LIST DOCD IN RCRD: CPT | Mod: CPTII,,, | Performed by: PHYSICIAN ASSISTANT

## 2025-07-16 RX ORDER — METHYLPREDNISOLONE ACETATE 40 MG/ML
40 INJECTION, SUSPENSION INTRA-ARTICULAR; INTRALESIONAL; INTRAMUSCULAR; SOFT TISSUE
Status: COMPLETED | OUTPATIENT
Start: 2025-07-16 | End: 2025-07-16

## 2025-07-16 RX ADMIN — METHYLPREDNISOLONE ACETATE 40 MG: 40 INJECTION, SUSPENSION INTRA-ARTICULAR; INTRALESIONAL; INTRAMUSCULAR; SOFT TISSUE at 08:07
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
## 2025-08-15 ENCOUNTER — OFFICE VISIT (OUTPATIENT)
Dept: PAIN MEDICINE | Facility: CLINIC | Age: 25
End: 2025-08-15
Payer: MEDICAID

## 2025-08-15 ENCOUNTER — TELEPHONE (OUTPATIENT)
Dept: PAIN MEDICINE | Facility: CLINIC | Age: 25
End: 2025-08-15
Payer: MEDICAID

## 2025-08-15 VITALS — HEIGHT: 65 IN | BODY MASS INDEX: 21.02 KG/M2

## 2025-08-15 DIAGNOSIS — G44.86 CERVICOGENIC HEADACHE: ICD-10-CM

## 2025-08-15 DIAGNOSIS — M54.2 NECK PAIN: ICD-10-CM

## 2025-08-15 DIAGNOSIS — M54.2 PAIN OF CERVICAL FACET JOINT: ICD-10-CM

## 2025-08-15 DIAGNOSIS — M79.12 MYALGIA OF AUXILIARY MUSCLES, HEAD AND NECK: ICD-10-CM

## 2025-08-15 DIAGNOSIS — M47.812 CERVICAL SPONDYLOSIS: Primary | ICD-10-CM

## 2025-08-25 ENCOUNTER — TELEPHONE (OUTPATIENT)
Dept: PAIN MEDICINE | Facility: CLINIC | Age: 25
End: 2025-08-25
Payer: MEDICAID